# Patient Record
Sex: FEMALE | Race: WHITE | Employment: FULL TIME | URBAN - METROPOLITAN AREA
[De-identification: names, ages, dates, MRNs, and addresses within clinical notes are randomized per-mention and may not be internally consistent; named-entity substitution may affect disease eponyms.]

---

## 2019-01-01 ENCOUNTER — TRANSFERRED RECORDS (OUTPATIENT)
Dept: HEALTH INFORMATION MANAGEMENT | Facility: CLINIC | Age: 24
End: 2019-01-01

## 2019-01-01 LAB — PAP SMEAR - HIM PATIENT REPORTED: NEGATIVE

## 2019-04-05 ENCOUNTER — TRANSFERRED RECORDS (OUTPATIENT)
Dept: HEALTH INFORMATION MANAGEMENT | Facility: CLINIC | Age: 24
End: 2019-04-05

## 2019-04-05 LAB
ALBUMIN (URINE) MG/SPEC: 5.3 MG/L
ALBUMIN/CREATININE RATIO: 2.8 MG/G CREAT
CREATININE (URINE): 1.86 G/L
TSH SERPL-ACNC: 0.84 UIU/ML (ref 0.35–4.94)

## 2019-11-11 ENCOUNTER — TRANSFERRED RECORDS (OUTPATIENT)
Dept: HEALTH INFORMATION MANAGEMENT | Facility: CLINIC | Age: 24
End: 2019-11-11

## 2019-11-11 LAB
RETINOPATHY: NEGATIVE
RETINOPATHY: NORMAL

## 2020-01-23 NOTE — PROGRESS NOTES
Subjective     Isaiah Barnes is a 24 year old female who presents to clinic today for the following health issues:    HPI   Patient is establishing care at this clinic.  Previous health care was at [facility].      Diabetes       Diagnosed with diabetes age 15 years old     Current medications include:  Just insulin  Refills needed of:  No, don't think so   How often are you checking your blood sugar? Four or more times daily  Blood sugar testing frequency justification:  Uncontrolled diabetes  What time of day are you checking your blood sugars (select all that apply)?  Before meals  Have you had any blood sugars above 200?  Yes a few   Have you had any blood sugars below 70?  No    What symptoms do you notice when your blood sugar is low?  Shaky    What concerns do you have today about your diabetes? None     Do you have any of these symptoms? (Select all that apply)  No numbness or tingling in feet.  No redness, sores or blisters on feet.  No complaints of excessive thirst.  No reports of blurry vision.  No significant changes to weight.    Please abstract the following data from this visit with this patient into the appropriate field in Epic:    Tests that can be patient reported without a hard copy:    Pap smear done on this date: 1 year ago (approximately), by this group: in [place], at [facility], results were normal.   LDL 61 mg/dL   [place] [facility] [place] [facility]  TSH 0.84    on 4/5/[place] [facility] [place] [facility]  Microalbumin 2.8   on 4/5/[place] [facility] [place] [facility]    BP Readings from Last 2 Encounters:   01/24/20 128/84   PHQ -2 = 0       How many servings of fruits and vegetables do you eat daily?  2-3    On average, how many sweetened beverages do you drink each day (Examples: soda, juice, sweet tea, etc.  Do NOT count diet or artificially sweetened beverages)?   0    How many days per week do you exercise enough to make your heart  beat faster? 4    How many minutes a day do you exercise enough to make your heart beat faster? 20 - 29    How many days per week do you miss taking your medication? 0    Patient would like to establish care here at our clinic. She was diagnosed with type-1 diabetes at 15-years-old for unknown cause.  There was no family history. She is an otherwise healthy 25-year-old. Her last physical was in April 2019 at [facility]. Her last PAP was 1.5 years ago in [place]. She has no concerns today.    She believes her diabetes is under good control. However, she does not remember the last time she had her A1C checked. She would like a referral for an endocrinologist. She is using novolog throughout the day on sliding scale of 1 unit per 7 carbs and 1 unit for every 40 above 150 blood glucose. Lantus 21units at bedtime has been stable dose for years.  She has noticed that the refillable Novolog pen she was last prescribed seems to require higher dosing.  She would like to switch back to the disposable pen.    She is taking OCP. She is not currently sexually active.  Most recently sexually active with boyfriend - cannot recall last STI screening, but believes it was in [place]. She reports regular periods; LMP 2 weeks ago, typically fairly light and lasts 7 days.     She is a fairly health eater and enjoys running and HIT workouts. She denies smoking and elicit drug use. She drinks 1-2 alcoholic beverages per week. 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  Studied communications at college in [place]. She declines the flu shot today.  Youngest of three children.  Parents live in [place].  Did Spanish Dance as a child.    Santa Schuster, student NP  Reviewed and updated as needed this visit by Provider         Review of Systems   ROS COMP: Constitutional, HEENT, cardiovascular, pulmonary, gi and gu systems are negative, except as otherwise noted.      Objective    /84   Pulse 90   Temp 98.5  F (36.9  C) (Oral)   Ht 1.635 m (5'  "4.37\")   Wt 73.2 kg (161 lb 6.4 oz)   LMP 01/06/2020 (Approximate)   SpO2 97%   BMI 27.39 kg/m    Body mass index is 27.39 kg/m .  Physical Exam   GENERAL: healthy, alert and no distress  EYES: Eyes grossly normal to inspection, PERRL and conjunctivae and sclerae normal  HENT: ear canals and TM's normal, nose and mouth without ulcers or lesions  NECK: no adenopathy, no asymmetry, masses, or scars and thyroid normal to palpation  RESP: lungs clear to auscultation - no rales, rhonchi or wheezes  CV: regular rate and rhythm, normal S1 S2, no S3 or S4, no murmur, click or rub, no peripheral edema and peripheral pulses strong  ABDOMEN: soft, nontender, no hepatosplenomegaly, no masses and bowel sounds normal  MS: no gross musculoskeletal defects noted, no edema  SKIN: no suspicious lesions or rashes  NEURO: Normal strength and tone, mentation intact and speech normal  PSYCH: mentation appears normal, affect normal/bright  Diabetic foot exam: normal DP and PT pulses, no trophic changes or ulcerative lesions, normal sensory exam and normal monofilament exam    Diagnostic Test Results:  Labs reviewed in Epic  pending        Assessment & Plan     (E10.9) Type 1 diabetes mellitus without complication (H)  (primary encounter diagnosis)  Comment:   Plan: ENDOCRINOLOGY ADULT REFERRAL, Hemoglobin A1c,         insulin glargine (LANTUS PEN) 100 UNIT/ML pen,         Comprehensive metabolic panel, insulin aspart         (NOVOLOG PEN) 100 UNIT/ML pen    Diagnosed age 15 years, stable LA dose, novolog refilled with request for disposable pen.  CMP, A1C due - remainder not due until after April 2020        (Z11.8) Special screening examination for chlamydial disease  Comment:   Plan: Chlamydia trachomatis PCR      Solely done to complete [facility] Measurement requirements - no patient concerns or symptoms      (Z30.41) Encounter for surveillance of contraceptive pills  Comment:   Plan: levonorgestrel-ethinyl estradiol      "    (PAULE/TERESSA/LESSINA) 0.1-20 MG-MCG tablet                 Patient Instructions     Please send a [employer] message with the name of your birth control and I can refill whenever it is needed    [employer] on [address]    DTP/aP  08/01/2001  1 of 5      Yes    HPV  07/30/2008  1 of 3  Gardasil 4  Full    No    HPV  10/30/2008  2 of 3  Gardasil 4  Full    No    Influenza  10/30/2008  Booster   Full    No    Influenza  10/31/2011  Booster   Full    No    Influenza  12/17/2012  Booster   Full    No    MenACWY  07/30/2008  1 of 2  Menactra  Full    No    MMR  08/01/2001  1 of 2  MMR II     Yes    Pneumo-poly  02/17/2017  1 of 2  Pneumovax 23  Full    No    Polio  08/01/2001  1 of 4  IPOL     Yes    Td/Tdap  07/24/2007  1 of 3  Tdap  Full    No    Td/Tdap  02/17/2017  2 of 3  Adacel 11+ yrs  Full    No              Return in about 6 months (around 7/24/2020) for Physical Exam.    VELASQUEZ Adorno [facility]

## 2020-01-24 ENCOUNTER — TELEPHONE (OUTPATIENT)
Dept: FAMILY MEDICINE | Facility: CLINIC | Age: 25
End: 2020-01-24

## 2020-01-24 ENCOUNTER — OFFICE VISIT (OUTPATIENT)
Dept: FAMILY MEDICINE | Facility: CLINIC | Age: 25
End: 2020-01-24
Payer: COMMERCIAL

## 2020-01-24 VITALS
WEIGHT: 161.4 LBS | DIASTOLIC BLOOD PRESSURE: 84 MMHG | OXYGEN SATURATION: 97 % | HEART RATE: 90 BPM | BODY MASS INDEX: 27.55 KG/M2 | TEMPERATURE: 98.5 F | SYSTOLIC BLOOD PRESSURE: 128 MMHG | HEIGHT: 64 IN

## 2020-01-24 DIAGNOSIS — Z30.41 ENCOUNTER FOR SURVEILLANCE OF CONTRACEPTIVE PILLS: ICD-10-CM

## 2020-01-24 DIAGNOSIS — Z11.8 SPECIAL SCREENING EXAMINATION FOR CHLAMYDIAL DISEASE: ICD-10-CM

## 2020-01-24 DIAGNOSIS — Z80.0 FAMILY HISTORY OF COLON CANCER: ICD-10-CM

## 2020-01-24 DIAGNOSIS — E10.9 TYPE 1 DIABETES MELLITUS WITHOUT COMPLICATION (H): Primary | ICD-10-CM

## 2020-01-24 LAB
ALBUMIN SERPL-MCNC: 4.1 G/DL (ref 3.4–5)
ALP SERPL-CCNC: 98 U/L (ref 40–150)
ALT SERPL W P-5'-P-CCNC: 20 U/L (ref 0–50)
ANION GAP SERPL CALCULATED.3IONS-SCNC: 6 MMOL/L (ref 3–14)
AST SERPL W P-5'-P-CCNC: 17 U/L (ref 0–45)
BILIRUB SERPL-MCNC: 0.3 MG/DL (ref 0.2–1.3)
BUN SERPL-MCNC: 11 MG/DL (ref 7–30)
CALCIUM SERPL-MCNC: 9.2 MG/DL (ref 8.5–10.1)
CHLORIDE SERPL-SCNC: 105 MMOL/L (ref 94–109)
CO2 SERPL-SCNC: 26 MMOL/L (ref 20–32)
CREAT SERPL-MCNC: 0.77 MG/DL (ref 0.52–1.04)
GFR SERPL CREATININE-BSD FRML MDRD: >90 ML/MIN/{1.73_M2}
GLUCOSE SERPL-MCNC: 155 MG/DL (ref 70–99)
HBA1C MFR BLD: 9.1 % (ref 0–5.6)
POTASSIUM SERPL-SCNC: 4.1 MMOL/L (ref 3.4–5.3)
PROT SERPL-MCNC: 7.5 G/DL (ref 6.8–8.8)
SODIUM SERPL-SCNC: 137 MMOL/L (ref 133–144)

## 2020-01-24 PROCEDURE — 99203 OFFICE O/P NEW LOW 30 MIN: CPT | Performed by: NURSE PRACTITIONER

## 2020-01-24 PROCEDURE — 80053 COMPREHEN METABOLIC PANEL: CPT | Performed by: NURSE PRACTITIONER

## 2020-01-24 PROCEDURE — 83036 HEMOGLOBIN GLYCOSYLATED A1C: CPT | Performed by: NURSE PRACTITIONER

## 2020-01-24 PROCEDURE — 36415 COLL VENOUS BLD VENIPUNCTURE: CPT | Performed by: NURSE PRACTITIONER

## 2020-01-24 PROCEDURE — 87491 CHLMYD TRACH DNA AMP PROBE: CPT | Performed by: NURSE PRACTITIONER

## 2020-01-24 RX ORDER — LEVONORGESTREL/ETHIN.ESTRADIOL 0.1-0.02MG
1 TABLET ORAL DAILY
Qty: 84 TABLET | Refills: 4 | Status: SHIPPED | OUTPATIENT
Start: 2020-01-24 | End: 2020-12-10

## 2020-01-24 ASSESSMENT — MIFFLIN-ST. JEOR: SCORE: 1477.98

## 2020-01-24 NOTE — TELEPHONE ENCOUNTER
Please let patient know that A1C was 9.1 which I believe is higher than her typical.  Can you ask what her typical fasting number has been?  I think we should increase her lantus.  No need to increase the novolog scale if she is able to get the disposable pen, but I don't think that change will get vaishnavi A1C to goal alone.  If she has endo consult within the next two weeks she can wait for that, but we should otherwise make changes. Rest of the labs were normal.  JESSY Cates

## 2020-01-24 NOTE — PATIENT INSTRUCTIONS
Please send a [employer] message with the name of your birth control and I can refill whenever it is needed    T[employer] on [address]    DTP/aP  08/01/2001  1 of 5      Yes    HPV  07/30/2008  1 of 3  Gardasil 4  Full    No    HPV  10/30/2008  2 of 3  Gardasil 4  Full    No    Influenza  10/30/2008  Booster   Full    No    Influenza  10/31/2011  Booster   Full    No    Influenza  12/17/2012  Booster   Full    No    MenACWY  07/30/2008  1 of 2  Menactra  Full    No    MMR  08/01/2001  1 of 2  MMR II     Yes    Pneumo-poly  02/17/2017  1 of 2  Pneumovax 23  Full    No    Polio  08/01/2001  1 of 4  IPOL     Yes    Td/Tdap  07/24/2007  1 of 3  Tdap  Full    No    Td/Tdap  02/17/2017  2 of 3  Adacel 11+ yrs  Full    No

## 2020-01-24 NOTE — TELEPHONE ENCOUNTER
Can you ask patient what age her father was diagnosed with colon cancer and if his doctors made recommendations for his children regarding screening?  Otherwise typically it starts for children 10 years prior too the age of parent's diagnosis.  If she knows the age, can you please send note back to me so I can document?  Thanks!  We didn't get MyChart set up at her appt, but I encourage her to set up MyChart for her convenience.  JESSY Cates

## 2020-01-24 NOTE — TELEPHONE ENCOUNTER
Perlita,    Spoke with patient and she said her father was diagnosed at 47, and recommendations from his provider for his children to all be screened at age 30.     Thanks  Cher Mckeon RN   [facility]

## 2020-01-24 NOTE — Clinical Note
LDL 61 mg/dL   on [facility] EverywhereTSH 0.84    on 4/5/19 [facility] - in Care EverywhereMicroalbumin 2.8   on 4/5/19 [facility] - in Care Everywhere

## 2020-01-24 NOTE — TELEPHONE ENCOUNTER
Called patient. Left [facility] asking for return call to clinic to go over additional questions provider has regarding her family medical history. Chelsi Cruz RN on 1/24/2020 at 9:34 AM

## 2020-01-25 NOTE — RESULT ENCOUNTER NOTE
Isaiah,    Thank you for getting back to the nurses about your father's diagnosis.  I've noted that in your chart.  Your A1C is 9.1.  I am guessing this is higher than you typically see, but I don't have any past records to compare yet.  If you aren't going to see endo in the next two weeks we should think about increasing Lantus dose.  What is your typical AM fasting glucose?  I know we are hoping you will get the disposable novolog pen, but I am skeptical that alone would be enough to get A1C below 8. Let me know your Am glucoses and your thoughts on making changes.  If you have any questions, please feel free to contact the clinic.    JESSY Cates

## 2020-01-26 LAB
C TRACH DNA SPEC QL NAA+PROBE: NEGATIVE
SPECIMEN SOURCE: NORMAL

## 2020-01-29 NOTE — RESULT ENCOUNTER NOTE
Isaiah,    Chlamydia was negative.  I am hoping to hear back from you about which pen was covered so that we can make a plan for sliding scale or LA insulin adjustments.  If you have any questions, please feel free to contact the clinic.    JESSY Cates

## 2020-01-30 NOTE — TELEPHONE ENCOUNTER
RECORDS RECEIVED FROM: internal/CE   DATE RECEIVED: 4/6/20   NOTES (FOR ALL VISITS) STATUS DETAILS   OFFICE NOTES from referring provider Internal 1.24.20 Soo Chris   OFFICE NOTES from other specialist N/A    ED NOTES N/A    OPERATIVE REPORT  (thyroid, pituitary, adrenal, parathyroid) N/A    MEDICATION LIST Internal    IMAGING      DEXASCAN N/A    MRI (BRAIN) N/A    XR (Chest) N/A    CT (HEAD/NECK/CHEST/ABDOMEN) N/A    NUCLEAR  N/A    ULTRASOUND (HEAD/NECK) N/A    LABS     DIABETES: HBGA1C, CREATININE, FASTING LIPIDS, MICROALBUMIN URINE, POTASSIUM, TSH, T4    THYROID: TSH, T4, CBC, THYRODLONULIN, TOTAL T3, FREE T4, CALCITONIN, CEA Internal/CE   HBGA1C 1/24/20  CREATININE 4/5/19  MICROALBUMIN URINE 4/5/19  TSH- 4/5/19  T4- 7/3/12

## 2020-01-30 NOTE — TELEPHONE ENCOUNTER
Left vm for patient to return call to clinic.    Cher Mckeon RN   [facility]

## 2020-01-31 NOTE — TELEPHONE ENCOUNTER
Send [employer] message to patient with providers message and follow-up question. Chelsi Cruz RN on 1/31/2020 at 1:06 PM

## 2020-02-04 NOTE — TELEPHONE ENCOUNTER
Perlita,    Spoke with Isaiah and she said she has changed her diet and has increased her exercise. This has helped her BS, which has been around 115 average. She has a consult with luzmaria in April. She hopes to continue with change in diet and exercise instead of increasing lantus.    Thanks  Cher Mckeon RN   [facility]

## 2020-02-06 NOTE — TELEPHONE ENCOUNTER
That sounds great.  I am thrilled she has been able to get glucoses down with diet and exercise.  JESSY Cates

## 2020-03-19 ENCOUNTER — MYC MEDICAL ADVICE (OUTPATIENT)
Dept: FAMILY MEDICINE | Facility: CLINIC | Age: 25
End: 2020-03-19

## 2020-03-19 DIAGNOSIS — E10.9 TYPE 1 DIABETES MELLITUS WITHOUT COMPLICATION (H): Primary | ICD-10-CM

## 2020-03-19 NOTE — TELEPHONE ENCOUNTER
Test strips filled for patient. My chart message sent to patient to let her know.    Cher Mckeon RN   [facility]

## 2020-04-03 NOTE — PROGRESS NOTES
"Week of__/__/__ Date  3__/_23_  Date  3__/_24_ Date  3__/_25_ Date  _3_/26__ Date  3__/_27_ Date  _3_/28__ Date  3__/_29_    Time BG Time BG Time BG Time BG Time BG Time BG Time BG    6:55AM 87 7:50AM 115 9:00AM 120 7:00AM 64 7:30AM 79 6:00AM 126 11:00AM 133    12:30PM 160 2:00PM 132 5:00PM 77 9:45AM 205 9:00AM 256   7:50PM 152    4:00PM 140 10:00PM 68 9:00PM 126 8:23PM 143 11:20AM 106        11:00PM 130       9:00PM 117         Week of__/__/__ Date  3__/_30_  Date  3__/_31_ Date  _4_/1__ Date  4__/2__ Date  4__/_3_ Date  __/__ Date  __/__    Time BG Time BG Time BG Time BG Time BG Time BG Time BG    7:50AM 195 7:39AM 108 7:30AM 88 7:30AM 140 9:30AM 176        12:20PM 158 5:30PM 125 3:50PM 170            8:30PM 73 10:28PM 203 10:00PM 214            10:00PM 125                 Isaiah Barnes is a 24 year old female who is being evaluated via a billable telephone visit.      The patient has been notified of following:     \"This telephone visit will be conducted via a call between you and your physician/provider. We have found that certain health care needs can be provided without the need for a physical exam.  This service lets us provide the care you need with a short phone conversation.  If a prescription is necessary we can send it directly to your pharmacy.  If lab work is needed we can place an order for that and you can then stop by our lab to have the test done at a later time.    If during the course of the call the physician/provider feels a telephone visit is not appropriate, you will not be charged for this service.\"     Patient has given verbal consent for Telephone visit?  Yes    Isaiah Barnes complains of    Chief Complaint   Patient presents with     New Patient     TYPE 1     This 24-year-old woman was evaluated via a phone visit.  She would like to establish care for her type 1 diabetes in our clinic.  She reports she was diagnosed in 2010.  She has managed her diabetes with injections this " entire time.  She currently takes Lantus 21 units each night at 10 PM.  She also takes NovoLog 1 unit for 7 g and 1 unit to drop her 40 points with a target of 110 at mealtimes.  She checks her blood sugars about 4 times a day.  The blood sugars are listed above.  She recognizes her hypoglycemia when her values are in the 60s.  She is never had a severe episode of hypoglycemia.  Her last A1c was measured in January.  She was very surprised it was 9.1%.  She has had A1c's into the high sixes in recent years while in college.  She thinks the rise was because of the change in her lifestyle.  Since seeing that 9.1%, she started checking more often and being more vigilant in managing her sugars.    She eats breakfast, lunch, and dinner each day.  She is currently living with her parents so she is cooking some of her meals and her family is coping with others. She is confident with carb counting.  She likes to exercise by kickboxing but has not been able to do so because of the covid epidemic.  She was usually managed this activity by eating a low-carb protein bar before the activity.  This kept her blood sugars in target throughout the activity.  She saw her eye doctor last fall and was told she has no retinopathy.  She has no paresthesias of her feet.  She is never been told she had any kidney disease.    She did wear a continuous glucose monitor several years ago when it first came out.  She thought it was impractical to wear a device that required so much blood sugar checking.  She be interested in learning about new devices.  Her past medical history is otherwise unremarkable.    Her only medications are the insulin noted above.  She is also on birth control pills.     ROS: 10 point ROS neg other than the symptoms noted above in the HPI.    Social history is significant for working at [employer] as an .  She does not smoke cigarettes.    Family history is unremarkable.  No one else has  diabetes.        Rolando has no known allergies.    Recent Labs   Lab Test 01/24/20  0852 04/05/19 07/03/12  1545   A1C 9.1*  --   --    TSH  --  0.84 0.54   T4  --   --  1.09  6.7   CR 0.77  --  0.80     Urine alb 2.8 mg/gm cr in 2019    Assessment and plan:    1.  Diabetes control.  Her most recent A1c was above target, but her current blood sugars look much better controlled.  We will plan to measure an A1c test when I can see her next.  Because she is taking Lantus, I suggested she consider taking Tresiba or Toujeo.  I explained how they did not need to be taken at the same time each day and may provide more consistent basal coverage.  She is interested in doing this.  She thinks she has about 2 months less of Lantus and will make a switch then.  I also suggested you think about wearing the continuous glucose monitor because the data suggest that improves glucose control and particularly palpitations avoid hypoglycemia.  She I will send her the names of some of the sensor companies as well we will schedule her to see the nurse educator is interested in learning more about this, particularly because she no longer has to check her blood sugars if she wears the sensor.  About plan.  I also mentioned she might want to use a pump and sensor combination.  I will give her the names of some of these devices.  She will let me know if she wants to do that.  For now we will make no changes in NovoLog doses.  When she switches to Tresiba asked her to take 19 units each day.  I warned her that she might have high sugars the first day after making the switch and that she should not change the dose more frequently than every 3 or 4 days because of its long half-life.    2.  Diabetes complications.  By history she does not have any complications.  I will plan to check her urine albumin when she comes in.  We will check to see  Lisinopril.    3.CVD risk.  Her recent blood pressure was in target.  We will plan to check her  lipids at some point in the future.    Follow-up 3 months with 1 of the physicians assistants in 6 months with me.          Phone call duration: 32 minutes    Lola Butelr MD

## 2020-04-06 ENCOUNTER — PRE VISIT (OUTPATIENT)
Dept: ENDOCRINOLOGY | Facility: CLINIC | Age: 25
End: 2020-04-06

## 2020-04-06 ENCOUNTER — VIRTUAL VISIT (OUTPATIENT)
Dept: ENDOCRINOLOGY | Facility: CLINIC | Age: 25
End: 2020-04-06
Attending: NURSE PRACTITIONER
Payer: COMMERCIAL

## 2020-04-06 DIAGNOSIS — E10.9 TYPE 1 DIABETES MELLITUS WITHOUT COMPLICATION (H): Primary | ICD-10-CM

## 2020-04-06 NOTE — LETTER
"4/6/2020       RE: Isaiah Barnes  [address] [place] [address]     Dear Colleague,    Thank you for referring your patient, Isaiah Barnes, to the [facility] ENDOCRINOLOGY at [facility]. Please see a copy of my visit note below.    Week of__/__/__ Date  3__/_23_  Date  3__/_24_ Date  3__/_25_ Date  _3_/26__ Date  3__/_27_ Date  _3_/28__ Date  3__/_29_    Time BG Time BG Time BG Time BG Time BG Time BG Time BG    6:55AM 87 7:50AM 115 9:00AM 120 7:00AM 64 7:30AM 79 6:00AM 126 11:00AM 133    12:30PM 160 2:00PM 132 5:00PM 77 9:45AM 205 9:00AM 256   7:50PM 152    4:00PM 140 10:00PM 68 9:00PM 126 8:23PM 143 11:20AM 106        11:00PM 130       9:00PM 117         Week of__/__/__ Date  3__/_30_  Date  3__/_31_ Date  _4_/1__ Date  4__/2__ Date  4__/_3_ Date  __/__ Date  __/__    Time BG Time BG Time BG Time BG Time BG Time BG Time BG    7:50AM 195 7:39AM 108 7:30AM 88 7:30AM 140 9:30AM 176        12:20PM 158 5:30PM 125 3:50PM 170            8:30PM 73 10:28PM 203 10:00PM 214            10:00PM 125                 Isaiah Barnes is a 24 year old female who is being evaluated via a billable telephone visit.      The patient has been notified of following:     \"This telephone visit will be conducted via a call between you and your physician/provider. We have found that certain health care needs can be provided without the need for a physical exam.  This service lets us provide the care you need with a short phone conversation.  If a prescription is necessary we can send it directly to your pharmacy.  If lab work is needed we can place an order for that and you can then stop by our lab to have the test done at a later time.    If during the course of the call the physician/provider feels a telephone visit is not appropriate, you will not be charged for this service.\"     Patient has given verbal consent for Telephone visit?  Yes    Isaiah Barnes complains of    Chief " Complaint   Patient presents with     New Patient     TYPE 1     This 24-year-old woman was evaluated via a phone visit.  She would like to establish care for her type 1 diabetes in our clinic.  She reports she was diagnosed in 2010.  She has managed her diabetes with injections this entire time.  She currently takes Lantus 21 units each night at 10 PM.  She also takes NovoLog 1 unit for 7 g and 1 unit to drop her 40 points with a target of 110 at mealtimes.  She checks her blood sugars about 4 times a day.  The blood sugars are listed above.  She recognizes her hypoglycemia when her values are in the 60s.  She is never had a severe episode of hypoglycemia.  Her last A1c was measured in January.  She was very surprised it was 9.1%.  She has had A1c's into the high sixes in recent years while in college.  She thinks the rise was because of the change in her lifestyle.  Since seeing that 9.1%, she started checking more often and being more vigilant in managing her sugars.    She eats breakfast, lunch, and dinner each day.  She is currently living with her parents so she is cooking some of her meals and her family is coping with others. She is confident with carb counting.  She likes to exercise by kickboxing but has not been able to do so because of the covid epidemic.  She was usually managed this activity by eating a low-carb protein bar before the activity.  This kept her blood sugars in target throughout the activity.  She saw her eye doctor last fall and was told she has no retinopathy.  She has no paresthesias of her feet.  She is never been told she had any kidney disease.    She did wear a continuous glucose monitor several years ago when it first came out.  She thought it was impractical to wear a device that required so much blood sugar checking.  She be interested in learning about new devices.  Her past medical history is otherwise unremarkable.    Her only medications are the insulin noted above.  She is  also on birth control pills.     ROS: 10 point ROS neg other than the symptoms noted above in the HPI.    Social history is significant for working at [employer] as an .  She does not smoke cigarettes.    Family history is unremarkable.  No one else has diabetes.        IPatient has no known allergies.    Recent Labs   Lab Test 01/24/20  0852 04/05/19 07/03/12  1545   A1C 9.1*  --   --    TSH  --  0.84 0.54   T4  --   --  1.09  6.7   CR 0.77  --  0.80     Urine alb 2.8 mg/gm cr in 2019    Assessment and plan:    1.  Diabetes control.  Her most recent A1c was above target, but her current blood sugars look much better controlled.  We will plan to measure an A1c test when I can see her next.  Because she is taking Lantus, I suggested she consider taking Tresiba or Toujeo.  I explained how they did not need to be taken at the same time each day and may provide more consistent basal coverage.  She is interested in doing this.  She thinks she has about 2 months less of Lantus and will make a switch then.  I also suggested you think about wearing the continuous glucose monitor because the data suggest that improves glucose control and particularly palpitations avoid hypoglycemia.  She I will send her the names of some of the sensor companies as well we will schedule her to see the nurse educator is interested in learning more about this, particularly because she no longer has to check her blood sugars if she wears the sensor.  About plan.  I also mentioned she might want to use a pump and sensor combination.  I will give her the names of some of these devices.  She will let me know if she wants to do that.  For now we will make no changes in NovoLog doses.  When she switches to Tresiba asked her to take 19 units each day.  I warned her that she might have high sugars the first day after making the switch and that she should not change the dose more frequently than every 3 or 4 days because of its long  half-life.    2.  Diabetes complications.  By history she does not have any complications.  I will plan to check her urine albumin when she comes in.  We will check to see  Lisinopril.    3.CVD risk.  Her recent blood pressure was in target.  We will plan to check her lipids at some point in the future.    Follow-up 3 months with 1 of the physicians assistants in 6 months with me.          Phone call duration: 32 minutes    Lola Butler MD

## 2020-04-06 NOTE — PATIENT INSTRUCTIONS
Nice to talk with you today!  Here is a summary of the plans we made.    1.  When you run out of lantus, switch to tresiba 19 units each day.  I only order one pen in case you don't like it. If you do, let me know and I will order a 3 month supply.     2.  Check out the dexcom sensor, tandem pump, medtronic pump and sensor, omnipod sensor on line.    3.  If you want to try one of these devices, please see the nurse educator to learn about them.  I will put an order in for you to see her.    4.  When convenient, have lab tests done.  I will put orders in.  You can have them at any [facility] lab.

## 2020-12-01 NOTE — TELEPHONE ENCOUNTER
RECORDS RECEIVED FROM: internal    DATE RECEIVED: 12.15.20    NOTES (FOR ALL VISITS) STATUS DETAILS   OFFICE NOTES from referring provider Internal  Soo Chris   OFFICE NOTES from other specialist Internal /CE Zhane 12.1.19  Jayro   Internal- 4.6.20 [facility]    ED NOTES na    OPERATIVE REPORT  (thyroid, pituitary, adrenal, parathyroid) na    MEDICATION LIST Internal     IMAGING      DEXASCAN na    MRI (BRAIN) na    XR (Chest) na    CT (HEAD/NECK/CHEST/ABDOMEN) na    NUCLEAR  na    ULTRASOUND (HEAD/NECK) na    LABS     DIABETES: HBGA1C, CREATININE, FASTING LIPIDS, MICROALBUMIN URINE, POTASSIUM, TSH, T4    THYROID: TSH, T4, CBC, THYRODLONULIN, TOTAL T3, FREE T4, CALCITONIN, CEA Internal  HBGA1C 1.24.20   CREATININE 4.5.19   Cbc- 7.3.12  T3- 2012  TSH- 4.5.19  T4- 2012

## 2020-12-10 ENCOUNTER — OFFICE VISIT (OUTPATIENT)
Dept: FAMILY MEDICINE | Facility: CLINIC | Age: 25
End: 2020-12-10
Payer: COMMERCIAL

## 2020-12-10 VITALS
SYSTOLIC BLOOD PRESSURE: 118 MMHG | HEART RATE: 90 BPM | TEMPERATURE: 98.9 F | WEIGHT: 163 LBS | DIASTOLIC BLOOD PRESSURE: 84 MMHG | RESPIRATION RATE: 16 BRPM | HEIGHT: 64 IN | OXYGEN SATURATION: 97 % | BODY MASS INDEX: 27.83 KG/M2

## 2020-12-10 DIAGNOSIS — Z30.41 ENCOUNTER FOR SURVEILLANCE OF CONTRACEPTIVE PILLS: ICD-10-CM

## 2020-12-10 DIAGNOSIS — E10.9 TYPE 1 DIABETES MELLITUS WITHOUT COMPLICATION (H): ICD-10-CM

## 2020-12-10 DIAGNOSIS — Z11.59 ENCOUNTER FOR HEPATITIS C SCREENING TEST FOR LOW RISK PATIENT: ICD-10-CM

## 2020-12-10 DIAGNOSIS — E10.65 TYPE 1 DIABETES MELLITUS WITH HYPERGLYCEMIA (H): Primary | ICD-10-CM

## 2020-12-10 DIAGNOSIS — Z11.8 SPECIAL SCREENING EXAMINATION FOR CHLAMYDIAL DISEASE: ICD-10-CM

## 2020-12-10 DIAGNOSIS — Z23 NEED FOR PROPHYLACTIC VACCINATION AND INOCULATION AGAINST INFLUENZA: ICD-10-CM

## 2020-12-10 LAB
ALBUMIN SERPL-MCNC: 3.9 G/DL (ref 3.4–5)
ALP SERPL-CCNC: 93 U/L (ref 40–150)
ALT SERPL W P-5'-P-CCNC: 16 U/L (ref 0–50)
ANION GAP SERPL CALCULATED.3IONS-SCNC: 6 MMOL/L (ref 3–14)
AST SERPL W P-5'-P-CCNC: 11 U/L (ref 0–45)
BILIRUB SERPL-MCNC: 0.3 MG/DL (ref 0.2–1.3)
BUN SERPL-MCNC: 10 MG/DL (ref 7–30)
CALCIUM SERPL-MCNC: 9.2 MG/DL (ref 8.5–10.1)
CHLORIDE SERPL-SCNC: 105 MMOL/L (ref 94–109)
CHOLEST SERPL-MCNC: 155 MG/DL
CO2 SERPL-SCNC: 25 MMOL/L (ref 20–32)
CREAT SERPL-MCNC: 0.78 MG/DL (ref 0.52–1.04)
CREAT UR-MCNC: 120 MG/DL
GFR SERPL CREATININE-BSD FRML MDRD: >90 ML/MIN/{1.73_M2}
GLUCOSE SERPL-MCNC: 210 MG/DL (ref 70–99)
HBA1C MFR BLD: 8 % (ref 0–5.6)
HDLC SERPL-MCNC: 58 MG/DL
LDLC SERPL CALC-MCNC: 79 MG/DL
MICROALBUMIN UR-MCNC: 6 MG/L
MICROALBUMIN/CREAT UR: 5.02 MG/G CR (ref 0–25)
NONHDLC SERPL-MCNC: 97 MG/DL
POTASSIUM SERPL-SCNC: 4 MMOL/L (ref 3.4–5.3)
PROT SERPL-MCNC: 7.4 G/DL (ref 6.8–8.8)
SODIUM SERPL-SCNC: 136 MMOL/L (ref 133–144)
TRIGL SERPL-MCNC: 90 MG/DL
TSH SERPL DL<=0.005 MIU/L-ACNC: 0.68 MU/L (ref 0.4–4)

## 2020-12-10 PROCEDURE — 84443 ASSAY THYROID STIM HORMONE: CPT | Performed by: NURSE PRACTITIONER

## 2020-12-10 PROCEDURE — 36415 COLL VENOUS BLD VENIPUNCTURE: CPT | Performed by: INTERNAL MEDICINE

## 2020-12-10 PROCEDURE — 80053 COMPREHEN METABOLIC PANEL: CPT | Performed by: NURSE PRACTITIONER

## 2020-12-10 PROCEDURE — 90686 IIV4 VACC NO PRSV 0.5 ML IM: CPT | Performed by: NURSE PRACTITIONER

## 2020-12-10 PROCEDURE — 82043 UR ALBUMIN QUANTITATIVE: CPT | Performed by: INTERNAL MEDICINE

## 2020-12-10 PROCEDURE — 90471 IMMUNIZATION ADMIN: CPT | Performed by: NURSE PRACTITIONER

## 2020-12-10 PROCEDURE — 83036 HEMOGLOBIN GLYCOSYLATED A1C: CPT | Performed by: INTERNAL MEDICINE

## 2020-12-10 PROCEDURE — 99214 OFFICE O/P EST MOD 30 MIN: CPT | Mod: 25 | Performed by: NURSE PRACTITIONER

## 2020-12-10 PROCEDURE — 80061 LIPID PANEL: CPT | Performed by: INTERNAL MEDICINE

## 2020-12-10 PROCEDURE — 87491 CHLMYD TRACH DNA AMP PROBE: CPT | Performed by: NURSE PRACTITIONER

## 2020-12-10 PROCEDURE — 86803 HEPATITIS C AB TEST: CPT | Performed by: NURSE PRACTITIONER

## 2020-12-10 RX ORDER — LEVONORGESTREL/ETHIN.ESTRADIOL 0.1-0.02MG
1 TABLET ORAL DAILY
Qty: 84 TABLET | Refills: 4 | Status: SHIPPED | OUTPATIENT
Start: 2020-12-10 | End: 2021-12-28

## 2020-12-10 ASSESSMENT — MIFFLIN-ST. JEOR: SCORE: 1480.23

## 2020-12-10 NOTE — PATIENT INSTRUCTIONS
"[facility] - [phone]  Tatyana Brown or Cielo Valladares at [place];   Chapin Duarte, Sj Benjamin or Mely Kidd at [place];   Kurt Pavon - [place]      Outside of [place] - recommended by patients who have had good experiences    [facility] for Health and Wellness  Noemy Sandhu (?taking new patients?)  [phone]    [facility] in [place] - birth trauma, body work  [phone]    Yumi Vázquez - [place] and [place] on [address]  [phone]    Valarie Navarro  [phone]  [address]   [place], [place], [address]    Delmer Rivera - \"sex-positive\"  [address]  [address]  [place], [place] [address]     Daniel Valdes - LGBT experience  [place] for [facility] on [facility] in [place]  [address]  [place] [address]  Telephone: [phone]    Angela Michel  [address]  [place] [address]  Phone: [phone] X [phone]  [email or web address] [place]    Delta Larkey - family systems   [email or web address]  [address]  [place] [address]  Tel: [phone]      "

## 2020-12-11 LAB
C TRACH DNA SPEC QL NAA+PROBE: NEGATIVE
HCV AB SERPL QL IA: NONREACTIVE
SPECIMEN SOURCE: NORMAL

## 2020-12-14 ENCOUNTER — HEALTH MAINTENANCE LETTER (OUTPATIENT)
Age: 25
End: 2020-12-14

## 2020-12-15 ENCOUNTER — PRE VISIT (OUTPATIENT)
Dept: ENDOCRINOLOGY | Facility: CLINIC | Age: 25
End: 2020-12-15

## 2020-12-18 NOTE — PATIENT INSTRUCTIONS
Increase your tresiba to 20 units each day.  If your fasting sugars all remain above 100 two weeks after making this change, OK to increase the dose to 21 units.    Check your blood sugar 2 hours after you give insulin for a meal and/or correction.  It should be less than 150.  If it isn't the carb ratio or correction factor may need to be changed.  Happy to help with that via NewAer if you wish.    The other long acting insulin we discussed as an alternative to tresiba is toujeo.    Check out the perla sensor on line and see if it looks small enough for you to tolerate.  Let me know if you want me to write a prescription.    We appreciate your assistance in coordinating your healthcare.     Please upload your insulin pump, blood sugar meter and/or continuous glucose monitor at home 1-2 days before your next diabetes-related appointment.   This will allow your provider to review your  data before your scheduled virtual visit.    To ask a question to your Endocrine care team, please send them a NewAer message, or reach them by phone at [phone]     To expedite your medication refill(s), please contact your pharmacy and have them   fax a refill request to: [phone].  *Please allow 3 business days for routine medication refills.  *Please allow 5 business days for controlled substance medication refills.    For after-hours urgent Endocrine issues, that do not require 911, please dial [phone], and ask to speak with the Endocrinologist On-Call

## 2020-12-18 NOTE — PROGRESS NOTES
"Outcome for 12/18/20 9:57 AM :Left Voicemail for patient to call back- soj   Outcome for 12/21/20 12:17 PM :Left Voicemail for patient to call back-soj  Outcome for 12/22/20 9:06 AM :Left Voicemail for patient to call back and sent Nifty After Fiftyhart message to return call to clinic-so   Isaiah Barnes is a 25 year old female who is being evaluated via a billable video visit.      The patient has been notified of following:     \"This video visit will be conducted via a call between you and your physician/provider. We have found that certain health care needs can be provided without the need for an in-person physical exam.  This service lets us provide the care you need with a video conversation.  If a prescription is necessary we can send it directly to your pharmacy.  If lab work is needed we can place an order for that and you can then stop by our lab to have the test done at a later time.    Video visits are billed at different rates depending on your insurance coverage.  Please reach out to your insurance provider with any questions.    If during the course of the call the physician/provider feels a video visit is not appropriate, you will not be charged for this service.\"    Patient has given verbal consent for Video visit? Yes  How would you like to obtain your AVS? MyChart  If you are dropped from the video visit, the video invite should be resent to: Send to e-mail at: [email or web address]  Will anyone else be joining your video visit? No        Video-Visit Details    Type of service:  Video Visit    Video Start Time: 1:50  Video End Time: 2:16 PM    Originating Location (pt. Location): Home    Distant Location (provider location):  [facility] ENDOCRINOLOG[facility] [place]     Platform used for Video Visit: Abbey Butler MD       This 25-year-old woman seen for f/u of her type 1 diabetes.  I first saw her about 6 mo ago.  .  She reports she was diagnosed in 2010.  She has managed her " diabetes with injections this entire time.  She started taking tresiba about a month ago after she ran out of her lantus.  Overall she thinks it is better.  She really likes the fact that she doesn't have to take it the same time of day each day.  Her sugars are listed below.      Date Time Blood sugar   14-Dec 11:30pm 173   14-Dec 5:21pm 119   14-Dec 12:30pm 131   14-Dec 9:15am 115   13-Dec 10:22pm 70   13-Dec 6:45pm 190   13-Dec 1:30pm 114   13-Dec 10:00am 121   12-Dec 10:30pm 136   12-Dec 7:00pm 101   12-Dec 1:00pm 157   11-Dec 10:15pm 166   11-Dec 6:30pm 149   11-Dec 9:45am 123   10-Dec 10:30pm 175   10-Dec 6:45pm 140   10-Dec 8:45am 129   9-Dec 9:55pm 134   9-Dec 4:50pm 150   9-Dec 1:00pm 161   9-Dec 8:30am 190   8-Dec 7:00pm 230   8-Dec 12:30pm 250   8-Dec 8:30am 267   7-Dec 11:00pm 302   7-Dec 7:15pm 175   7-Dec 10:00am 163   6-Dec 10:00pm 170   6-Dec 3:15pm 183   6-Dec 10:30am 137   6-Dec 8:15am 145   5-Dec 9:45pm 153   5-Dec 5:00pm 136   5-Dec 11:30am 124   4-Dec 11:00pm 155   4-Dec 7:15pm 126   4-Dec 1:30pm 108   4-Dec 9:00am 154   3-Dec 11:00pm 177   3-Dec 5:54pm 165   3-Dec 11:45pm 140   2-Dec 11:00pm 170   2-Dec 12:15pm 139   1-Dec 10:30pm 254   1-Dec 2:30pm 113   1-Dec 9:45am 142     She takes 19 units of tresiba daily and   NovoLog 1 unit for 7 g and 1 unit to drop her 40 points with a target of 110 at mealtimes.   She recognizes her hypoglycemia when her values are in the 60s. She was drinking more wine earlier in the year and did get some lows on those nights.  She isn't doing that anymore and no longer has lows. She does have glucagon at home.  She is never had a severe episode of hypoglycemia.      She eats breakfast, lunch, and dinner each day.  She is now living with a cousin in an apartment.  She has had some anxiety and her PCP recommended she get more exercise.  She is now running some and doing jump rope in the apartment.  Her anxiety is better.  She hasn't had lows with exercise. out the  activity.  She saw her eye doctor last fall and was told she has no retinopathy.  She has no paresthesias of her feet.  She is never been told she had any kidney disease.      Her only medications are the insulin noted above.  She is also on birth control pills.Denies foot concerns.  Will see eye MD in January.  No other issues to discuss today.    Current Outpatient Medications   Medication     blood glucose (NO BRAND SPECIFIED) lancets standard     blood glucose (NO BRAND SPECIFIED) test strip     insulin aspart (NOVOLOG PEN) 100 UNIT/ML pen     insulin degludec (TRESIBA FLEXTOUCH) 100 UNIT/ML pen     levonorgestrel-ethinyl estradiol (AVIANE) 0.1-20 MG-MCG tablet     No current facility-administered medications for this visit.      So Hx - see above    On exam she is in NAD. CN grossly intact. Mood is upbeat. She is not anxious      Recent Labs   Lab Test 12/10/20  1000 12/10/20  0953 12/10/20  0939 01/24/20  0852 04/05/19   A1C  --  8.0*  --  9.1*  --    TSH  --   --  0.68  --  0.84   LDL  --  79  --   --   --    HDL  --  58  --   --   --    TRIG  --  90  --   --   --    CR  --   --  0.78 0.77  --    MICROL 6  --   --   --   --        Assessment and plan:    1.  Diabetes control.  Her A1c has come down a point since last January.  She remains are above target however.  Her fasting blood sugars are generally less than 150 but are not less than 100.  I suggested she increase the Tresiba to 20 units.  We may decide she actually needs a higher dose as well.  I suggested she begin to check her blood sugars 2 hours after meals so she can see if her carb ratio and correction scale are actually working.  We discussed using a sensor to do this but she does not really want to try that.  I recommended she look into the perla because it may be easier to wear than the Dexcom or Medtronic device.      2.  Diabetes complications.  Her renal function is normal.  She has no symptoms of neuropathy.  She will see her eye doctor in  January.    3.CVD risk.  Her blood pressure was well controlled when she saw her primary care provider in December.  Her LDL is at target.    Follow-up with me in 4 to 6 months.  We can do a virtual visit.    Lola Butler MD

## 2020-12-18 NOTE — RESULT ENCOUNTER NOTE
Isaiah,    Your labs were all normal.  If you have any questions, please feel free to contact the clinic.    JESSY Cates

## 2020-12-18 NOTE — RESULT ENCOUNTER NOTE
Isaiah,    Your labs were all normal (except the glucose).  If you have any questions, please feel free to contact the clinic.    JESSY Cates

## 2020-12-22 ENCOUNTER — VIRTUAL VISIT (OUTPATIENT)
Dept: ENDOCRINOLOGY | Facility: CLINIC | Age: 25
End: 2020-12-22
Payer: COMMERCIAL

## 2020-12-22 DIAGNOSIS — E10.9 TYPE 1 DIABETES MELLITUS WITHOUT COMPLICATION (H): Primary | ICD-10-CM

## 2020-12-22 PROCEDURE — 99214 OFFICE O/P EST MOD 30 MIN: CPT | Mod: GT | Performed by: INTERNAL MEDICINE

## 2020-12-22 RX ORDER — INSULIN ASPART 100 [IU]/ML
INJECTION, SOLUTION INTRAVENOUS; SUBCUTANEOUS
Qty: 36 ML | Refills: 3 | Status: SHIPPED | OUTPATIENT
Start: 2020-12-22 | End: 2021-04-09

## 2020-12-22 NOTE — LETTER
"12/22/2020       RE: Isaiah Barnes  [address] [place] [address]     Dear Colleague,    Thank you for referring your patient, Isaiah Barnes, to the [facility] ENDOCRINOLOGY CLINIC [place] at [facility] Please see a copy of my visit note below.    Outcome for 12/18/20 9:57 AM :Left Voicemail for patient to call back- soj   Outcome for 12/21/20 12:17 PM :Left Voicemail for patient to call back-soj  Outcome for 12/22/20 9:06 AM :Left Voicemail for patient to call back and sent RipCode message to return call to clinic-soj   Isaiah Barnes is a 25 year old female who is being evaluated via a billable video visit.      The patient has been notified of following:     \"This video visit will be conducted via a call between you and your physician/provider. We have found that certain health care needs can be provided without the need for an in-person physical exam.  This service lets us provide the care you need with a video conversation.  If a prescription is necessary we can send it directly to your pharmacy.  If lab work is needed we can place an order for that and you can then stop by our lab to have the test done at a later time.    Video visits are billed at different rates depending on your insurance coverage.  Please reach out to your insurance provider with any questions.    If during the course of the call the physician/provider feels a video visit is not appropriate, you will not be charged for this service.\"    Patient has given verbal consent for Video visit? Yes  How would you like to obtain your AVS? MyChart  If you are dropped from the video visit, the video invite should be resent to: Send to e-mail at: [email or web address]  Will anyone else be joining your video visit? No        Video-Visit Details    Type of service:  Video Visit    Video Start Time: 1:50  Video End Time: 2:16 PM    Originating Location (pt. Location): Home    Distant " Location (provider location):  [facility]CRINOLOG[facility] [place]     Platform used for Video Visit: Abbey Butler MD       This 25-year-old woman seen for f/u of her type 1 diabetes.  I first saw her about 6 mo ago.  .  She reports she was diagnosed in 2010.  She has managed her diabetes with injections this entire time.  She started taking tresiba about a month ago after she ran out of her lantus.  Overall she thinks it is better.  She really likes the fact that she doesn't have to take it the same time of day each day.  Her sugars are listed below.      Date Time Blood sugar   14-Dec 11:30pm 173   14-Dec 5:21pm 119   14-Dec 12:30pm 131   14-Dec 9:15am 115   13-Dec 10:22pm 70   13-Dec 6:45pm 190   13-Dec 1:30pm 114   13-Dec 10:00am 121   12-Dec 10:30pm 136   12-Dec 7:00pm 101   12-Dec 1:00pm 157   11-Dec 10:15pm 166   11-Dec 6:30pm 149   11-Dec 9:45am 123   10-Dec 10:30pm 175   10-Dec 6:45pm 140   10-Dec 8:45am 129   9-Dec 9:55pm 134   9-Dec 4:50pm 150   9-Dec 1:00pm 161   9-Dec 8:30am 190   8-Dec 7:00pm 230   8-Dec 12:30pm 250   8-Dec 8:30am 267   7-Dec 11:00pm 302   7-Dec 7:15pm 175   7-Dec 10:00am 163   6-Dec 10:00pm 170   6-Dec 3:15pm 183   6-Dec 10:30am 137   6-Dec 8:15am 145   5-Dec 9:45pm 153   5-Dec 5:00pm 136   5-Dec 11:30am 124   4-Dec 11:00pm 155   4-Dec 7:15pm 126   4-Dec 1:30pm 108   4-Dec 9:00am 154   3-Dec 11:00pm 177   3-Dec 5:54pm 165   3-Dec 11:45pm 140   2-Dec 11:00pm 170   2-Dec 12:15pm 139   1-Dec 10:30pm 254   1-Dec 2:30pm 113   1-Dec 9:45am 142     She takes 19 units of tresiba daily and   NovoLog 1 unit for 7 g and 1 unit to drop her 40 points with a target of 110 at mealtimes.   She recognizes her hypoglycemia when her values are in the 60s. She was drinking more wine earlier in the year and did get some lows on those nights.  She isn't doing that anymore and no longer has lows. She does have glucagon at home.  She is never had a severe episode of  hypoglycemia.      She eats breakfast, lunch, and dinner each day.  She is now living with a cousin in an apartment.  She has had some anxiety and her PCP recommended she get more exercise.  She is now running some and doing jump rope in the apartment.  Her anxiety is better.  She hasn't had lows with exercise. out the activity.  She saw her eye doctor last fall and was told she has no retinopathy.  She has no paresthesias of her feet.  She is never been told she had any kidney disease.      Her only medications are the insulin noted above.  She is also on birth control pills.Denies foot concerns.  Will see eye MD in January.  No other issues to discuss today.    Current Outpatient Medications   Medication     blood glucose (NO BRAND SPECIFIED) lancets standard     blood glucose (NO BRAND SPECIFIED) test strip     insulin aspart (NOVOLOG PEN) 100 UNIT/ML pen     insulin degludec (TRESIBA FLEXTOUCH) 100 UNIT/ML pen     levonorgestrel-ethinyl estradiol (AVIANE) 0.1-20 MG-MCG tablet     No current facility-administered medications for this visit.      So Hx - see above    On exam she is in NAD. CN grossly intact. Mood is upbeat. She is not anxious      Recent Labs   Lab Test 12/10/20  1000 12/10/20  0953 12/10/20  0939 01/24/20  0852 04/05/19   A1C  --  8.0*  --  9.1*  --    TSH  --   --  0.68  --  0.84   LDL  --  79  --   --   --    HDL  --  58  --   --   --    TRIG  --  90  --   --   --    CR  --   --  0.78 0.77  --    MICROL 6  --   --   --   --        Assessment and plan:    1.  Diabetes control.  Her A1c has come down a point since last January.  She remains are above target however.  Her fasting blood sugars are generally less than 150 but are not less than 100.  I suggested she increase the Tresiba to 20 units.  We may decide she actually needs a higher dose as well.  I suggested she begin to check her blood sugars 2 hours after meals so she can see if her carb ratio and correction scale are actually working.   We discussed using a sensor to do this but she does not really want to try that.  I recommended she look into the perla because it may be easier to wear than the Dexcom or Medtronic device.      2.  Diabetes complications.  Her renal function is normal.  She has no symptoms of neuropathy.  She will see her eye doctor in January.    3.CVD risk.  Her blood pressure was well controlled when she saw her primary care provider in December.  Her LDL is at target.    Follow-up with me in 4 to 6 months.  We can do a virtual visit.    Lola Butler MD

## 2021-01-26 DIAGNOSIS — E10.9 TYPE 1 DIABETES MELLITUS WITHOUT COMPLICATION (H): Primary | ICD-10-CM

## 2021-04-09 ENCOUNTER — MYC MEDICAL ADVICE (OUTPATIENT)
Dept: ENDOCRINOLOGY | Facility: CLINIC | Age: 26
End: 2021-04-09

## 2021-04-09 DIAGNOSIS — E10.9 TYPE 1 DIABETES MELLITUS WITHOUT COMPLICATION (H): ICD-10-CM

## 2021-04-09 RX ORDER — INSULIN ASPART 100 [IU]/ML
INJECTION, SOLUTION INTRAVENOUS; SUBCUTANEOUS
Qty: 36 ML | Refills: 3 | Status: SHIPPED | OUTPATIENT
Start: 2021-04-09 | End: 2022-03-22

## 2021-04-18 ENCOUNTER — HEALTH MAINTENANCE LETTER (OUTPATIENT)
Age: 26
End: 2021-04-18

## 2021-08-07 ENCOUNTER — HEALTH MAINTENANCE LETTER (OUTPATIENT)
Age: 26
End: 2021-08-07

## 2021-10-02 ENCOUNTER — HEALTH MAINTENANCE LETTER (OUTPATIENT)
Age: 26
End: 2021-10-02

## 2021-11-27 ENCOUNTER — HEALTH MAINTENANCE LETTER (OUTPATIENT)
Age: 26
End: 2021-11-27

## 2021-12-28 DIAGNOSIS — Z30.41 ENCOUNTER FOR SURVEILLANCE OF CONTRACEPTIVE PILLS: ICD-10-CM

## 2021-12-28 RX ORDER — LEVONORGESTREL/ETHIN.ESTRADIOL 0.1-0.02MG
1 TABLET ORAL DAILY
Qty: 84 TABLET | Refills: 0 | Status: SHIPPED | OUTPATIENT
Start: 2021-12-28 | End: 2022-03-22

## 2021-12-28 NOTE — TELEPHONE ENCOUNTER
Reason for Call:  Medication or medication refill:    Do you use a [facility]?  Name of the pharmacy and phone number for the current request:  [facility]/PHARMACY #[id number] - [place], [place] - [address] [facility]    Name of the medication requested:   LARISSIA - 28 TABLET 84.0ED     Other request: medication not listed on orders    Can we leave a detailed message on this number? Not Applicable    Phone number patient can be reached at: Home number on file [phone] (home)    Best Time: N/A    Call taken on 12/28/2021 at 3:56 PM by Halima Dejesus MA

## 2022-01-22 ENCOUNTER — HEALTH MAINTENANCE LETTER (OUTPATIENT)
Age: 27
End: 2022-01-22

## 2022-02-21 NOTE — PROGRESS NOTES
Isaiah is a 26 year old who is being evaluated via a billable video visit.      Patient reviewed allergies and medications on [employer] and states all are correct.     How would you like to obtain your AVS? [employer]  If the video visit is dropped, the invitation should be resent by  Will anyone else be joining your video visit? No      Outcome for 02/21/22 1:41 PM: RapidMind message sent   Angela Zhu, VF  Outcome for 02/21/22 2:47 PM: Per patient, will send BG readings via RapidMind  Outcome for 02/21/22 4:10 PM: Glucose Readings sent via RapidMind   Angela Zhu, VF

## 2022-02-22 ENCOUNTER — TELEPHONE (OUTPATIENT)
Dept: ENDOCRINOLOGY | Facility: CLINIC | Age: 27
End: 2022-02-22

## 2022-02-22 ENCOUNTER — VIRTUAL VISIT (OUTPATIENT)
Dept: ENDOCRINOLOGY | Facility: CLINIC | Age: 27
End: 2022-02-22
Payer: COMMERCIAL

## 2022-02-22 DIAGNOSIS — E10.9 TYPE 1 DIABETES MELLITUS WITHOUT COMPLICATION (H): Primary | ICD-10-CM

## 2022-02-22 PROCEDURE — 99214 OFFICE O/P EST MOD 30 MIN: CPT | Mod: 95 | Performed by: INTERNAL MEDICINE

## 2022-02-22 RX ORDER — GLUCAGON 3 MG/1
1 POWDER NASAL
Qty: 1 EACH | Refills: 1 | Status: SHIPPED | OUTPATIENT
Start: 2022-02-22

## 2022-02-22 RX ORDER — INSULIN LISPRO 100 [IU]/ML
INJECTION, SOLUTION SUBCUTANEOUS
Qty: 27 ML | Refills: 3 | OUTPATIENT
Start: 2022-02-22 | End: 2022-03-02

## 2022-02-22 NOTE — TELEPHONE ENCOUNTER
PA Initiation    Medication: Humalog Mervin Kwikpen - PA Pending  Insurance Company: Who@ - Phone [phone] Fax [phone]  Pharmacy Filling the Rx:    Filling Pharmacy Phone:    Filling Pharmacy Fax:    Start Date: 2/22/2022

## 2022-02-22 NOTE — PROGRESS NOTES
HPI:  Isaiah is a 26-year-old woman here for a virtual f/u for her type 1 diabetes. She was last seen by Dr. Butler in Dec 2020. Her last A1C was 8.0% also in Dec 2020. She will get her A1C redone next month when she sees her PCP Soo Chris. Overall she expresses that she's been doing well, is not concerned about her diabetes care, and wants to continue what she's doing. She agrees that she's been a little high recently and attributes it to the stresses of moving into a new home. She would like to switch to another form of insulin pens (Kwiq pens) instead of insulin cartridges if possible. She is bolusing 1 unit / 7 carbs and at 21 units of basaglar once daily in the evenings. She normally does not have a problem bolusing before meals unless it is at a restaurant where she is unsure she'll like the food and doesn't want to over bolus. She tries to walk everyday depending on weather and tries to go to the gym 2x/week depending on her car. She always tests her BG before exercise, has fruit snacks incase she's hypoglycemic, and is aware when she is low. ROS negative, see below for details.    Eye exam scheduled for March.    Her last 2 weeks of readings are below. 2 instances of hypoglycemia <70 that occurred in the afternoon. 6 instances of hyperglycemia >200 often in the evenings.    2/21   - 12:34pm 114  - 10:03am 137     2/20  - 10:27pm 142  - 2:59pm 63  - 1:55pm 108  - 10:23am 144     2/19  - 9:43pm 162  - 4:07pm 177  - 10:10am 225     2/18  - 10:51pm 247  - 6:17pm 131  - 1:22pm 96  - 9:45am 136     2/17  - 12:10am 198  - 2:57pm 119  - 10:31am 125     2/16  - 10:53pm 131  - 5:27pm 162  - 11:40am 126     2/15  - 11:41pm 187  - 6:38pm 119  - 11:05am 163     2/14  - 11:35pm 91  - 7:29pm 158  - 1:16pm 102  - 9:58am 91     2/13  - 11:19pm 138  - 6:44pm 137  - 10:30am 93     2/12  - 10:46pm 252  - 10:07am 112     2/11  - 11:59pm 107  - 6:40pm 125  - 1:03pm 117  - 10:47am 128     2/10  - 9:10pm 171  - 5:50pm  "228  - 3:18pm 251  - 10:49am 140     2/9  - 11:25pm 268  - 2:25pm 270  - 9:26am 139     2/8  - 10:14pm 161  - 2:27pm 172  - 9:18am 135     2/7  - 11:01pm 272  - 4:38pm 56  - 1:50pm 98  - 10:02am 102      Past Medical History  No past medical history on file.      Current Outpatient Medications   Medication     Glucagon (BAQSIMI ONE PACK) 3 MG/DOSE POWD     HUMALOG JENNIFER KWIKPEN 100 UNIT/ML (0.5 unit dial) KWIKPEN     blood glucose (NO BRAND SPECIFIED) lancets standard     blood glucose (NO BRAND SPECIFIED) test strip     insulin aspart (NOVOLOG PEN) 100 UNIT/ML pen     insulin degludec (TRESIBA FLEXTOUCH) 100 UNIT/ML pen     insulin glargine (LANTUS SOLOSTAR) 100 UNIT/ML pen     insulin pen needle (32G X 4 MM) 32G X 4 MM miscellaneous     levonorgestrel-ethinyl estradiol (AVIANE) 0.1-20 MG-MCG tablet     NOVOLOG PENFILL 100 UNIT/ML soln     No current facility-administered medications for this visit.       Vital signs:                         Estimated body mass index is 27.66 kg/m  as calculated from the following:    Height as of 12/10/20: 1.635 m (5' 4.37\").    Weight as of 12/10/20: 73.9 kg (163 lb).  Virtual visit, no BP      Review of Systems:  General: good mood, doing well  Neuro: no numbness or tingling  HEENT: no changes in vision, eye exam apt in March  CV: no chest pain  Resp: no SOB  GI: no pain, constipation, or diarrhea  : no dysuria  Feet: no changes or open wounds      Social History:  The patient has recently moved to [place] and has a new job working from home. She tries to use her standing desk. She doesn't smoke and drinks EtOH approx 1x/week.      Family History:  No family members with a history of diabetes.      Physical Exam:  Virtual visit. Cranial nerves intact.      Recent Labs   Lab Test 12/10/20  0953 12/10/20  0939 01/24/20  0852 04/05/19  0000   A1C 8.0*  --  9.1*  --    TSH  --  0.68  --  0.84   CR  --  0.78 0.77  --    HDL 58  --   --   --    LDL 79  --   --   --    TRIG 90  --   " --   --    ALT  --  16 20  --    AST  --  11 17  --        Assessment and Plan:  1. Diabetes:  Isaiah is doing OK. Her A1C in Dec 2020 was 8.0% and it could be higher now. She has been slightly hyperglycemic recently but attributes it to her moving into a new home. We discussed keeping an eye on when she's consistently hyper- or hypoglycemic and can adjust insulin accordingly, such as lowering basal if she's consistently low in the mornings or increasing meal-time insulin if she has high postprandial sugars. She wants to stick to what she's doing now but is open to the Heriberto CGM after some discussion. The Heriberto might be a better option than the Dexcom due to size. We'll be in contact after her next A1C in March and she's OK revisiting the idea of a pump or CGM if her A1C is above 8.5%. We also talked about a general goal of getting her A1C into the low 7's to prevent future diabetic complications.    2. Diabetes complications:  No complications at the moment. Eye exam scheduled soon. No symptoms of neuropathy. Placed orders for kidney function.    3. CVD risk:  Minimal at the moment. Lipids were ok in 2020 and likely OK now. She mentioned her last BP check was a couple months ago and was OK at that time.     4. New prescription for Alexis-pen that has 1/2 units to better control her meal-time bolus. New prescription for glucagon nasal spray.    5. F/U with Dr. Butler in 6 mo. Return sooner if A1C comes back higher.     This patient was seen with Lonnie Freeman (medical student) and Dr. Lola Butler.

## 2022-02-22 NOTE — LETTER
2/22/2022       RE: Isaiah Barnes  [address] [place] [address] [place] [address]     Dear Colleague,    Thank you for referring your patient, Isaiah Barnes, to the [facility]. Please see a copy of my visit note below.    Isaiah is a 26 year old who is being evaluated via a billable video visit.      Patient reviewed allergies and medications on [employer] and states all are correct.     How would you like to obtain your AVS? [employer]  If the video visit is dropped, the invitation should be resent by  Will anyone else be joining your video visit? No      Outcome for 02/21/22 1:41 PM: Vaurum message sent   Angela Zhu, MEGHANA  Outcome for 02/21/22 2:47 PM: Per patient, will send BG readings via Vaurum  Outcome for 02/21/22 4:10 PM: Glucose Readings sent via Vaurum   Angela Zhu, MEGHANA      HPI:  Isaiah is a 26-year-old woman here for a virtual f/u for her type 1 diabetes. She was last seen by Dr. Butler in Dec 2020. Her last A1C was 8.0% also in Dec 2020. She will get her A1C redone next month when she sees her PCP Soo Chris. Overall she expresses that she's been doing well, is not concerned about her diabetes care, and wants to continue what she's doing. She agrees that she's been a little high recently and attributes it to the stresses of moving into a new home. She would like to switch to another form of insulin pens (Kwiq pens) instead of insulin cartridges if possible. She is bolusing 1 unit / 7 carbs and at 21 units of basaglar once daily in the evenings. She normally does not have a problem bolusing before meals unless it is at a restaurant where she is unsure she'll like the food and doesn't want to over bolus. She tries to walk everyday depending on weather and tries to go to the gym 2x/week depending on her car. She always tests her BG before exercise, has fruit snacks incase she's hypoglycemic,  and is aware when she is low. ROS negative, see below for details.    Eye exam scheduled for March.    Her last 2 weeks of readings are below. 2 instances of hypoglycemia <70 that occurred in the afternoon. 6 instances of hyperglycemia >200 often in the evenings.    2/21   - 12:34pm 114  - 10:03am 137     2/20  - 10:27pm 142  - 2:59pm 63  - 1:55pm 108  - 10:23am 144     2/19  - 9:43pm 162  - 4:07pm 177  - 10:10am 225     2/18  - 10:51pm 247  - 6:17pm 131  - 1:22pm 96  - 9:45am 136     2/17  - 12:10am 198  - 2:57pm 119  - 10:31am 125     2/16  - 10:53pm 131  - 5:27pm 162  - 11:40am 126     2/15  - 11:41pm 187  - 6:38pm 119  - 11:05am 163     2/14  - 11:35pm 91  - 7:29pm 158  - 1:16pm 102  - 9:58am 91     2/13  - 11:19pm 138  - 6:44pm 137  - 10:30am 93     2/12  - 10:46pm 252  - 10:07am 112     2/11  - 11:59pm 107  - 6:40pm 125  - 1:03pm 117  - 10:47am 128     2/10  - 9:10pm 171  - 5:50pm 228  - 3:18pm 251  - 10:49am 140     2/9  - 11:25pm 268  - 2:25pm 270  - 9:26am 139     2/8  - 10:14pm 161  - 2:27pm 172  - 9:18am 135     2/7  - 11:01pm 272  - 4:38pm 56  - 1:50pm 98  - 10:02am 102      Past Medical History  No past medical history on file.      Current Outpatient Medications   Medication     Glucagon (BAQSIMI ONE PACK) 3 MG/DOSE POWD     HUMALOG JENNIFER KWIKPEN 100 UNIT/ML (0.5 unit dial) KWIKPEN     blood glucose (NO BRAND SPECIFIED) lancets standard     blood glucose (NO BRAND SPECIFIED) test strip     insulin aspart (NOVOLOG PEN) 100 UNIT/ML pen     insulin degludec (TRESIBA FLEXTOUCH) 100 UNIT/ML pen     insulin glargine (LANTUS SOLOSTAR) 100 UNIT/ML pen     insulin pen needle (32G X 4 MM) 32G X 4 MM miscellaneous     levonorgestrel-ethinyl estradiol (AVIANE) 0.1-20 MG-MCG tablet     NOVOLOG PENFILL 100 UNIT/ML soln     No current facility-administered medications for this visit.       Vital signs:                         Estimated body mass index is 27.66 kg/m  as calculated from the following:    Height as of  "12/10/20: 1.635 m (5' 4.37\").    Weight as of 12/10/20: 73.9 kg (163 lb).  Virtual visit, no BP      Review of Systems:  General: good mood, doing well  Neuro: no numbness or tingling  HEENT: no changes in vision, eye exam apt in March  CV: no chest pain  Resp: no SOB  GI: no pain, constipation, or diarrhea  : no dysuria  Feet: no changes or open wounds      Social History:  The patient has recently moved to [place] and has a new job working from home. She tries to use her standing desk. She doesn't smoke and drinks EtOH approx 1x/week.      Family History:  No family members with a history of diabetes.      Physical Exam:  Virtual visit. Cranial nerves intact.      Recent Labs   Lab Test 12/10/20  0953 12/10/20  0939 01/24/20  0852 04/05/19  0000   A1C 8.0*  --  9.1*  --    TSH  --  0.68  --  0.84   CR  --  0.78 0.77  --    HDL 58  --   --   --    LDL 79  --   --   --    TRIG 90  --   --   --    ALT  --  16 20  --    AST  --  11 17  --        Assessment and Plan:  1. Diabetes:  Isaiah is doing OK. Her A1C in Dec 2020 was 8.0% and it could be higher now. She has been slightly hyperglycemic recently but attributes it to her moving into a new home. We discussed keeping an eye on when she's consistently hyper- or hypoglycemic and can adjust insulin accordingly, such as lowering basal if she's consistently low in the mornings or increasing meal-time insulin if she has high postprandial sugars. She wants to stick to what she's doing now but is open to the Heriberto CGM after some discussion. The Heriberto might be a better option than the Dexcom due to size. We'll be in contact after her next A1C in March and she's OK revisiting the idea of a pump or CGM if her A1C is above 8.5%. We also talked about a general goal of getting her A1C into the low 7's to prevent future diabetic complications.    2. Diabetes complications:  No complications at the moment. Eye exam scheduled soon. No symptoms of neuropathy. Placed orders for " kidney function.    3. CVD risk:  Minimal at the moment. Lipids were ok in 2020 and likely OK now. She mentioned her last BP check was a couple months ago and was OK at that time.     4. New prescription for Alexis-pen that has 1/2 units to better control her meal-time bolus. New prescription for glucagon nasal spray.    5. F/U with Dr. Butler in 6 mo. Return sooner if A1C comes back higher.     This patient was seen with Lonnie Freeman (medical student) and Dr. Lola Butler.     This 26-year-old woman with type 1 diabetes was seen in follow-up on a virtual visit with medical student Lonnie Manning.  Please see his note for the full details of the visit.    I have not seen Ms. Barnes since December 2020.  She reports she remains on insulin injections and is content with this insulin delivery method.  She takes 21 units of Basaglar each night and 1 unit for 7 g of carb.  She feels confident with carb counting.  Her blood sugars are listed in the medical student's note.  She has no problems recognizing hypoglycemia and feels confident managing exercise.  She does believe her blood sugars are probably higher than they should be.  She would like to have her A1c checked and if it turns out that it is above 8, she be willing to discuss changing insulin delivery methods and or adding a continuous glucose monitor.    She is scheduled to see her eye doctor.  She has no problems with vision.  She denies any paresthesias.  Overall she feels well.    She thinks her glucagon next prescription is outdated.    Current Outpatient Medications   Medication     Glucagon (BAQSIMI ONE PACK) 3 MG/DOSE POWD     HUMALOG JENNIFER KWIKPEN 100 UNIT/ML (0.5 unit dial) KWIKPEN     blood glucose (NO BRAND SPECIFIED) lancets standard     blood glucose (NO BRAND SPECIFIED) test strip     insulin aspart (NOVOLOG PEN) 100 UNIT/ML pen     insulin degludec (TRESIBA FLEXTOUCH) 100 UNIT/ML pen     insulin glargine (LANTUS SOLOSTAR) 100 UNIT/ML pen      insulin pen needle (32G X 4 MM) 32G X 4 MM miscellaneous     levonorgestrel-ethinyl estradiol (AVIANE) 0.1-20 MG-MCG tablet     NOVOLOG PENFILL 100 UNIT/ML soln     No current facility-administered medications for this visit.     Patient Active Problem List   Diagnosis     Type 1 diabetes mellitus without complication (H)     Family history of colon cancer     On exam she is in no acute distress.  Mood is upbeat.  Cranial nerves are grossly intact.    Recent Labs   Lab Test 12/10/20  1000 12/10/20  0953 12/10/20  0939 01/24/20  0852 04/05/19  0000   A1C  --  8.0*  --  9.1*  --    TSH  --   --  0.68  --  0.84   LDL  --  79  --   --   --    HDL  --  58  --   --   --    TRIG  --  90  --   --   --    CR  --   --  0.78 0.77  --    MICROL 6  --   --   --   --      Assessment and plan:    1.  Diabetes control.  We will check her A1c to assess her overall level of control.  I reminded her that our target A1c close to 7 is optimal if her goal is to minimize her risk of developing diabetes complications.  I told her we have many tools that can help her manage her diabetes and that we should think about employing some of them if her A1c is above 8.  She is used the Dexcom in the past and thought it was too large to wear all the time.  She might consider wearing a perla.  If the A1c is elevated, I will make that suggestion when I see her that day.  We made no changes in her insulin dosing today.  I ordered her nasal glucagon and half unit Humalog pens.  I am hoping that the insurance company will cover this because she would prefer half unit prefilled pens.  Humalog is the only maker of this product so she will have to switch from NovoLog, which may be on formulary.    2.  Diabetes complications.  She has no problems with retinopathy and is scheduled to see her eye doctor in the next few weeks.  She has no paresthesias.  I will check her renal function.    Follow-up in 6 months, sooner if her A1c is above target.      I spent  20 minutes with the patient on the video visit (start time 2: 4 0 and end time 3: 00).  On the day of visit I spent an additional 15 minutes reviewing her chart, doing documentation, and ordering medications and lab tests.      Lola Butler MD

## 2022-02-22 NOTE — PATIENT INSTRUCTIONS
I ordered the Humalog Mervin KwikPen for you.  It has half units and is prefilled.  The only NovoLog product that does that is the echo pen which requires cartridges.  Let me know if you prefer something else.    The glucose sensor I talked about was called perla freestyle.  It is very small and looks more like a patch than the sensor.  That might be something you want to consider using.    I scheduled a follow-up with me in 6 months.  If the A1c is above 8.5 we might want to have her back sooner to see one of the physician assistants.  You let me know if that the case.    I ordered your A1c and kidney function tests to be done in the lab    I ordered the nasal spray glucagon for youl

## 2022-02-23 NOTE — TELEPHONE ENCOUNTER
PRIOR AUTHORIZATION DENIED    Medication: Humalog Mervin Kwikpen - PA Denied    Denial Date: 2/23/2022    Denial Rational:     Appeal Information:

## 2022-02-23 NOTE — PROGRESS NOTES
This 26-year-old woman with type 1 diabetes was seen in follow-up on a virtual visit with medical student Lonnie Manning.  Please see his note for the full details of the visit.    I have not seen Ms. Barnes since December 2020.  She reports she remains on insulin injections and is content with this insulin delivery method.  She takes 21 units of Basaglar each night and 1 unit for 7 g of carb.  She feels confident with carb counting.  Her blood sugars are listed in the medical student's note.  She has no problems recognizing hypoglycemia and feels confident managing exercise.  She does believe her blood sugars are probably higher than they should be.  She would like to have her A1c checked and if it turns out that it is above 8, she be willing to discuss changing insulin delivery methods and or adding a continuous glucose monitor.    She is scheduled to see her eye doctor.  She has no problems with vision.  She denies any paresthesias.  Overall she feels well.    She thinks her glucagon next prescription is outdated.    Current Outpatient Medications   Medication     Glucagon (BAQSIMI ONE PACK) 3 MG/DOSE POWD     HUMALOG JENNIFER KWIKPEN 100 UNIT/ML (0.5 unit dial) KWIKPEN     blood glucose (NO BRAND SPECIFIED) lancets standard     blood glucose (NO BRAND SPECIFIED) test strip     insulin aspart (NOVOLOG PEN) 100 UNIT/ML pen     insulin degludec (TRESIBA FLEXTOUCH) 100 UNIT/ML pen     insulin glargine (LANTUS SOLOSTAR) 100 UNIT/ML pen     insulin pen needle (32G X 4 MM) 32G X 4 MM miscellaneous     levonorgestrel-ethinyl estradiol (AVIANE) 0.1-20 MG-MCG tablet     NOVOLOG PENFILL 100 UNIT/ML soln     No current facility-administered medications for this visit.     Patient Active Problem List   Diagnosis     Type 1 diabetes mellitus without complication (H)     Family history of colon cancer     On exam she is in no acute distress.  Mood is upbeat.  Cranial nerves are grossly intact.    Recent Labs   Lab Test 12/10/20  1000  12/10/20  0953 12/10/20  0939 01/24/20  0852 04/05/19  0000   A1C  --  8.0*  --  9.1*  --    TSH  --   --  0.68  --  0.84   LDL  --  79  --   --   --    HDL  --  58  --   --   --    TRIG  --  90  --   --   --    CR  --   --  0.78 0.77  --    MICROL 6  --   --   --   --      Assessment and plan:    1.  Diabetes control.  We will check her A1c to assess her overall level of control.  I reminded her that our target A1c close to 7 is optimal if her goal is to minimize her risk of developing diabetes complications.  I told her we have many tools that can help her manage her diabetes and that we should think about employing some of them if her A1c is above 8.  She is used the Dexcom in the past and thought it was too large to wear all the time.  She might consider wearing a perla.  If the A1c is elevated, I will make that suggestion when I see her that day.  We made no changes in her insulin dosing today.  I ordered her nasal glucagon and half unit Humalog pens.  I am hoping that the insurance company will cover this because she would prefer half unit prefilled pens.  Humalog is the only maker of this product so she will have to switch from NovoLog, which may be on formulary.    2.  Diabetes complications.  She has no problems with retinopathy and is scheduled to see her eye doctor in the next few weeks.  She has no paresthesias.  I will check her renal function.    Follow-up in 6 months, sooner if her A1c is above target.      I spent 20 minutes with the patient on the video visit (start time 2: 4 0 and end time 3: 00).  On the day of visit I spent an additional 15 minutes reviewing her chart, doing documentation, and ordering medications and lab tests.      Lola Butler MD

## 2022-02-25 NOTE — TELEPHONE ENCOUNTER
Medication Appeal Initiation    We have initiated an appeal for the requested medication:  Medication: Humalog Mervin Kwikpen - Appeal Pending  Appeal Start Date:     Insurance Company:    Comments:

## 2022-03-02 DIAGNOSIS — E10.9 TYPE 1 DIABETES MELLITUS WITHOUT COMPLICATION (H): Primary | ICD-10-CM

## 2022-03-02 DIAGNOSIS — E10.9 TYPE 1 DIABETES MELLITUS WITHOUT COMPLICATION (H): ICD-10-CM

## 2022-03-02 RX ORDER — INSULIN ASPART 100 [IU]/ML
INJECTION, SOLUTION INTRAVENOUS; SUBCUTANEOUS
Qty: 15 ML | Refills: 3 | Status: SHIPPED | OUTPATIENT
Start: 2022-03-02 | End: 2022-03-03

## 2022-03-03 ENCOUNTER — TRANSFERRED RECORDS (OUTPATIENT)
Dept: HEALTH INFORMATION MANAGEMENT | Facility: CLINIC | Age: 27
End: 2022-03-03
Payer: COMMERCIAL

## 2022-03-03 ENCOUNTER — TELEPHONE (OUTPATIENT)
Dept: ENDOCRINOLOGY | Facility: CLINIC | Age: 27
End: 2022-03-03
Payer: COMMERCIAL

## 2022-03-03 DIAGNOSIS — E10.9 TYPE 1 DIABETES MELLITUS WITHOUT COMPLICATION (H): Primary | ICD-10-CM

## 2022-03-03 LAB — RETINOPATHY: NORMAL

## 2022-03-03 RX ORDER — INSULIN LISPRO 100 [IU]/ML
INJECTION, SOLUTION INTRAVENOUS; SUBCUTANEOUS
Qty: 30 ML | Refills: 3 | Status: SHIPPED | OUTPATIENT
Start: 2022-03-03 | End: 2022-03-22

## 2022-03-03 RX ORDER — INSULIN ASPART 100 [IU]/ML
INJECTION, SOLUTION INTRAVENOUS; SUBCUTANEOUS
Qty: 30 ML | Refills: 3 | Status: SHIPPED | OUTPATIENT
Start: 2022-03-03 | End: 2023-03-21

## 2022-03-03 NOTE — TELEPHONE ENCOUNTER
Humalog Kwikpen not covered. Preferred is Novolog or Fiasp. Would you like to change to preferred or PA

## 2022-03-03 NOTE — TELEPHONE ENCOUNTER
She already had a Novolog order that was deleted. Novolog flexpen sent to the pharmacy again  For refill per Dr Butler. Ruma Mena RN on 3/3/2022 at 9:45 AM

## 2022-03-03 NOTE — TELEPHONE ENCOUNTER
NOVOLOG FLEXPEN 100 UNIT/ML soln    Last Written Prescription Date:  3/2/22  Last Fill Quantity: 15ml    # refills: 3  Last Office Visit : 9/6/22  Future Office visit:  none    Routing refill request to provider for review/approval because:  Insulin - refilled per clinic

## 2022-03-14 NOTE — TELEPHONE ENCOUNTER
Humalog Kwikpen u100 sent in. I dont see that she is doing 1/2 units so the Jr is needed . Ruma Mena RN on 3/14/2022 at 1:23 PM

## 2022-03-14 NOTE — TELEPHONE ENCOUNTER
MEDICATION APPEAL DENIED    Medication: Humalog Mervin Kwikpen - Appeal Denied    Denial Date:      Denial Rational:     Second Level Appeal Information:       Second level appeals will be managed by the clinic staff and provider. Please contact the [employer] Prior Authorization Team if additional information about the denial is needed.

## 2022-03-19 ENCOUNTER — HEALTH MAINTENANCE LETTER (OUTPATIENT)
Age: 27
End: 2022-03-19

## 2022-03-21 PROBLEM — E10.65 TYPE 1 DIABETES MELLITUS WITH HYPERGLYCEMIA (H): Status: ACTIVE | Noted: 2022-03-21

## 2022-03-21 NOTE — PROGRESS NOTES
SUBJECTIVE:   CC: Isaiah Barnes is an 26 year old woman who presents for preventive health visit.     Patient has been advised of split billing requirements and indicates understanding: Yes       History of Present Illness       Diabetes:   She presents for follow up of diabetes.  She is checking home blood glucose three times daily. She checks blood glucose before meals.  Blood glucose is never over 200 and never under 70. She is aware of hypoglycemia symptoms including shakiness and weakness. She has no concerns regarding her diabetes at this time.  She is not experiencing numbness or burning in feet, excessive thirst, blurry vision, weight changes or redness, sores or blisters on feet. The patient has had a diabetic eye exam in the last 12 months. Eye exam performed on 3/3/22. Location of last eye exam [facility].        She eats 4 or more servings of fruits and vegetables daily.She consumes 0 sweetened beverage(s) daily.She exercises with enough effort to increase her heart rate 30 to 60 minutes per day.  She exercises with enough effort to increase her heart rate 5 days per week.   She is taking medications regularly.    Lab Results   Component Value Date    A1C 7.8 03/22/2022    A1C 8.0 12/10/2020    A1C 9.1 01/24/2020       Today's PHQ-2 Score:   PHQ-2 ( 1999 Pfizer) 3/22/2022   Q1: Little interest or pleasure in doing things 0   Q2: Feeling down, depressed or hopeless 0   PHQ-2 Score 0   PHQ-2 Total Score (12-17 Years)- Positive if 3 or more points; Administer PHQ-A if positive -   Q1: Little interest or pleasure in doing things Not at all   Q2: Feeling down, depressed or hopeless Not at all   PHQ-2 Score 0       Abuse: Current or Past (Physical, Sexual or Emotional) - No  Do you feel safe in your environment? Yes    Have you ever done Advance Care Planning? (For example, a Health Directive, POLST, or a discussion with a medical provider or your loved ones about your wishes):  No, advance care planning information given to patient to review.  Advanced care planning was discussed at today's visit.    Social History     Tobacco Use     Smoking status: Never Smoker     Smokeless tobacco: Never Used   Substance Use Topics     Alcohol use: Yes     If you drink alcohol do you typically have >3 drinks per day or >7 drinks per week? No      Reviewed orders with patient.  Reviewed health maintenance and updated orders accordingly - Yes      Breast Cancer Screening:  Any new diagnosis of family breast, ovarian, or bowel cancer? No    FHS-7:   Breast CA Risk Assessment (FHS-7) 3/22/2022   Did any of your first-degree relatives have breast or ovarian cancer? No   Did any of your relatives have bilateral breast cancer? No   Did any man in your family have breast cancer? No   Did any woman in your family have breast and ovarian cancer? No   Did any woman in your family have breast cancer before age 50 y? No   Do you have 2 or more relatives with breast and/or ovarian cancer? No   Do you have 2 or more relatives with breast and/or bowel cancer? No       Patient under 40 years of age: Routine Mammogram Screening not recommended.   Pertinent mammograms are reviewed under the imaging tab.    History of abnormal Pap smear: NO - age 21-29 PAP every 3 years recommended     Reviewed and updated as needed this visit by clinical staff   Tobacco  Allergies  Meds   Med Hx  Surg Hx  Fam Hx  Soc Hx        Reviewed and updated as needed this visit by Provider                     Review of Systems  GENERAL HEALTH SYMPTOMS No   SKIN SYMPTOMS No   HEAD, EARS, NOSE AND THROAT SYMPTOMS No   EYE SYMPTOMS No   HEART SYMPTOMS No   LUNG SYMPTOMS No   INTESTINAL SYMPTOMS No   URINARY SYMPTOMS No   GYNECOLOGIC SYMPTOMS - none, does have new partner since STI last checked, no barrier method No   BREAST SYMPTOMS No   SKELETAL SYMPTOMS No   BLOOD SYMPTOMS No   NERVOUS SYSTEM SYMPTOMS No   MENTAL HEALTH SYMPTOMS No       "  OBJECTIVE:   /86   Pulse 77   Temp 98.1  F (36.7  C) (Temporal)   Ht 1.63 m (5' 4.17\")   Wt 76.9 kg (169 lb 8 oz)   LMP 03/01/2022 (Exact Date)   SpO2 98%   BMI 28.94 kg/m    Physical Exam  GENERAL: healthy, alert and no distress  EYES: Eyes grossly normal to inspection, PERRL and conjunctivae and sclerae normal  HENT: ear canals and TM's normal, nose and mouth without ulcers or lesions  NECK: no adenopathy, no asymmetry, masses, or scars and thyroid normal to palpation  RESP: lungs clear to auscultation - no rales, rhonchi or wheezes  BREAST: normal without masses, tenderness or nipple discharge and no palpable axillary masses or adenopathy  CV: regular rate and rhythm, normal S1 S2, no S3 or S4, no murmur, click or rub, no peripheral edema and peripheral pulses strong  ABDOMEN: soft, nontender, no hepatosplenomegaly, no masses and bowel sounds normal   (female): normal female external genitalia, normal urethral meatus, vaginal mucosa pink, moist, well rugated, and normal cervix/adnexa/uterus without masses or discharge  MS: no gross musculoskeletal defects noted, no edema  SKIN: no suspicious lesions or rashes  NEURO: Normal strength and tone, mentation intact and speech normal  PSYCH: mentation appears normal, affect normal/bright  Diabetic foot exam: normal DP and PT pulses, no trophic changes or ulcerative lesions and normal sensory exam    Diagnostic Test Results:  Labs reviewed in Epic    ASSESSMENT/PLAN:   (Z00.00) Routine general medical examination at a health care facility  (primary encounter diagnosis)  Comment: Doing well overall  Plan: Given HPV, influenza, and Pfizer COVID booster. ACP discussed.     (E10.9) Type 1 diabetes mellitus without complication (H)  Comment: Stable. Follows with endocrine q 6 mths. Virtual visit 2/22/22.   Plan: insulin glargine (BASAGLAR KWIKPEN) 100 UNIT/ML        pen, NOVOLOG FLEXPEN 100 unit/ml, Basic metabolic panel  (Ca, Cl, CO2,         Creat, Gluc, K, " "Na, BUN), FOOT EXAM   Labs endo wrote today plus FP labs  A1C 7.8 so below minimum goal of 8 and lower than previous two A1Cs - defer more aggressive goals to endocrinology.  Meds and supplies refilled as needed  .    (Z12.4) Screening for cervical cancer  Comment:   Plan: PAP screen reflex to HPV if ASCUS - recommended        age 25 - 29 years            (E10.65) Type 1 diabetes mellitus with hyperglycemia (H)  Comment: See above  Plan: blood glucose (NO BRAND SPECIFIED) test strip            (Z23) Need for COVID-19 vaccine  Comment: Had COVID-19 infection .   Plan: symptoms entirely resolved    (Z30.41) Encounter for surveillance of contraceptive pills  Comment:   Plan: levonorgestrel-ethinyl estradiol (AVIANE)         0.1-20 MG-MCG tablet            (Z11.3) Routine screening for STI (sexually transmitted infection)  Comment:   Plan: HIV Antigen Antibody Combo, Treponema Abs w         Reflex to RPR and Titer, NEISSERIA GONORRHOEA         PCR, CHLAMYDIA TRACHOMATIS PCR    New partner since last checked    (Z23) Need for HPV vaccination  Comment:   Plan: HPV, IM (9 - 26 YRS) - Gardasil 9              Patient has been advised of split billing requirements and indicates understanding: Yes    COUNSELING:  Reviewed preventive health counseling, as reflected in patient instructions  Special attention given to:        Regular exercise       Healthy diet/nutrition       Vision screening       Immunizations    Vaccinated for: Human Papillomavirus and Influenza, Pfizer COVID-19       Contraception       Osteoporosis prevention/bone health       Safe sex practices/STD prevention       HIV screeninx in teen years, 1x in adult years, and at intervals if high risk       Advance Care Planning    Estimated body mass index is 28.94 kg/m  as calculated from the following:    Height as of this encounter: 1.63 m (5' 4.17\").    Weight as of this encounter: 76.9 kg (169 lb 8 oz).    Weight management plan: Discussed healthy diet " and exercise guidelines    She reports that she has never smoked. She has never used smokeless tobacco.      Counseling Resources:  ATP IV Guidelines  Pooled Cohorts Equation Calculator  Breast Cancer Risk Calculator  BRCA-Related Cancer Risk Assessment: FHS-7 Tool  FRAX Risk Assessment  ICSI Preventive Guidelines  Dietary Guidelines for Americans, 2010  USDA's MyPlate  ASA Prophylaxis  Lung CA Screening    VELASQUEZ Adorno CNP  [facility]

## 2022-03-22 ENCOUNTER — LAB (OUTPATIENT)
Dept: LAB | Facility: CLINIC | Age: 27
End: 2022-03-22
Payer: COMMERCIAL

## 2022-03-22 ENCOUNTER — OFFICE VISIT (OUTPATIENT)
Dept: FAMILY MEDICINE | Facility: CLINIC | Age: 27
End: 2022-03-22
Payer: COMMERCIAL

## 2022-03-22 VITALS
WEIGHT: 169.5 LBS | DIASTOLIC BLOOD PRESSURE: 86 MMHG | HEIGHT: 64 IN | BODY MASS INDEX: 28.94 KG/M2 | TEMPERATURE: 98.1 F | OXYGEN SATURATION: 98 % | HEART RATE: 77 BPM | SYSTOLIC BLOOD PRESSURE: 133 MMHG

## 2022-03-22 DIAGNOSIS — Z23 NEED FOR COVID-19 VACCINE: ICD-10-CM

## 2022-03-22 DIAGNOSIS — Z12.4 SCREENING FOR CERVICAL CANCER: ICD-10-CM

## 2022-03-22 DIAGNOSIS — Z11.3 ROUTINE SCREENING FOR STI (SEXUALLY TRANSMITTED INFECTION): ICD-10-CM

## 2022-03-22 DIAGNOSIS — E10.9 TYPE 1 DIABETES MELLITUS WITHOUT COMPLICATION (H): ICD-10-CM

## 2022-03-22 DIAGNOSIS — E10.65 TYPE 1 DIABETES MELLITUS WITH HYPERGLYCEMIA (H): ICD-10-CM

## 2022-03-22 DIAGNOSIS — Z30.41 ENCOUNTER FOR SURVEILLANCE OF CONTRACEPTIVE PILLS: ICD-10-CM

## 2022-03-22 DIAGNOSIS — Z00.00 ROUTINE GENERAL MEDICAL EXAMINATION AT A HEALTH CARE FACILITY: Primary | ICD-10-CM

## 2022-03-22 DIAGNOSIS — E83.52 SERUM CALCIUM ELEVATED: ICD-10-CM

## 2022-03-22 DIAGNOSIS — Z23 NEED FOR HPV VACCINATION: ICD-10-CM

## 2022-03-22 LAB
ANION GAP SERPL CALCULATED.3IONS-SCNC: 7 MMOL/L (ref 3–14)
BUN SERPL-MCNC: 12 MG/DL (ref 7–30)
CALCIUM SERPL-MCNC: 10.3 MG/DL (ref 8.5–10.1)
CHLORIDE BLD-SCNC: 108 MMOL/L (ref 94–109)
CO2 SERPL-SCNC: 24 MMOL/L (ref 20–32)
CREAT SERPL-MCNC: 0.8 MG/DL (ref 0.52–1.04)
CREAT UR-MCNC: 134 MG/DL
GFR SERPL CREATININE-BSD FRML MDRD: >90 ML/MIN/1.73M2
GLUCOSE BLD-MCNC: 82 MG/DL (ref 70–99)
HBA1C MFR BLD: 7.8 % (ref 0–5.6)
HIV 1+2 AB+HIV1 P24 AG SERPL QL IA: NONREACTIVE
MICROALBUMIN UR-MCNC: <5 MG/L
MICROALBUMIN/CREAT UR: NORMAL MG/G{CREAT}
POTASSIUM BLD-SCNC: 4.2 MMOL/L (ref 3.4–5.3)
SODIUM SERPL-SCNC: 139 MMOL/L (ref 133–144)
T PALLIDUM AB SER QL: NONREACTIVE

## 2022-03-22 PROCEDURE — 90651 9VHPV VACCINE 2/3 DOSE IM: CPT | Performed by: NURSE PRACTITIONER

## 2022-03-22 PROCEDURE — 90472 IMMUNIZATION ADMIN EACH ADD: CPT | Performed by: NURSE PRACTITIONER

## 2022-03-22 PROCEDURE — G0145 SCR C/V CYTO,THINLAYER,RESCR: HCPCS | Performed by: NURSE PRACTITIONER

## 2022-03-22 PROCEDURE — 90686 IIV4 VACC NO PRSV 0.5 ML IM: CPT | Performed by: NURSE PRACTITIONER

## 2022-03-22 PROCEDURE — 83036 HEMOGLOBIN GLYCOSYLATED A1C: CPT

## 2022-03-22 PROCEDURE — 86780 TREPONEMA PALLIDUM: CPT

## 2022-03-22 PROCEDURE — 90471 IMMUNIZATION ADMIN: CPT | Performed by: NURSE PRACTITIONER

## 2022-03-22 PROCEDURE — 87389 HIV-1 AG W/HIV-1&-2 AB AG IA: CPT

## 2022-03-22 PROCEDURE — 0054A COVID-19,PF,PFIZER (12+ YRS): CPT | Performed by: NURSE PRACTITIONER

## 2022-03-22 PROCEDURE — 99395 PREV VISIT EST AGE 18-39: CPT | Mod: 25 | Performed by: NURSE PRACTITIONER

## 2022-03-22 PROCEDURE — 91305 COVID-19,PF,PFIZER (12+ YRS): CPT | Performed by: NURSE PRACTITIONER

## 2022-03-22 PROCEDURE — 99207 PR FOOT EXAM NO CHARGE: CPT | Mod: 25 | Performed by: NURSE PRACTITIONER

## 2022-03-22 PROCEDURE — 36415 COLL VENOUS BLD VENIPUNCTURE: CPT

## 2022-03-22 PROCEDURE — 99214 OFFICE O/P EST MOD 30 MIN: CPT | Mod: 25 | Performed by: NURSE PRACTITIONER

## 2022-03-22 PROCEDURE — 82043 UR ALBUMIN QUANTITATIVE: CPT

## 2022-03-22 PROCEDURE — 87491 CHLMYD TRACH DNA AMP PROBE: CPT | Performed by: NURSE PRACTITIONER

## 2022-03-22 PROCEDURE — 87591 N.GONORRHOEAE DNA AMP PROB: CPT

## 2022-03-22 PROCEDURE — 80048 BASIC METABOLIC PNL TOTAL CA: CPT

## 2022-03-22 RX ORDER — LEVONORGESTREL/ETHIN.ESTRADIOL 0.1-0.02MG
1 TABLET ORAL DAILY
Qty: 84 TABLET | Refills: 4 | Status: SHIPPED | OUTPATIENT
Start: 2022-03-22 | End: 2022-07-08

## 2022-03-22 RX ORDER — INSULIN GLARGINE 100 [IU]/ML
21 INJECTION, SOLUTION SUBCUTANEOUS DAILY
Qty: 21 ML | Refills: 1 | Status: SHIPPED | OUTPATIENT
Start: 2022-03-22 | End: 2022-07-08

## 2022-03-22 NOTE — Clinical Note
Eye exam performed on 3/3/22. Location of last eye exam [facility].   Eye exam performed on 3/3/22. Location of last eye exam [facility].

## 2022-03-23 LAB
C TRACH DNA SPEC QL NAA+PROBE: NEGATIVE
N GONORRHOEA DNA SPEC QL NAA+PROBE: NEGATIVE

## 2022-03-25 LAB
BKR LAB AP GYN ADEQUACY: NORMAL
BKR LAB AP GYN INTERPRETATION: NORMAL
BKR LAB AP HPV REFLEX: NORMAL
BKR LAB AP PREVIOUS ABNORMAL: NORMAL
PATH REPORT.COMMENTS IMP SPEC: NORMAL
PATH REPORT.COMMENTS IMP SPEC: NORMAL
PATH REPORT.RELEVANT HX SPEC: NORMAL

## 2022-07-07 DIAGNOSIS — Z30.41 ENCOUNTER FOR SURVEILLANCE OF CONTRACEPTIVE PILLS: ICD-10-CM

## 2022-07-07 DIAGNOSIS — E10.9 TYPE 1 DIABETES MELLITUS WITHOUT COMPLICATION (H): ICD-10-CM

## 2022-07-08 RX ORDER — INSULIN GLARGINE 100 [IU]/ML
21 INJECTION, SOLUTION SUBCUTANEOUS DAILY
Qty: 21 ML | Refills: 1 | Status: SHIPPED | OUTPATIENT
Start: 2022-07-08 | End: 2023-02-16

## 2022-07-08 RX ORDER — LEVONORGESTREL/ETHIN.ESTRADIOL 0.1-0.02MG
1 TABLET ORAL DAILY
Qty: 84 TABLET | Refills: 4 | Status: SHIPPED | OUTPATIENT
Start: 2022-07-08 | End: 2023-08-14

## 2022-07-08 NOTE — TELEPHONE ENCOUNTER
"Requested Prescriptions   Pending Prescriptions Disp Refills     insulin glargine (BASAGLAR KWIKPEN) 100 UNIT/ML pen 21 mL 1     Sig: Inject 21 Units Subcutaneous daily       Long Acting Insulin Protocol Passed - 7/7/2022  5:22 PM        Passed - Serum creatinine on file in past 12 months     Recent Labs   Lab Test 03/22/22  1017   CR 0.80       Ok to refill medication if creatinine is low          Passed - HgbA1C in past 3 or 6 months     If HgbA1C is 8 or greater, it needs to be on file within the past 3 months.  If less than 8, must be on file within the past 6 months.     Recent Labs   Lab Test 03/22/22  1017   A1C 7.8*             Passed - Medication is active on med list        Passed - Patient is age 18 or older        Passed - Recent (6 mo) or future (30 days) visit within the authorizing provider's specialty     Patient had office visit in the last 6 months or has a visit in the next 30 days with authorizing provider or within the authorizing provider's specialty.  See \"Patient Info\" tab in inbasket, or \"Choose Columns\" in Meds & Orders section of the refill encounter.               levonorgestrel-ethinyl estradiol (AVIANE) 0.1-20 MG-MCG tablet 84 tablet 4     Sig: Take 1 tablet by mouth daily       Contraceptives Protocol Passed - 7/7/2022  5:22 PM        Passed - Patient is not a current smoker if age is 35 or older        Passed - Recent (12 mo) or future (30 days) visit within the authorizing provider's specialty     Patient has had an office visit with the authorizing provider or a provider within the authorizing providers department within the previous 12 mos or has a future within next 30 days. See \"Patient Info\" tab in inbasket, or \"Choose Columns\" in Meds & Orders section of the refill encounter.              Passed - Medication is active on med list        Passed - No active pregnancy on record        Passed - No positive pregnancy test in past 12 months           Julianne JACKSON RN    "

## 2022-07-09 ENCOUNTER — HEALTH MAINTENANCE LETTER (OUTPATIENT)
Age: 27
End: 2022-07-09

## 2022-09-03 ENCOUNTER — HEALTH MAINTENANCE LETTER (OUTPATIENT)
Age: 27
End: 2022-09-03

## 2023-01-15 ENCOUNTER — HEALTH MAINTENANCE LETTER (OUTPATIENT)
Age: 28
End: 2023-01-15

## 2023-02-16 ENCOUNTER — TELEPHONE (OUTPATIENT)
Dept: FAMILY MEDICINE | Facility: CLINIC | Age: 28
End: 2023-02-16
Payer: COMMERCIAL

## 2023-02-16 DIAGNOSIS — E10.9 TYPE 1 DIABETES MELLITUS WITHOUT COMPLICATION (H): ICD-10-CM

## 2023-02-16 RX ORDER — INSULIN GLARGINE 100 [IU]/ML
21 INJECTION, SOLUTION SUBCUTANEOUS DAILY
Qty: 21 ML | Refills: 1 | Status: SHIPPED | OUTPATIENT
Start: 2023-02-16 | End: 2023-07-26

## 2023-02-16 NOTE — TELEPHONE ENCOUNTER
[facility] phone call message- patient requests medication or medication refill:    If this is a refill request, has the caller requested the refill from the pharmacy already? Yes  Name of the pharmacy and phone number for the current request:  [facility]    Name of the medication requested: insulin glargine (BASAGLAR KWIKPEN) 100 UNIT/ML pen    Other request: [facility] sent fax over requesting for a refill for this med. In the pharmacy  Comments it is asking for a new prescription of this. I called the pharmacy for more details but was not able to reach someone. [phone]. Placed fax in provider folder.     OK to leave the result message on voice mail or with a family member? YES    par Call taken on 2/16/2023 at 11:26 AM by LAVONNE RAMOS MA

## 2023-02-16 NOTE — TELEPHONE ENCOUNTER
Requested Prescriptions   Pending Prescriptions Disp Refills     insulin glargine (BASAGLAR KWIKPEN) 100 UNIT/ML pen 21 mL 1     Sig: Inject 21 Units Subcutaneous daily       There is no refill protocol information for this order        Routing refill request to provider for review/approval because:  Drug not on the [facility] refill protocol     Vivienne Birch RN  [facility]

## 2023-03-21 ENCOUNTER — OFFICE VISIT (OUTPATIENT)
Dept: FAMILY MEDICINE | Facility: CLINIC | Age: 28
End: 2023-03-21
Payer: COMMERCIAL

## 2023-03-21 ENCOUNTER — APPOINTMENT (OUTPATIENT)
Dept: LAB | Facility: CLINIC | Age: 28
End: 2023-03-21
Payer: COMMERCIAL

## 2023-03-21 VITALS
RESPIRATION RATE: 10 BRPM | OXYGEN SATURATION: 99 % | BODY MASS INDEX: 28.85 KG/M2 | SYSTOLIC BLOOD PRESSURE: 118 MMHG | HEART RATE: 76 BPM | WEIGHT: 169 LBS | TEMPERATURE: 97.8 F | DIASTOLIC BLOOD PRESSURE: 85 MMHG | HEIGHT: 64 IN

## 2023-03-21 DIAGNOSIS — E10.9 TYPE 1 DIABETES MELLITUS WITHOUT COMPLICATION (H): ICD-10-CM

## 2023-03-21 DIAGNOSIS — E83.52 SERUM CALCIUM ELEVATED: ICD-10-CM

## 2023-03-21 DIAGNOSIS — E10.65 TYPE 1 DIABETES MELLITUS WITH HYPERGLYCEMIA (H): ICD-10-CM

## 2023-03-21 LAB
ANION GAP SERPL CALCULATED.3IONS-SCNC: 11 MMOL/L (ref 7–15)
BUN SERPL-MCNC: 12.2 MG/DL (ref 6–20)
CALCIUM SERPL-MCNC: 9.4 MG/DL (ref 8.6–10)
CALCIUM SERPL-MCNC: 9.4 MG/DL (ref 8.6–10)
CHLORIDE SERPL-SCNC: 102 MMOL/L (ref 98–107)
CREAT SERPL-MCNC: 0.83 MG/DL (ref 0.51–0.95)
CREAT UR-MCNC: 42.2 MG/DL
DEPRECATED HCO3 PLAS-SCNC: 23 MMOL/L (ref 22–29)
GFR SERPL CREATININE-BSD FRML MDRD: >90 ML/MIN/1.73M2
GLUCOSE SERPL-MCNC: 209 MG/DL (ref 70–99)
HBA1C MFR BLD: 8.4 % (ref 0–5.6)
MICROALBUMIN UR-MCNC: <12 MG/L
MICROALBUMIN/CREAT UR: NORMAL MG/G{CREAT}
POTASSIUM SERPL-SCNC: 4.1 MMOL/L (ref 3.4–5.3)
SODIUM SERPL-SCNC: 136 MMOL/L (ref 136–145)

## 2023-03-21 PROCEDURE — 90471 IMMUNIZATION ADMIN: CPT | Performed by: NURSE PRACTITIONER

## 2023-03-21 PROCEDURE — 82310 ASSAY OF CALCIUM: CPT | Mod: 59 | Performed by: NURSE PRACTITIONER

## 2023-03-21 PROCEDURE — 99207 PR FOOT EXAM NO CHARGE: CPT | Performed by: NURSE PRACTITIONER

## 2023-03-21 PROCEDURE — 82570 ASSAY OF URINE CREATININE: CPT | Performed by: NURSE PRACTITIONER

## 2023-03-21 PROCEDURE — 36415 COLL VENOUS BLD VENIPUNCTURE: CPT | Performed by: NURSE PRACTITIONER

## 2023-03-21 PROCEDURE — 80048 BASIC METABOLIC PNL TOTAL CA: CPT | Performed by: NURSE PRACTITIONER

## 2023-03-21 PROCEDURE — 83036 HEMOGLOBIN GLYCOSYLATED A1C: CPT | Performed by: NURSE PRACTITIONER

## 2023-03-21 PROCEDURE — 99214 OFFICE O/P EST MOD 30 MIN: CPT | Mod: 25 | Performed by: NURSE PRACTITIONER

## 2023-03-21 PROCEDURE — 90686 IIV4 VACC NO PRSV 0.5 ML IM: CPT | Performed by: NURSE PRACTITIONER

## 2023-03-21 PROCEDURE — 82043 UR ALBUMIN QUANTITATIVE: CPT | Performed by: NURSE PRACTITIONER

## 2023-03-21 RX ORDER — INSULIN ASPART 100 [IU]/ML
INJECTION, SOLUTION INTRAVENOUS; SUBCUTANEOUS
Qty: 30 ML | Refills: 3 | Status: SHIPPED | OUTPATIENT
Start: 2023-03-21 | End: 2023-03-27

## 2023-03-21 ASSESSMENT — PAIN SCALES - GENERAL: PAINLEVEL: NO PAIN (0)

## 2023-03-21 NOTE — RESULT ENCOUNTER NOTE
Isaiah,    You were right on the dot with the prediction. A1C is up a bit. If you think an increase in the LA insulin would help, I would be happy to increase that, but it sounded like this was somewhat attributable to the switch to the Semglee and you are adjusting.  If you think it is adjusting down, let's not change now, but recheck. Let me know which direction you'd like to go.  If you have any questions, please feel free to contact the clinic.    JESSY Cates

## 2023-03-21 NOTE — PROGRESS NOTES
"  Assessment & Plan     Type 1 diabetes mellitus with hyperglycemia (H)  Had a change in medication related to insurance coverage and correlated changes in glucoses. This is stabilizing. No changes at the moment as expecting this to adjust. But close follow-up in three months  Lab Results   Component Value Date    A1C 8.4 03/21/2023    A1C 7.8 03/22/2022    A1C 8.0 12/10/2020    A1C 9.1 01/24/2020     - HEMOGLOBIN A1C; Future  - BASIC METABOLIC PANEL; Future  - Albumin Random Urine Quantitative with Creat Ratio; Future  - blood glucose (NO BRAND SPECIFIED) test strip; Use to test blood sugar 4 times daily or as directed.  - FOOT EXAM  - HEMOGLOBIN A1C  - BASIC METABOLIC PANEL  - Albumin Random Urine Quantitative with Creat Ratio    Type 1 diabetes mellitus without complication (H)  - blood glucose (NO BRAND SPECIFIED) test strip; Use to test blood sugar 4 times daily or as directed.  - FOOT EXAM    Serum calcium elevated  recheck  - Calcium    Review of prior external note(s) from - endo  Ordering of each unique test  Prescription drug management  31 minutes spent by me on the date of the encounter doing chart review, history and exam, documentation and further activities per the note       BMI:   Estimated body mass index is 28.85 kg/m  as calculated from the following:    Height as of this encounter: 1.63 m (5' 4.17\").    Weight as of this encounter: 76.7 kg (169 lb).   Weight management plan: Discussed healthy diet and exercise guidelines    There are no Patient Instructions on file for this visit.    VELASQUEZ Adorno [facility]    Mel Colon is a 27 year old presenting for the following health issues:    Diabetes (Follow up) and Recheck Medication (Test strips currently ordered are $75 with insurance wondering about switching to ones that will be covered better)      History of Present Illness       She eats 4 or more servings of fruits and vegetables daily.She " "consumes 0 sweetened beverage(s) daily.She exercises with enough effort to increase her heart rate 30 to 60 minutes per day.  She exercises with enough effort to increase her heart rate 4 days per week.   She is taking medications regularly.     Lab Results   Component Value Date    A1C 7.8 03/22/2022    A1C 8.0 12/10/2020    A1C 9.1 01/24/2020     Type 1 Diabetes: Diagnosed when she was 15. Current medication regimen is 21 units Glargine once a day and Novolog 1 unit per 7 grams of carbs. Preprandial blood glucose readings have been 's the lowest glucose she has had was 64. She felt shaky which made her check her blood sugar at the time. She occasionally gets down to 70 overnight. Her schedule for checking glucose is usually, 8 am, 1 pm, 7 pm, and 10:30pm. She reports that recently she has been struggling to know how many carbs are in her food since she has started to cook her own meals at home.At this time she is not interested in meeting with a nutritionist or diabetes educator. Has a family member that is a diabetes educator who she can go to for questions.     Eye Exam: scheduled for 3/27       Review of Systems   Constitutional, HEENT, cardiovascular, pulmonary, gi and gu systems are negative, except as otherwise noted.      Objective    BP (!) 155/99 (BP Location: Right arm, Patient Position: Sitting, Cuff Size: Adult Regular)   Pulse 76   Temp 97.8  F (36.6  C) (Temporal)   Resp 10   Ht 1.63 m (5' 4.17\")   Wt 76.7 kg (169 lb)   LMP 03/05/2023 (Approximate)   SpO2 99%   BMI 28.85 kg/m    Body mass index is 28.85 kg/m .  Physical Exam   GENERAL: healthy, alert and no distress  RESP: lungs clear to auscultation - no rales, rhonchi or wheezes  CV: regular rate and rhythm, normal S1 S2, no S3 or S4, no murmur, click or rub, no peripheral edema and peripheral pulses strong  MS: no gross musculoskeletal defects noted, no edema  Diabetic Foot Exam  Sensory exam of the foot is normal, tested with the " monofilament. Good pulses, no lesions or ulcers, good peripheral pulses.            Answers for HPI/ROS submitted by the patient on 3/21/2023  General Symptoms: No  Skin Symptoms: No  HENT Symptoms: No  EYE SYMPTOMS: No  HEART SYMPTOMS: No  LUNG SYMPTOMS: No  INTESTINAL SYMPTOMS: No  URINARY SYMPTOMS: No  GYNECOLOGIC SYMPTOMS: No  BREAST SYMPTOMS: No  SKELETAL SYMPTOMS: No  BLOOD SYMPTOMS: No  NERVOUS SYSTEM SYMPTOMS: No  MENTAL HEALTH SYMPTOMS: No    Present and preformed physical exam with student nurse-family practice. The patient was evaluated and managed, by Lubna Chance, MSN, RN  RN, SNP in collaboration with VELASQUEZ Cristina, CNP supervising clinical preceptor.    Documentation written by VELASQUEZ Cristina CNP    FNP Student

## 2023-03-26 ENCOUNTER — MYC MEDICAL ADVICE (OUTPATIENT)
Dept: FAMILY MEDICINE | Facility: CLINIC | Age: 28
End: 2023-03-26
Payer: COMMERCIAL

## 2023-03-26 DIAGNOSIS — E10.9 TYPE 1 DIABETES MELLITUS WITHOUT COMPLICATION (H): ICD-10-CM

## 2023-03-27 ENCOUNTER — TRANSFERRED RECORDS (OUTPATIENT)
Dept: MULTI SPECIALTY CLINIC | Facility: CLINIC | Age: 28
End: 2023-03-27

## 2023-03-27 DIAGNOSIS — E10.9 TYPE 1 DIABETES MELLITUS WITHOUT COMPLICATION (H): ICD-10-CM

## 2023-03-27 LAB — RETINOPATHY: NORMAL

## 2023-03-27 RX ORDER — INSULIN ASPART 100 [IU]/ML
INJECTION, SOLUTION INTRAVENOUS; SUBCUTANEOUS
Qty: 30 ML | Refills: 3 | Status: SHIPPED | OUTPATIENT
Start: 2023-03-27 | End: 2023-03-27

## 2023-03-27 RX ORDER — INSULIN ASPART 100 [IU]/ML
INJECTION, SOLUTION INTRAVENOUS; SUBCUTANEOUS
Qty: 30 ML | Refills: 3 | Status: SHIPPED | OUTPATIENT
Start: 2023-03-27 | End: 2023-10-11

## 2023-03-27 NOTE — TELEPHONE ENCOUNTER
Perlita    Pt called again the pharmacy needs clarified instruction on the novalog pen  NOVOLOG FLEXPEN 100 UNIT/ML soln 30 mL 3 3/27/2023  Yes   Sig: Use 1 unit for 7 grams up to total of 30 units per day     Please clear up the instruction of use  Pharmacy will not fill without the clarification     She will by tomorrow without the refill correction    Maegan Burnette RN   [facility]

## 2023-03-27 NOTE — TELEPHONE ENCOUNTER
"Per Cristiana,    \"Okay to fill for 3 month supply at current dose\"    Thanks!  Levi Tamayo RN   [facility]    "

## 2023-03-27 NOTE — TELEPHONE ENCOUNTER
What is the correction that needs to be made. I refilled what endocrinology had ordered last year so not sure what is incorrect.  Please ask JESSY Landis

## 2023-03-27 NOTE — TELEPHONE ENCOUNTER
Pt reports 1 unit per 7 grams of carbs. Takes with meals each day so total of 3-4 times a day up to 30 units per day.    Called pharmacy and discussed how pt reports how she uses the novolog and they updated the patient use instructions and stated they will work to get it filled.    Routing as patient states will run out of insulin tomorrow and would like advisement for ok for verbalization of pt instructions to pharmacist.    Thanks!  Levi Tamayo RN   [facility]

## 2023-03-27 NOTE — TELEPHONE ENCOUNTER
Pt requesting refill for true metrix blood glucose test strips and novolog flexpen.    Thanks!  Levi Tamayo RN   [facility]

## 2023-03-27 NOTE — TELEPHONE ENCOUNTER
"Requested Prescriptions   Pending Prescriptions Disp Refills     blood glucose (NO BRAND SPECIFIED) test strip 100 strip 1     Sig: Use to test blood sugar prn times daily or as directed.       Diabetic Supplies Protocol Passed - 3/27/2023 10:39 AM        Passed - Medication is active on med list        Passed - Patient is 18 years of age or older        Passed - Recent (6 mo) or future (30 days) visit within the authorizing provider's specialty     Patient had office visit in the last 6 months or has a visit in the next 30 days with authorizing provider.  See \"Patient Info\" tab in inbasket, or \"Choose Columns\" in Meds & Orders section of the refill encounter.               NOVOLOG FLEXPEN 100 UNIT/ML soln 30 mL 3     Sig: Use 1 unit for 7 grams up to total of 30 units per day       There is no refill protocol information for this order        Prescription approved per [facility] Refill Protocol.    Routing refill request to provider for review/approval because:  Drug not on the [facility] refill protocol     Pt calling stating she will be out tomorrow.     Vivienne Birch RN  [facility]   "

## 2023-03-27 NOTE — TELEPHONE ENCOUNTER
Perlita    Per pharm  How many units per day and how many times per day     Maegan Burnette RN   [facility]

## 2023-03-28 NOTE — TELEPHONE ENCOUNTER
"Great - are we all set with this medication? For refills in the future does it just need the wording \"1 unit per 7 grams of carbs. Takes with meals each for total of 3-4 times a day up to 30 units per day\" for them to accept it?  JESSY Cates   "

## 2023-04-29 ENCOUNTER — HEALTH MAINTENANCE LETTER (OUTPATIENT)
Age: 28
End: 2023-04-29

## 2023-06-05 DIAGNOSIS — E10.65 TYPE 1 DIABETES MELLITUS WITH HYPERGLYCEMIA (H): ICD-10-CM

## 2023-06-05 NOTE — TELEPHONE ENCOUNTER
"Requested Prescriptions   Pending Prescriptions Disp Refills     blood glucose (NO BRAND SPECIFIED) test strip 50 strip 11     Sig: Use to test blood sugar 3 times daily or as directed.       Diabetic Supplies Protocol Passed - 6/5/2023  9:57 AM        Passed - Medication is active on med list        Passed - Patient is 18 years of age or older        Passed - Recent (6 mo) or future (30 days) visit within the authorizing provider's specialty     Patient had office visit in the last 6 months or has a visit in the next 30 days with authorizing provider.  See \"Patient Info\" tab in inbasket, or \"Choose Columns\" in Meds & Orders section of the refill encounter.               Prescription approved per [facility] Refill Protocol.    Vivienne Birch RN  [facility]   "

## 2023-07-22 ENCOUNTER — HEALTH MAINTENANCE LETTER (OUTPATIENT)
Age: 28
End: 2023-07-22

## 2023-07-30 ENCOUNTER — NURSE TRIAGE (OUTPATIENT)
Dept: NURSING | Facility: CLINIC | Age: 28
End: 2023-07-30
Payer: COMMERCIAL

## 2023-07-30 DIAGNOSIS — E10.9 TYPE 1 DIABETES MELLITUS WITHOUT COMPLICATION (H): ICD-10-CM

## 2023-07-30 RX ORDER — INSULIN GLARGINE 100 [IU]/ML
21 INJECTION, SOLUTION SUBCUTANEOUS DAILY
Qty: 21 ML | Refills: 1 | Status: SHIPPED | OUTPATIENT
Start: 2023-07-30 | End: 2023-10-11

## 2023-07-30 NOTE — TELEPHONE ENCOUNTER
"RX for glargine not at pharmacy  Initial order was for  \"injection\" not  e scribe   Order  e scribed to pharmacy of choice and received   La Washington RN  FNA   Ordered Status Priority Ordering User Department   07/26/23 (none) (none) Soo Chris APRN CNP [facility]     Order History  Outpatient  Date/Time Action Taken User Additional Information   07/26/23 0935 Pend Stephen Oro RN    07/26/23 1239 Sign Soo Chris APRN CNP Reorder from Order:[id number]   07/30/23 1315 Reorder/Discontinue La Washington RN To Order:[id number]     Outpatient Medication Detail     Disp Refills Start End JUDY   insulin glargine (BASAGLAR KWIKPEN) 100 UNIT/ML pen (Discontinued) 21 mL 1 7/26/2023 7/30/2023 No   Sig - Route: Inject 21 Units Subcutaneous daily - Subcutaneous   Class: Injection   Notes to Pharmacy: If Basaglar is not covered by insurance, may substitute Lantus or Semglee or other insulin glargine product per insurance preference at same dose and frequency.     Reason for Discontinue: Reorder (No AVS)   Order: [id number]     Printout Tracking    External Result Report     Pharmacy      Order  insulin glargine (BASAGLAR KWIKPEN) 100 UNIT/ML pen [[id number]] (Order [id number])  Order Information    Ordered Status Priority Ordering User Department   07/30/23 Sent (none) La Washington RN [facility]     Order History  Outpatient  Date/Time Action Taken User Additional Information   07/30/23 1315 Sign La Washington RN Reorder from Order:[id number]; Ordering Mode: Scope of Practice - No Signature Required   07/30/23 1315 Taking Flag Checked La Washington RN [id number]     Outpatient Medication Detail     Disp Refills Start End JUDY   insulin glargine (BASAGLAR KWIKPEN) 100 UNIT/ML pen 21 mL 1 7/30/2023  No   Sig - Route: Inject 21 Units Subcutaneous daily - Subcutaneous   Sent to pharmacy as: Basaglar KwikPen 100 UNIT/ML Subcutaneous Solution Pen-injector   Class: E-Prescribe   Notes to " Pharmacy: If Basaglar is not covered by insurance, may substitute Lantus or Semglee or other insulin glargine product per insurance preference at same dose and frequency.     Order: [id number]   E-Prescribing Status: Receipt confirmed by pharmacy (7/30/2023  1:15 PM CDT)     Printout Tracking    External Result Report     Pharmacy    [id number] - [place] - [address]     Associated Diagnoses    Type 1 diabetes mellitus without complication (H) [E10.9]        Reason for Disposition    [1] Prescription prescribed recently is not at pharmacy AND [2] triager has access to patient's EMR AND [3] prescription is recorded in the EMR    Protocols used: Medication Refill and Renewal Call-A-AH

## 2023-08-14 ENCOUNTER — TELEPHONE (OUTPATIENT)
Dept: FAMILY MEDICINE | Facility: CLINIC | Age: 28
End: 2023-08-14
Payer: COMMERCIAL

## 2023-08-14 DIAGNOSIS — Z30.41 ENCOUNTER FOR SURVEILLANCE OF CONTRACEPTIVE PILLS: ICD-10-CM

## 2023-08-14 NOTE — TELEPHONE ENCOUNTER
Pt calling for a refill. Pt has a appointment in October.     Vivienne Birch RN  [facility]

## 2023-08-15 RX ORDER — LEVONORGESTREL/ETHIN.ESTRADIOL 0.1-0.02MG
1 TABLET ORAL DAILY
Qty: 84 TABLET | Refills: 0 | Status: SHIPPED | OUTPATIENT
Start: 2023-08-15 | End: 2023-10-11

## 2023-08-15 NOTE — TELEPHONE ENCOUNTER
Requested Prescriptions   Pending Prescriptions Disp Refills    levonorgestrel-ethinyl estradiol (AVIANE) 0.1-20 MG-MCG tablet 84 tablet 4     Sig: Take 1 tablet by mouth daily       There is no refill protocol information for this order         Routing refill request to provider for review/approval because:  Drug not on the [facility] refill protocol     Vivienne Birch RN  [facility]

## 2023-09-26 ENCOUNTER — TELEPHONE (OUTPATIENT)
Dept: FAMILY MEDICINE | Facility: CLINIC | Age: 28
End: 2023-09-26
Payer: COMMERCIAL

## 2023-09-26 NOTE — TELEPHONE ENCOUNTER
Patient Quality Outreach    Patient is due for the following:   Diabetes -  A1C and Eye Exam  Physical Preventive Adult Physical      Topic Date Due    Hepatitis B Vaccine (1 of 3 - 3-dose series) Never done    Pneumococcal Vaccine (2 - PCV) 10/01/2018    COVID-19 Vaccine (4 - Pfizer series) 05/17/2022    Flu Vaccine (1) 09/01/2023       Next Steps:   Patient has upcoming appointment, these items will be addressed at that time.    Type of outreach:    Sent [employer] message.    Next Steps:  Reach out within 90 days via [employer].    Max number of attempts reached: No. Will try again in 90 days if patient still on fail list.    Questions for provider review:    None           Aleena Goldstein MA  Chart routed to Care Team.

## 2023-10-11 ENCOUNTER — OFFICE VISIT (OUTPATIENT)
Dept: FAMILY MEDICINE | Facility: CLINIC | Age: 28
End: 2023-10-11
Payer: COMMERCIAL

## 2023-10-11 VITALS
RESPIRATION RATE: 15 BRPM | HEIGHT: 64 IN | SYSTOLIC BLOOD PRESSURE: 119 MMHG | BODY MASS INDEX: 29.04 KG/M2 | HEART RATE: 98 BPM | WEIGHT: 170.1 LBS | OXYGEN SATURATION: 99 % | TEMPERATURE: 98.9 F | DIASTOLIC BLOOD PRESSURE: 83 MMHG

## 2023-10-11 DIAGNOSIS — E10.65 TYPE 1 DIABETES MELLITUS WITH HYPERGLYCEMIA (H): Primary | ICD-10-CM

## 2023-10-11 DIAGNOSIS — Z30.41 ENCOUNTER FOR SURVEILLANCE OF CONTRACEPTIVE PILLS: ICD-10-CM

## 2023-10-11 DIAGNOSIS — E10.9 TYPE 1 DIABETES MELLITUS WITHOUT COMPLICATION (H): ICD-10-CM

## 2023-10-11 LAB
ANION GAP SERPL CALCULATED.3IONS-SCNC: 10 MMOL/L (ref 7–15)
BUN SERPL-MCNC: 16.4 MG/DL (ref 6–20)
CALCIUM SERPL-MCNC: 9.3 MG/DL (ref 8.6–10)
CHLORIDE SERPL-SCNC: 103 MMOL/L (ref 98–107)
CREAT SERPL-MCNC: 0.78 MG/DL (ref 0.51–0.95)
DEPRECATED HCO3 PLAS-SCNC: 26 MMOL/L (ref 22–29)
EGFRCR SERPLBLD CKD-EPI 2021: >90 ML/MIN/1.73M2
GLUCOSE SERPL-MCNC: 136 MG/DL (ref 70–99)
HBA1C MFR BLD: 8.1 % (ref 0–5.6)
POTASSIUM SERPL-SCNC: 4 MMOL/L (ref 3.4–5.3)
SODIUM SERPL-SCNC: 139 MMOL/L (ref 135–145)
TSH SERPL DL<=0.005 MIU/L-ACNC: 0.78 UIU/ML (ref 0.3–4.2)

## 2023-10-11 PROCEDURE — 84443 ASSAY THYROID STIM HORMONE: CPT | Performed by: NURSE PRACTITIONER

## 2023-10-11 PROCEDURE — 80048 BASIC METABOLIC PNL TOTAL CA: CPT | Performed by: NURSE PRACTITIONER

## 2023-10-11 PROCEDURE — 83036 HEMOGLOBIN GLYCOSYLATED A1C: CPT | Performed by: NURSE PRACTITIONER

## 2023-10-11 PROCEDURE — 90686 IIV4 VACC NO PRSV 0.5 ML IM: CPT | Performed by: NURSE PRACTITIONER

## 2023-10-11 PROCEDURE — 36415 COLL VENOUS BLD VENIPUNCTURE: CPT | Performed by: NURSE PRACTITIONER

## 2023-10-11 PROCEDURE — 90471 IMMUNIZATION ADMIN: CPT | Performed by: NURSE PRACTITIONER

## 2023-10-11 PROCEDURE — 99215 OFFICE O/P EST HI 40 MIN: CPT | Mod: 25 | Performed by: NURSE PRACTITIONER

## 2023-10-11 RX ORDER — INSULIN ASPART 100 [IU]/ML
INJECTION, SOLUTION INTRAVENOUS; SUBCUTANEOUS
Qty: 30 ML | Refills: 3 | Status: SHIPPED | OUTPATIENT
Start: 2023-10-11

## 2023-10-11 RX ORDER — INSULIN GLARGINE-YFGN 100 [IU]/ML
INJECTION, SOLUTION SUBCUTANEOUS
COMMUNITY
Start: 2023-07-30 | End: 2023-10-11

## 2023-10-11 RX ORDER — LEVONORGESTREL/ETHIN.ESTRADIOL 0.1-0.02MG
1 TABLET ORAL DAILY
Qty: 84 TABLET | Refills: 4 | Status: SHIPPED | OUTPATIENT
Start: 2023-10-11 | End: 2024-03-18

## 2023-10-11 RX ORDER — BLOOD-GLUCOSE SENSOR
1 EACH MISCELLANEOUS DAILY
Qty: 1 EACH | Refills: 0 | Status: SHIPPED | OUTPATIENT
Start: 2023-10-11 | End: 2023-12-29

## 2023-10-11 RX ORDER — INSULIN GLARGINE-YFGN 100 [IU]/ML
23 INJECTION, SOLUTION SUBCUTANEOUS DAILY
Qty: 21 ML | Refills: 1 | Status: SHIPPED | OUTPATIENT
Start: 2023-10-11 | End: 2024-01-09

## 2023-10-11 ASSESSMENT — PAIN SCALES - GENERAL: PAINLEVEL: NO PAIN (0)

## 2023-10-11 NOTE — PROGRESS NOTES
Assessment & Plan     Type 1 diabetes mellitus with hyperglycemia (H)  A1C improved from 6 months ago, but glucoses at home are variable. Discussed CGM and patient is interested in this. I do think increase in the long-acting insulin from 21 units to 23 units is reasonable. CGM data may support increased pre-meal insulin or sliding scale  Also checking TSH as this was last checked 2020 and no clear reason for BS variability in terms of diet and activity.  - Continuous Blood Gluc Sensor (FREESTYLE BIGG 3 SENSOR) MISC; 1 each daily  - Med Therapy Management Referral  - Hemoglobin A1c  - Basic metabolic panel  (Ca, Cl, CO2, Creat, Gluc, K, Na, BUN)  - TSH with free T4 reflex    Encounter for surveillance of contraceptive pills  Refilled for full year  No conception plans for at least one year  Will need endocrinology when pregnant  - levonorgestrel-ethinyl estradiol (AVIANE) 0.1-20 MG-MCG tablet; Take 1 tablet by mouth daily    Type 1 diabetes mellitus without complication (H)  - Insulin Glargine-yfgn (SEMGLEE, YFGN,) 100 UNIT/ML SOPN; Inject 23 Units Subcutaneous daily for 90 days  - insulin pen needle (32G X 4 MM) 32G X 4 MM miscellaneous; Use 5 pen needles daily or as directed.  - NOVOLOG FLEXPEN 100 UNIT/ML soln; Use 1 unit for 7 grams up to total of 30 units per day    Ordering of each unique test  Prescription drug management  I spent a total of 41 minutes on the day of the visit.   Time spent by me doing chart review, history and exam, documentation and further activities per the note      VELASQUEZ Adorno CNP  [facility]    Mel Colon is a 28 year old, presenting for the following health issues:  Diabetes        10/11/2023     9:40 AM   Additional Questions   Roomed by Zheng Canela       History of Present Illness       Diabetes:   She presents for follow up of diabetes.  She is checking home blood glucose three times daily.   She checks blood glucose before meals  and at bedtime.  Blood glucose is sometimes over 200 and never under 70. She is aware of hypoglycemia symptoms including shakiness and weakness.    She has no concerns regarding her diabetes at this time.   She is not experiencing numbness or burning in feet, excessive thirst, blurry vision, weight changes or redness, sores or blisters on feet. The patient has had a diabetic eye exam in the last 12 months. Eye exam performed on 03/27/23. Location of last eye exam [facility].        She eats 4 or more servings of fruits and vegetables daily.She consumes 0 sweetened beverage(s) daily.She exercises with enough effort to increase her heart rate 20 to 29 minutes per day.  She exercises with enough effort to increase her heart rate 4 days per week. She is missing 7 dose(s) of medications per week.  She is not taking prescribed medications regularly due to other.     Got  three weeks ago!    Switched LA insulin types prior to visit in March and felt this was causing some increases in glucoses adjusting to new brand. At that time, deferred changes while adjusting. Had also started cooking from scratch and trying to figure out carbs.  Had been on a good track when eating same meals consistently. Went to [place] and had a lot of lows because meals had less sugar. Since returning has had harder time controlling.    Hasn't had a pump and not interested, but has been thinking about a CGM.     Has been running high lately without any clear cause. Fasting range low 90's on low ends and 190-200 on high end. One glucose of 40 that was unexplained. Pre-prandial glucoses 120-170. Hasn't checked after meals.    Currently taking 21 units LA  Meal 1 unit per 7 carbs  Sliding scale 1 unit for every 40 over 150 pre-prandial    Wt Readings from Last 5 Encounters:   10/11/23 77.2 kg (170 lb 1.6 oz)   03/21/23 76.7 kg (169 lb)   03/22/22 76.9 kg (169 lb 8 oz)   12/10/20 73.9 kg (163 lb)   01/24/20 73.2 kg (161 lb  "6.4 oz)       Review of Systems   Constitutional, HEENT, cardiovascular, pulmonary, gi and gu systems are negative, except as otherwise noted.      Objective    /83 (BP Location: Right arm, Patient Position: Sitting, Cuff Size: Adult Regular)   Pulse 98   Temp 98.9  F (37.2  C) (Temporal)   Resp 15   Ht 1.626 m (5' 4\")   Wt 77.2 kg (170 lb 1.6 oz)   LMP 09/16/2023   SpO2 99%   BMI 29.20 kg/m    Body mass index is 29.2 kg/m .  Physical Exam   GENERAL: healthy, alert and no distress  HENT: ear canals and TM's normal, nose and mouth without ulcers or lesions  NECK: no adenopathy, no asymmetry, masses, or scars and thyroid normal to palpation  RESP: lungs clear to auscultation - no rales, rhonchi or wheezes  CV: regular rate and rhythm, normal S1 S2, no S3 or S4, no murmur, click or rub, no peripheral edema and peripheral pulses strong  PSYCH: mentation appears normal, affect normal/bright    Results for orders placed or performed in visit on 10/11/23 (from the past 24 hour(s))   Hemoglobin A1c   Result Value Ref Range    Hemoglobin A1C 8.1 (H) 0.0 - 5.6 %                 "

## 2023-10-13 ENCOUNTER — TELEPHONE (OUTPATIENT)
Dept: FAMILY MEDICINE | Facility: CLINIC | Age: 28
End: 2023-10-13
Payer: COMMERCIAL

## 2023-10-13 NOTE — TELEPHONE ENCOUNTER
MTM referral from: [facility] visit (referral by provider)    MTM referral outreach attempt #2 on October 13, 2023 at 12:38 PM      Outcome: Patient not reachable after several attempts, will route to [facility] Pharmacist/Provider as an FYI.  [facility] scheduling number is [phone].  Thank you for the referral.    Use [employer] for the carrier/Plan on the flowsheet      [employer] Message Sent    Kelly Funes CPhT  [facility]

## 2023-10-18 NOTE — RESULT ENCOUNTER NOTE
Isaiah,    A1C was actually not bad and was better than previously. Did the continuous glucose monitor get covered?      Thyroid was normal - so no explanation there for the variable sugars.     If you have any questions, please feel free to contact the clinic.    JESSY Cates

## 2023-12-29 DIAGNOSIS — E10.65 TYPE 1 DIABETES MELLITUS WITH HYPERGLYCEMIA (H): ICD-10-CM

## 2023-12-29 RX ORDER — BLOOD-GLUCOSE SENSOR
1 EACH MISCELLANEOUS DAILY
Qty: 1 EACH | Refills: 0 | Status: SHIPPED | OUTPATIENT
Start: 2023-12-29 | End: 2024-01-30

## 2024-01-22 NOTE — PROGRESS NOTES
Outcome for 01/22/24 9:55 AM: [employer] message sent  Dary Lopez LPN   Outcome for 01/26/24 2:38 PM: Data uploaded on [employer]  Evelin Valdovinos MA  Outcome for 01/29/24 7:36 AM: Data obtained via [employer] website  Dary Lopez LPN           This 28-year-old woman seen for f/u of her type 1 diabetes.  she was diagnosed in 2010.   She was prescribed a perla cgm by her PCP in the fall.  She started wearing it a couple of weeks ago.  Overall she has been very impressed with the data.  She is learned that her sugars are much better if she boluses 15 minutes before she eats.  She is appreciated seeing the impact of different foods on her sugars.  She continues to manage her diabetes with injections.  She takes glargine 22 units at 8 PM each night and takes mealtime insulin based on experience.  She has been working on carb counting but does not consistently do that.  She did have some hypoglycemia when she first started wearing the CGM.  She reports she was more aggressive with correction than she should have been.  She recognized these lows without difficulty.  She has not had an episode of severe hypoglycemia.  She is not doing any regular exercise other than walking.  She has not had to adjust her insulin dosing for activity.    She is due to see her eye doctor in March.  She does not have a history of retinopathy.  She denies chest pain or shortness of breath.  She has no GI or  symptoms.  She is taking birth control pills and has a very short menstrual cycle at regular times.  She and her  are are thinking of having a baby in the future.  She is not thinking about getting pregnant for another year or so.  She knows she needs to get her blood sugars under better control.                      Current Outpatient Medications   Medication    blood glucose (NO BRAND SPECIFIED) lancets standard    blood glucose (NO BRAND SPECIFIED) test strip    Continuous Blood Gluc Sensor (FREESTYLE PERLA 3 SENSOR) MISC    Glucagon  "(BAQSIMI ONE PACK) 3 MG/DOSE POWD    insulin pen needle (32G X 4 MM) 32G X 4 MM miscellaneous    levonorgestrel-ethinyl estradiol (AVIANE) 0.1-20 MG-MCG tablet    NOVOLOG FLEXPEN 100 UNIT/ML soln    SEMGLEE, YFGN, 100 UNIT/ML SOPN     No current facility-administered medications for this visit.     She is  and works in [employer].  She sometimes travels for work.             10/11/2023  0942 Most Recent Value      Temp: 98.9  F (37.2  C) 98.9  F (37.2  C)  as of 10/11/2023     Pulse: 98 98  as of 10/11/2023     BP: 119/83 119/83  as of 10/11/2023     Resp: 15 15  as of 10/11/2023     SpO2: 99 % 99%  as of 10/11/2023     Body Mass Index:  None     Systolic (Patient Repo...: -- Not taken     Diastolic (Patient Rep...: -- Not taken     Height: 1.626 m (5' 4\")  1.626 m (5' 4\")   as of 10/11/2023      pt reported            Office Visit on 10/11/2023   Component Date Value Ref Range Status    Hemoglobin A1C 10/11/2023 8.1 (H)  0.0 - 5.6 % Final    Normal <5.7%   Prediabetes 5.7-6.4%    Diabetes 6.5% or higher     Note: Adopted from ADA consensus guidelines.    Sodium 10/11/2023 139  135 - 145 mmol/L Final    Reference intervals for this test were updated on 09/26/2023 to more accurately reflect our healthy population. There may be differences in the flagging of prior results with similar values performed with this method. Interpretation of those prior results can be made in the context of the updated reference intervals.     Potassium 10/11/2023 4.0  3.4 - 5.3 mmol/L Final    Chloride 10/11/2023 103  98 - 107 mmol/L Final    Carbon Dioxide (CO2) 10/11/2023 26  22 - 29 mmol/L Final    Anion Gap 10/11/2023 10  7 - 15 mmol/L Final    Urea Nitrogen 10/11/2023 16.4  6.0 - 20.0 mg/dL Final    Creatinine 10/11/2023 0.78  0.51 - 0.95 mg/dL Final    GFR Estimate 10/11/2023 >90  >60 mL/min/1.73m2 Final    Calcium 10/11/2023 9.3  8.6 - 10.0 mg/dL Final    Glucose 10/11/2023 136 (H)  70 - 99 mg/dL Final    TSH 10/11/2023 " 0.78  0.30 - 4.20 uIU/mL Final         Recent Labs   Lab Test 10/11/23  1055 03/21/23  1214 03/21/23  1123 03/22/22  1026 12/10/20  1000 12/10/20  0953 12/10/20  0939   A1C 8.1*  --  8.4*  --    < > 8.0*  --    TSH 0.78  --   --   --   --   --  0.68   LDL  --   --   --   --   --  79  --    HDL  --   --   --   --   --  58  --    TRIG  --   --   --   --   --  90  --    CR 0.78  --  0.83  --    < >  --  0.78   MICROL  --  <12.0  --  <5   < >  --   --     < > = values in this interval not displayed.       Assessment and plan:    1.  Diabetes control.  The A1c collected last fall is too high.  She thinks she has benefited from using the perla 3 so I will renew that prescription for her.  She is learned a lot about how to better give her insulin to better control her postprandial glucoses.  I recommended she continue at her current doses and try to bolus 15 minutes before she eats.  I also talked about the hybrid closed-loop pumps and recommended she might think about using them if she really enjoys the CGM.    2.  Diabetes complications.  She is due to see her eye doctor in March.  She has no retinopathy by history.  I will check her urine albumin.  Her recent creatinine was normal.  She has no foot concerns.    3.preconception counseling.  She is considering a pregnancy next year.  I stressed the importance of having an A1c less than 7 and in trying to get her sugars to that level before she stopped taking birth control pills.    4.CVD risk.  Blood pressure has been okay in the past.  Her LDL is less than 100 but that has not been checked for a while.  Given her plan for pregnancy, we would not want her to start a statin even if her LDL was high at this time.    I spent 20 minutes on the video visit with the patient (start time 2: 00 and end time 2: 2 0).  I did the visit from my office away from clinic.  On the same day of visit I spent an additional 10 minutes reviewing her CGM data, placing orders, and doing  documentation.    Lola Butler MD

## 2024-01-26 ENCOUNTER — TELEPHONE (OUTPATIENT)
Dept: ENDOCRINOLOGY | Facility: CLINIC | Age: 29
End: 2024-01-26
Payer: COMMERCIAL

## 2024-01-26 NOTE — TELEPHONE ENCOUNTER
Spoke with patient. Pt is using Inot[employer] sacha. Linked clinic to sacha. Pt states she has 2 more days with sensor. Will pull report Monday prior to appt. Evelin Valdovinos CMA on 1/26/2024 at 2:42 PM

## 2024-01-30 ENCOUNTER — VIRTUAL VISIT (OUTPATIENT)
Dept: ENDOCRINOLOGY | Facility: CLINIC | Age: 29
End: 2024-01-30
Payer: COMMERCIAL

## 2024-01-30 VITALS — BODY MASS INDEX: 27.31 KG/M2 | WEIGHT: 160 LBS | HEIGHT: 64 IN

## 2024-01-30 DIAGNOSIS — E10.65 TYPE 1 DIABETES MELLITUS WITH HYPERGLYCEMIA (H): ICD-10-CM

## 2024-01-30 DIAGNOSIS — E10.9 TYPE 1 DIABETES MELLITUS WITHOUT COMPLICATION (H): Primary | ICD-10-CM

## 2024-01-30 PROCEDURE — G2211 COMPLEX E/M VISIT ADD ON: HCPCS | Mod: 95 | Performed by: INTERNAL MEDICINE

## 2024-01-30 PROCEDURE — 99214 OFFICE O/P EST MOD 30 MIN: CPT | Mod: 95 | Performed by: INTERNAL MEDICINE

## 2024-01-30 RX ORDER — INSULIN GLARGINE-YFGN 100 [IU]/ML
INJECTION, SOLUTION SUBCUTANEOUS
COMMUNITY
Start: 2024-01-17 | End: 2024-04-16

## 2024-01-30 RX ORDER — BLOOD-GLUCOSE SENSOR
1 EACH MISCELLANEOUS
Qty: 6 EACH | Refills: 3 | Status: SHIPPED | OUTPATIENT
Start: 2024-01-30 | End: 2024-03-18

## 2024-01-30 ASSESSMENT — PAIN SCALES - GENERAL: PAINLEVEL: NO PAIN (0)

## 2024-01-30 NOTE — PROGRESS NOTES
"Virtual Visit Details    Type of service:  Video Visit     Originating Location (pt. Location): {video visit patient location:112683::\"Home\"}  {PROVIDER LOCATION On-site should be selected for visits conducted from your clinic location or adjoining [facility] hospital, academic office, or other nearby [facility] building. Off-site should be selected for all other provider locations, including home:[id number]}  Distant Location (provider location):  {virtual location provider:835487}  Platform used for Video Visit: {Virtual Visit Platforms:973504::\"Virtual City\"}    "

## 2024-01-30 NOTE — NURSING NOTE
Is the patient currently in the state of [place]? YES    Visit mode:VIDEO    If the visit is dropped, the patient can be reconnected by: VIDEO VISIT: Text to cell phone:   Telephone Information:   Mobile [phone]       Will anyone else be joining the visit? NO  (If patient encounters technical issues they should call [phone])    How would you like to obtain your AVS? MyChart    Are changes needed to the allergy or medication list? No    Reason for visit: RECHECK    Harvinder HOUSTON

## 2024-01-30 NOTE — PATIENT INSTRUCTIONS
Do not forget to come to the lab to do the urine test.  I will ask them to do an A1c at the same time.  Please contact us to schedule at any of our [place] lab locations  Call [phone] ([phone]), select option 1 or via [employer]

## 2024-01-30 NOTE — LETTER
1/30/2024       RE: Isaiah Barnes  [address] [place] [address] [place] [address]     Dear Colleague,    Thank you for referring your patient, Isaiah Barnes, to the [facility]. Please see a copy of my visit note below.    Outcome for 01/22/24 9:55 AM: [employer] message sent  Dary Lopez LPN   Outcome for 01/26/24 2:38 PM: Data uploaded on [email or web address]  Evelin Valdovinos MA  Outcome for 01/29/24 7:36 AM: Data obtained via [email or web address] website  Dary Lopez LPN           This 28-year-old woman seen for f/u of her type 1 diabetes.  she was diagnosed in 2010.   She was prescribed a perla cgm by her PCP in the fall.  She started wearing it a couple of weeks ago.  Overall she has been very impressed with the data.  She is learned that her sugars are much better if she boluses 15 minutes before she eats.  She is appreciated seeing the impact of different foods on her sugars.  She continues to manage her diabetes with injections.  She takes glargine 22 units at 8 PM each night and takes mealtime insulin based on experience.  She has been working on carb counting but does not consistently do that.  She did have some hypoglycemia when she first started wearing the CGM.  She reports she was more aggressive with correction than she should have been.  She recognized these lows without difficulty.  She has not had an episode of severe hypoglycemia.  She is not doing any regular exercise other than walking.  She has not had to adjust her insulin dosing for activity.    She is due to see her eye doctor in March.  She does not have a history of retinopathy.  She denies chest pain or shortness of breath.  She has no GI or  symptoms.  She is taking birth control pills and has a very short menstrual cycle at regular times.  She and her  are are thinking of having a baby in the future.  She is not thinking about getting pregnant for another  "year or so.  She knows she needs to get her blood sugars under better control.                      Current Outpatient Medications   Medication    blood glucose (NO BRAND SPECIFIED) lancets standard    blood glucose (NO BRAND SPECIFIED) test strip    Continuous Blood Gluc Sensor (FREESTYLE BIGG 3 SENSOR) MISC    Glucagon (BAQSIMI ONE PACK) 3 MG/DOSE POWD    insulin pen needle (32G X 4 MM) 32G X 4 MM miscellaneous    levonorgestrel-ethinyl estradiol (AVIANE) 0.1-20 MG-MCG tablet    NOVOLOG FLEXPEN 100 UNIT/ML soln    SEMGLEE, YFGN, 100 UNIT/ML SOPN     No current facility-administered medications for this visit.     She is  and works in [employer].  She sometimes travels for work.             10/11/2023  0942 Most Recent Value      Temp: 98.9  F (37.2  C) 98.9  F (37.2  C)  as of 10/11/2023     Pulse: 98 98  as of 10/11/2023     BP: 119/83 119/83  as of 10/11/2023     Resp: 15 15  as of 10/11/2023     SpO2: 99 % 99%  as of 10/11/2023     Body Mass Index:  None     Systolic (Patient Repo...: -- Not taken     Diastolic (Patient Rep...: -- Not taken     Height: 1.626 m (5' 4\")  1.626 m (5' 4\")   as of 10/11/2023      pt reported            Office Visit on 10/11/2023   Component Date Value Ref Range Status    Hemoglobin A1C 10/11/2023 8.1 (H)  0.0 - 5.6 % Final    Normal <5.7%   Prediabetes 5.7-6.4%    Diabetes 6.5% or higher     Note: Adopted from ADA consensus guidelines.    Sodium 10/11/2023 139  135 - 145 mmol/L Final    Reference intervals for this test were updated on 09/26/2023 to more accurately reflect our healthy population. There may be differences in the flagging of prior results with similar values performed with this method. Interpretation of those prior results can be made in the context of the updated reference intervals.     Potassium 10/11/2023 4.0  3.4 - 5.3 mmol/L Final    Chloride 10/11/2023 103  98 - 107 mmol/L Final    Carbon Dioxide (CO2) 10/11/2023 26  22 - 29 mmol/L Final    Anion Gap " 10/11/2023 10  7 - 15 mmol/L Final    Urea Nitrogen 10/11/2023 16.4  6.0 - 20.0 mg/dL Final    Creatinine 10/11/2023 0.78  0.51 - 0.95 mg/dL Final    GFR Estimate 10/11/2023 >90  >60 mL/min/1.73m2 Final    Calcium 10/11/2023 9.3  8.6 - 10.0 mg/dL Final    Glucose 10/11/2023 136 (H)  70 - 99 mg/dL Final    TSH 10/11/2023 0.78  0.30 - 4.20 uIU/mL Final         Recent Labs   Lab Test 10/11/23  1055 03/21/23  1214 03/21/23  1123 03/22/22  1026 12/10/20  1000 12/10/20  0953 12/10/20  0939   A1C 8.1*  --  8.4*  --    < > 8.0*  --    TSH 0.78  --   --   --   --   --  0.68   LDL  --   --   --   --   --  79  --    HDL  --   --   --   --   --  58  --    TRIG  --   --   --   --   --  90  --    CR 0.78  --  0.83  --    < >  --  0.78   MICROL  --  <12.0  --  <5   < >  --   --     < > = values in this interval not displayed.       Assessment and plan:    1.  Diabetes control.  The A1c collected last fall is too high.  She thinks she has benefited from using the perla 3 so I will renew that prescription for her.  She is learned a lot about how to better give her insulin to better control her postprandial glucoses.  I recommended she continue at her current doses and try to bolus 15 minutes before she eats.  I also talked about the hybrid closed-loop pumps and recommended she might think about using them if she really enjoys the CGM.    2.  Diabetes complications.  She is due to see her eye doctor in March.  She has no retinopathy by history.  I will check her urine albumin.  Her recent creatinine was normal.  She has no foot concerns.    3.preconception counseling.  She is considering a pregnancy next year.  I stressed the importance of having an A1c less than 7 and in trying to get her sugars to that level before she stopped taking birth control pills.    4.CVD risk.  Blood pressure has been okay in the past.  Her LDL is less than 100 but that has not been checked for a while.  Given her plan for pregnancy, we would not want her  to start a statin even if her LDL was high at this time.    I spent 20 minutes on the video visit with the patient (start time 2: 00 and end time 2: 2 0).  I did the visit from my office away from clinic.  On the same day of visit I spent an additional 10 minutes reviewing her CGM data, placing orders, and doing documentation.    Lola Butler MD

## 2024-01-31 ENCOUNTER — TELEPHONE (OUTPATIENT)
Dept: ENDOCRINOLOGY | Facility: CLINIC | Age: 29
End: 2024-01-31
Payer: COMMERCIAL

## 2024-01-31 NOTE — TELEPHONE ENCOUNTER
1st attempt- LVM and sent mychart    Schedule 1/30 checkout order:    - 6 mo follow-up appointment with Dr. Butler or JAYCE -whoever has availability (around 7/31/24).   **Per checkout order, if scheduling with JAYCE, schedule additional follow-up appointment with Dr. Butler in 9 mo (around 10/30/24)

## 2024-02-05 ENCOUNTER — MYC MEDICAL ADVICE (OUTPATIENT)
Dept: ENDOCRINOLOGY | Facility: CLINIC | Age: 29
End: 2024-02-05
Payer: COMMERCIAL

## 2024-02-05 NOTE — TELEPHONE ENCOUNTER
Left Voicemail (1st Attempt) and Sent Mychart (1st Attempt) for the patient to call back and schedule the following:    Appointment type: Return endo  Provider: Luke  Return date: 6 months (around 7/30/24)  Specialty phone number: [phone]  Additional appointment(s) needed: NA  Additonal Notes: With me or an JAYCE, whoever has an opening. If it is with JAYCE schedule appt in 9 mo with me. Next visit can be virtual or in clinic, patient preference

## 2024-02-17 ENCOUNTER — HEALTH MAINTENANCE LETTER (OUTPATIENT)
Age: 29
End: 2024-02-17

## 2024-03-17 SDOH — HEALTH STABILITY: PHYSICAL HEALTH: ON AVERAGE, HOW MANY MINUTES DO YOU ENGAGE IN EXERCISE AT THIS LEVEL?: 30 MIN

## 2024-03-17 SDOH — HEALTH STABILITY: PHYSICAL HEALTH: ON AVERAGE, HOW MANY DAYS PER WEEK DO YOU ENGAGE IN MODERATE TO STRENUOUS EXERCISE (LIKE A BRISK WALK)?: 3 DAYS

## 2024-03-17 ASSESSMENT — SOCIAL DETERMINANTS OF HEALTH (SDOH): HOW OFTEN DO YOU GET TOGETHER WITH FRIENDS OR RELATIVES?: ONCE A WEEK

## 2024-03-18 ENCOUNTER — OFFICE VISIT (OUTPATIENT)
Dept: FAMILY MEDICINE | Facility: CLINIC | Age: 29
End: 2024-03-18
Payer: COMMERCIAL

## 2024-03-18 VITALS
HEIGHT: 64 IN | SYSTOLIC BLOOD PRESSURE: 104 MMHG | DIASTOLIC BLOOD PRESSURE: 70 MMHG | TEMPERATURE: 98.3 F | RESPIRATION RATE: 15 BRPM | OXYGEN SATURATION: 97 % | HEART RATE: 75 BPM | BODY MASS INDEX: 29.79 KG/M2 | WEIGHT: 174.5 LBS

## 2024-03-18 DIAGNOSIS — Z30.41 ENCOUNTER FOR SURVEILLANCE OF CONTRACEPTIVE PILLS: ICD-10-CM

## 2024-03-18 DIAGNOSIS — Z00.00 ROUTINE GENERAL MEDICAL EXAMINATION AT A HEALTH CARE FACILITY: Primary | ICD-10-CM

## 2024-03-18 DIAGNOSIS — E10.65 TYPE 1 DIABETES MELLITUS WITH HYPERGLYCEMIA (H): ICD-10-CM

## 2024-03-18 DIAGNOSIS — E10.9 TYPE 1 DIABETES MELLITUS WITHOUT COMPLICATION (H): ICD-10-CM

## 2024-03-18 LAB
ANION GAP SERPL CALCULATED.3IONS-SCNC: 10 MMOL/L (ref 7–15)
BUN SERPL-MCNC: 11.9 MG/DL (ref 6–20)
CALCIUM SERPL-MCNC: 9.4 MG/DL (ref 8.6–10)
CHLORIDE SERPL-SCNC: 104 MMOL/L (ref 98–107)
CREAT SERPL-MCNC: 0.83 MG/DL (ref 0.51–0.95)
DEPRECATED HCO3 PLAS-SCNC: 25 MMOL/L (ref 22–29)
EGFRCR SERPLBLD CKD-EPI 2021: >90 ML/MIN/1.73M2
GLUCOSE SERPL-MCNC: 55 MG/DL (ref 70–99)
HBA1C MFR BLD: 7.6 % (ref 0–5.6)
POTASSIUM SERPL-SCNC: 3.8 MMOL/L (ref 3.4–5.3)
SODIUM SERPL-SCNC: 139 MMOL/L (ref 135–145)

## 2024-03-18 PROCEDURE — 90471 IMMUNIZATION ADMIN: CPT | Performed by: NURSE PRACTITIONER

## 2024-03-18 PROCEDURE — 99213 OFFICE O/P EST LOW 20 MIN: CPT | Mod: 25 | Performed by: NURSE PRACTITIONER

## 2024-03-18 PROCEDURE — 90677 PCV20 VACCINE IM: CPT | Performed by: NURSE PRACTITIONER

## 2024-03-18 PROCEDURE — 80048 BASIC METABOLIC PNL TOTAL CA: CPT | Performed by: NURSE PRACTITIONER

## 2024-03-18 PROCEDURE — 99395 PREV VISIT EST AGE 18-39: CPT | Mod: 25 | Performed by: NURSE PRACTITIONER

## 2024-03-18 PROCEDURE — 83036 HEMOGLOBIN GLYCOSYLATED A1C: CPT | Performed by: NURSE PRACTITIONER

## 2024-03-18 PROCEDURE — 36415 COLL VENOUS BLD VENIPUNCTURE: CPT | Performed by: NURSE PRACTITIONER

## 2024-03-18 RX ORDER — LEVONORGESTREL/ETHIN.ESTRADIOL 0.1-0.02MG
1 TABLET ORAL DAILY
Qty: 84 TABLET | Refills: 4 | Status: SHIPPED | OUTPATIENT
Start: 2024-03-18

## 2024-03-18 RX ORDER — BLOOD-GLUCOSE SENSOR
1 EACH MISCELLANEOUS
Qty: 6 EACH | Refills: 3 | Status: SHIPPED | OUTPATIENT
Start: 2024-03-18

## 2024-03-18 ASSESSMENT — PAIN SCALES - GENERAL: PAINLEVEL: NO PAIN (0)

## 2024-03-18 NOTE — PROGRESS NOTES
"Preventive Care Visit  [facility] PRIMARY CARE  VELASQUEZ Adorno CNP, Nurse Practitioner - Family  Mar 18, 2024    [email or web address]     Assessment & Plan     Routine general medical examination at a health care facility  Declined covid-19 and hepatits B vaccines at today's visit. Received pneumococcal vaccine.     Type 1 diabetes mellitus with hyperglycemia (H)  Plan to work with pharmacist to pull Heriberto report- Once more data is obtained, can adjust insulin based on patterns. Check labs today, sensors refilled. Reviewed Heriberto data and most of lower sugars are overnight. None lower than 70. Advised to split LA insulin and patient verbalized understanding. Contact us if lows' don't resolve. Plan endo follow-up 6 months.  - Hemoglobin A1c  - Basic metabolic panel  (Ca, Cl, CO2, Creat, Gluc, K, Na, BUN)  - Albumin Random Urine Quantitative with Creat Ratio  - Continuous Blood Gluc Sensor (FREESTYLE HERIBERTO 3 SENSOR) MISC  Dispense: 6 each; Refill: 3    Encounter for surveillance of contraceptive pills  No concerns. Pt to discuss family planning with endocrinology if she decides to consider becoming pregnant.   - levonorgestrel-ethinyl estradiol (AVIANE) 0.1-20 MG-MCG tablet  Dispense: 84 tablet; Refill: 4      Patient has been advised of split billing requirements and indicates understanding: Yes  Ordering of each unique test  Prescription drug management      BMI  Estimated body mass index is 29.95 kg/m  as calculated from the following:    Height as of this encounter: 1.626 m (5' 4\").    Weight as of this encounter: 79.2 kg (174 lb 8 oz).   Weight management plan: Discussed healthy diet and exercise guidelines    Counseling  Appropriate preventive services were discussed with this patient, including applicable screening as appropriate for fall prevention, nutrition, physical activity, Tobacco-use cessation, weight loss and cognition.  Checklist reviewing preventive services " available has been given to the patient.  Reviewed patient's diet, addressing concerns and/or questions.   She is at risk for lack of exercise and has been provided with information to increase physical activity for the benefit of her well-being.   She is at risk for psychosocial distress and has been provided with information to reduce risk.       Mel Colon is a 28 year old, presenting for the following:  Physical        3/18/2024     1:00 PM   Additional Questions   Roomed by Dolly CHINCHILLA        Health Care Directive  Patient does not have a Health Care Directive or Living Will: Discussed advance care planning with patient; information given to patient to review.    Pt is 29 yo F with DM1 presenting for annual wellness and diabetes check.     Bought a house, renovated with  and parents. Moves into home next weekend. Switching jobs, works in advertising, current job ends next week and new job starts new job April 8th.       Has been using perla 3 for the last 2 months, this is going well.     Takes 1 unit for every 7 grams carbs. Takes 22 U Semglee at night.     Has been low more recently than normal. Having lows no more than once per week. Lowest number has been mid 50s. Having overnight lows- not very consistent. Increased Semglee from 21 to 22 U at last visit. Sees endocrinology once every 6 months. Highest numbers have been 250s. Typically knows why. Gets vision screens regularly- last checked a year ago, scheduling appointment for 2 weeks out.     Denies neuropathies, excessive thirst, hunger, urination.     Takes oral contraception- works well. Very rarely misses doses. Has been on for 10 years. No concerns/desire for for STI screening    Tobacco: none  Alcohol: occasionally- glass of wine/weeks  Drugs: none.     Lab Results   Component Value Date    A1C 8.1 10/11/2023    A1C 8.4 03/21/2023    A1C 7.8 03/22/2022    A1C 8.0 12/10/2020    A1C 9.1 01/24/2020         3/17/2024   General Health    How would you rate your overall physical health? Good   Feel stress (tense, anxious, or unable to sleep) Only a little   (!) STRESS CONCERN      3/17/2024   Nutrition   Three or more servings of calcium each day? Yes   Diet: Regular (no restrictions)   How many servings of fruit and vegetables per day? (!) 2-3   How many sweetened beverages each day? 0-1         3/17/2024   Exercise   Days per week of moderate/strenous exercise 3 days   Average minutes spent exercising at this level 30 min         3/17/2024   Social Factors   Frequency of gathering with friends or relatives Once a week   Worry food won't last until get money to buy more No   Food not last or not have enough money for food? No   Do you have housing?  Yes   Are you worried about losing your housing? No   Lack of transportation? No   Unable to get utilities (heat,electricity)? No         3/17/2024   Dental   Dentist two times every year? (!) DECLINE         3/17/2024   TB Screening   Were you born outside of the [place]? No         Today's PHQ-2 Score:       1/30/2024     1:59 PM   PHQ-2 ( 1999 Pfizer)   Q1: Little interest or pleasure in doing things 0   Q2: Feeling down, depressed or hopeless 0   PHQ-2 Score 0         3/17/2024   Substance Use   Alcohol more than 3/day or more than 7/wk No   Do you use any other substances recreationally? No     Social History     Tobacco Use    Smoking status: Never    Smokeless tobacco: Never   Vaping Use    Vaping Use: Never used   Substance Use Topics    Alcohol use: Yes    Drug use: Never         3/22/2022   LAST FHS-7 RESULTS   1st degree relative breast or ovarian cancer No   Any relative bilateral breast cancer No   Any male have breast cancer No   Any ONE woman have BOTH breast AND ovarian cancer No   Any woman with breast cancer before 50yrs No   2 or more relatives with breast AND/OR ovarian cancer No   2 or more relatives with breast AND/OR bowel cancer No        Mammogram Screening - Patient under 40 years  "of age: Routine Mammogram Screening not recommended.         3/17/2024   STI Screening   New sexual partner(s) since last STI/HIV test? No     History of abnormal Pap smear: NO - age 21-29 PAP every 3 years recommended        3/22/2022     9:00 AM   PAP / HPV   PAP Negative for Intraepithelial Lesion or Malignancy (NILM)            3/17/2024   Contraception/Family Planning   Questions about contraception or family planning No        Reviewed and updated as needed this visit by Provider                    No past medical history on file.  No past surgical history on file.  BP Readings from Last 3 Encounters:   03/18/24 104/70   10/11/23 119/83   03/21/23 118/85    Wt Readings from Last 3 Encounters:   03/18/24 79.2 kg (174 lb 8 oz)   01/30/24 72.6 kg (160 lb)   10/11/23 77.2 kg (170 lb 1.6 oz)                      Review of Systems  Constitutional, neuro, ENT, endocrine, pulmonary, cardiac, gastrointestinal, genitourinary, musculoskeletal, integument and psychiatric systems are negative, except as otherwise noted.     Objective    Exam  /70 (BP Location: Right arm, Patient Position: Sitting, Cuff Size: Adult Regular)   Pulse 75   Temp 98.3  F (36.8  C) (Temporal)   Resp 15   Ht 1.626 m (5' 4\")   Wt 79.2 kg (174 lb 8 oz)   LMP 03/02/2024 (Exact Date)   SpO2 97%   BMI 29.95 kg/m     Estimated body mass index is 29.95 kg/m  as calculated from the following:    Height as of this encounter: 1.626 m (5' 4\").    Weight as of this encounter: 79.2 kg (174 lb 8 oz).    Physical Exam  GENERAL: alert and no distress  EYES: Eyes grossly normal to inspection, PERRL and conjunctivae and sclerae normal  HENT: ear canals and TM's normal, nose and mouth without ulcers or lesions  NECK: no adenopathy, no asymmetry, masses, or scars  RESP: lungs clear to auscultation - no rales, rhonchi or wheezes  CV: regular rate and rhythm, normal S1 S2, no S3 or S4, no murmur, click or rub, no peripheral edema  ABDOMEN: soft, " nontender, no hepatosplenomegaly, no masses and bowel sounds normal  MS: no gross musculoskeletal defects noted, no edema  SKIN: no suspicious lesions or rashes  NEURO: Normal strength and tone, mentation intact and speech normal  PSYCH: mentation appears normal, affect normal/bright  Diabetic foot exam: normal DP and PT pulses, no trophic changes or ulcerative lesions, normal sensory exam, and normal monofilament exam    Exam and note completed by DNP student Rosie Vega with oversight from VELASQUEZ Adorno CNP      Signed Electronically by: VELASQUEZ Adorno CNP

## 2024-03-18 NOTE — PATIENT INSTRUCTIONS
"Pap next March with preventative    1.\"Eat food.  Not too much.  Mostly plants\" - [place] Pollen - see link below  - Aim for 5-7 servings of vegetables (raw vegetables - 1 serving = 1/2 cup; raw greens - 1 serving = 1 cup)   - Eat grains, but don't make them the superstar of your meal and DO make them whole grains  2. AVOID sugar.  There is no nutritional benefit to sugar.  3. Drink lots of water (avoid juice and soda)  4. MOVE your body daily - even if some days this means 5 minutes of movement. Walking is great for your bones and brain.  Do aim for 150 minutes per week of activity that raises your heart rate, but \"don't let the ideal get in the way of the good enough\"  5. Get good sleep (6-8 hours/night- less sleep is associated with disease/illness/obesity)   6. Smile and laugh every day - even in the face of tragedy  7. Start a meditation or mindfulness practice    CALCIUM: The average recommended intake for women is 2813-8084 mg calcium daily. It is best to get this from your diet (Milk, yogurt, and cheese, vegetables such as Chinese cabbage, kale, spinach and broccoli). If you are not eating enough calcium, then I recommend Calcium Citrate/vitD supplements daily.     Vitamin D is an essential nutritient that many [place] lack, especially in the winter. It is vital for healthy immune system functioning and can be linked to diseases such as obesity, diabetes, body aches/pain, etc. It is super safe and highly beneficial for a [place] to take a vitamin D3 2000 IU once daily during winter months. Daily vitamin D for life is recommended for anyone who has ever been found deficient.    Other things to consider: do not drive while under the influence of alcohol, do not drive while texting, always wear a seatbelt, wear a helmet when biking, wear sunscreen to help prevent skin cancer, use DEET mosquito spray when outdoors to prevent mosquito and tick bites, & avoid smoking. If you do get bit by a DEER tick, " "please contact my office immediately to consider lyme prophylaxis. Dental visits recommended every 6-12 months.    Dayton Samayoa's 7 Rules for Eating  https://www.Xention.Bildero/food-recipes/news/60792034/7-rules-for-eating#1    My typical clinic hours are as follows:  Monday 12-5 pm  Tuesday 8am-3pm  Wednesday 7:20am-1pm  Thursday virtual appts 8:20am-12:20 pm  Friday 8-11 am  If you need an appointment urgently and do not find one available on Qualys or with Central scheduling, please send me a Qualys message and I will work to get you scheduled with myself or a colleague here     Clinic notes  Lab and imaging results are now available for you to view immediately on completion.  Please know that I often have not seen the result before you see results.  I will interpret and discuss the results and meaning of the results as soon as I am able to review them.   Qualys is the best way to reach the clinic directly.  When you send a Qualys message this will be read by our clinic RNs.  Please know that when you call the clinic number, you are not speaking to a staff member from our local clinic.  You can ask that staff to speak to a clinic staff directly if you wish.  You will be able to read my documentation (\"provider notes\") when I finish up and close your chart.  I encourage you to read through to understand your diagnosis and the plan and how we arrived there.  It is also an opportunity to make sure I fully understood your concerns.    "

## 2024-03-19 ENCOUNTER — MYC MEDICAL ADVICE (OUTPATIENT)
Dept: FAMILY MEDICINE | Facility: CLINIC | Age: 29
End: 2024-03-19
Payer: COMMERCIAL

## 2024-03-19 DIAGNOSIS — E10.65 TYPE 1 DIABETES MELLITUS WITH HYPERGLYCEMIA (H): Primary | ICD-10-CM

## 2024-03-19 NOTE — RESULT ENCOUNTER NOTE
Isaiah,    Your kidney function looks good. A1C is 7.6, so improved, but glucose was pretty low at that spot check.  The pharmacist is going to show me how to log into the Heriberto website. Can you tell me the code we should use for you?  If you have any questions, please feel free to contact the clinic.    JESSY Cates

## 2024-03-20 ENCOUNTER — TELEPHONE (OUTPATIENT)
Dept: FAMILY MEDICINE | Facility: CLINIC | Age: 29
End: 2024-03-20
Payer: COMMERCIAL

## 2024-03-20 NOTE — TELEPHONE ENCOUNTER
"Could you call Charles with this message? She hasn't seen her Antavo message yet and so worried she won't see it.    \"Could you clarify something for me. You had told Rosie that when mealtime readings are high that you know it is because you got the timing wrong. Can you tell me what you mean? Is it that you are dosing the correction dose after the meal rather than below?     In looking at the Heriberto data showing most of the lows are overnight, and thinking about if that is what you mean by the timing, then I would suggest we split the long-acting insulin dose so that you are taking 12 units in the AM and 11 units in the PM.      What do you think about that idea?\"    Thanks!  "

## 2024-03-21 NOTE — TELEPHONE ENCOUNTER
Pt has read and responded to the My chart message. Thanks    Vivienne Birch RN  [facility]

## 2024-04-16 DIAGNOSIS — E10.65 TYPE 1 DIABETES MELLITUS WITH HYPERGLYCEMIA (H): Primary | ICD-10-CM

## 2024-04-16 RX ORDER — INSULIN GLARGINE-YFGN 100 [IU]/ML
23 INJECTION, SOLUTION SUBCUTANEOUS DAILY
Qty: 15 ML | Refills: 3 | Status: SHIPPED | OUTPATIENT
Start: 2024-04-16

## 2024-04-25 ENCOUNTER — TELEPHONE (OUTPATIENT)
Dept: FAMILY MEDICINE | Facility: CLINIC | Age: 29
End: 2024-04-25
Payer: COMMERCIAL

## 2024-04-25 NOTE — TELEPHONE ENCOUNTER
PA NEEDED:   SEMGLEE, YFGN, 100 UNIT/ML SOPN     Please call plan at [phone], patient id is [id number]

## 2024-05-08 ENCOUNTER — TELEPHONE (OUTPATIENT)
Dept: FAMILY MEDICINE | Facility: CLINIC | Age: 29
End: 2024-05-08
Payer: COMMERCIAL

## 2024-05-08 DIAGNOSIS — E10.9 TYPE 1 DIABETES MELLITUS WITHOUT COMPLICATION (H): ICD-10-CM

## 2024-05-08 NOTE — TELEPHONE ENCOUNTER
Pt calling stating she has changed insurances recently and that because of that she needs her insulin brand changed from Semglee to Lantus and they need provider approval in order to make that change.    Please advise,    Thanks!  Levi Tamayo RN   [facility]

## 2024-05-09 NOTE — TELEPHONE ENCOUNTER
Lab Results   Component Value Date    A1C 7.6 03/18/2024    A1C 8.1 10/11/2023    A1C 8.4 03/21/2023    A1C 7.8 03/22/2022    A1C 8.0 12/10/2020    A1C 9.1 01/24/2020     Ok with change in type of LA insulin. I think the current order allows for this change already.  But please verbal if not.    JESSY Cates

## 2024-05-09 NOTE — TELEPHONE ENCOUNTER
LVM requesting call back to relay provider approval    Kati JACKSON RN  [facility]

## 2024-05-09 NOTE — TELEPHONE ENCOUNTER
Prior Authorization Not Needed per Insurance    Medication: SEMGLEE (YFGN) 100 UNIT/ML SC SOPN  Insurance Company: [employer] ([facility]) - Phone [phone] Fax [phone]  Expected CoPay: $    Pharmacy Filling the Rx: [id number] - [facility] [address] AT [facility] [address]  Pharmacy Notified: YES  Patient Notified: Instructed pharmacy to notify patient once order is ready.

## 2024-05-13 NOTE — TELEPHONE ENCOUNTER
Patient is calling because she needs the prescription for Lantus to be sent to pharmacy.    Pharmacy pended.

## 2024-06-03 ENCOUNTER — MYC MEDICAL ADVICE (OUTPATIENT)
Dept: FAMILY MEDICINE | Facility: CLINIC | Age: 29
End: 2024-06-03
Payer: COMMERCIAL

## 2024-06-03 ENCOUNTER — TRANSFERRED RECORDS (OUTPATIENT)
Dept: HEALTH INFORMATION MANAGEMENT | Facility: CLINIC | Age: 29
End: 2024-06-03
Payer: COMMERCIAL

## 2024-06-03 DIAGNOSIS — E10.9 TYPE 1 DIABETES MELLITUS WITHOUT COMPLICATION (H): ICD-10-CM

## 2024-06-03 LAB — RETINOPATHY: NEGATIVE

## 2024-06-04 RX ORDER — INSULIN LISPRO 100 [IU]/ML
INJECTION, SOLUTION INTRAVENOUS; SUBCUTANEOUS
Qty: 30 ML | Refills: 3 | Status: SHIPPED | OUTPATIENT
Start: 2024-06-04

## 2024-06-04 RX ORDER — INSULIN LISPRO 100 [IU]/ML
INJECTION, SOLUTION INTRAVENOUS; SUBCUTANEOUS
Qty: 15 ML | Refills: 1 | Status: CANCELLED | OUTPATIENT
Start: 2024-06-04

## 2024-07-06 ENCOUNTER — HEALTH MAINTENANCE LETTER (OUTPATIENT)
Age: 29
End: 2024-07-06

## 2024-10-29 ENCOUNTER — VIRTUAL VISIT (OUTPATIENT)
Dept: OBGYN | Facility: CLINIC | Age: 29
End: 2024-10-29
Payer: COMMERCIAL

## 2024-10-29 VITALS — HEIGHT: 64 IN | BODY MASS INDEX: 29.95 KG/M2

## 2024-10-29 DIAGNOSIS — Z23 NEED FOR DIPHTHERIA-TETANUS-PERTUSSIS (TDAP) VACCINE: ICD-10-CM

## 2024-10-29 DIAGNOSIS — O09.91 ENCOUNTER FOR SUPERVISION OF HIGH RISK PREGNANCY IN FIRST TRIMESTER, ANTEPARTUM: Primary | ICD-10-CM

## 2024-10-29 PROCEDURE — 99207 PR NO CHARGE NURSE ONLY: CPT | Mod: 93

## 2024-10-29 RX ORDER — KETOROLAC TROMETHAMINE 30 MG/ML
INJECTION, SOLUTION INTRAMUSCULAR; INTRAVENOUS
COMMUNITY
Start: 2024-10-19

## 2024-10-29 RX ORDER — INSULIN GLARGINE 100 [IU]/ML
INJECTION, SOLUTION SUBCUTANEOUS
COMMUNITY
Start: 2024-09-11

## 2024-10-29 NOTE — PROGRESS NOTES
Important Information for Provider:     New ob nurse intake by phone, first pregnancy. Handouts reviewed. Discussed genetic screening. Recommended B6, Unisom for nausea. Ultrasound and NOB with Tatiana 11/13/2024  Patient is diabetic, uses insulin pen, upcoming appointment 11/19/2024       Caffeine intake/servings daily - 1  Calcium intake/servings daily - 3  Exercise 5 times weekly - describe ; walks, stretching, yoga, precautions given  Sunscreen used - Yes  Seatbelts used - Yes  Guns stored in the home - No  Self Breast Exam - Yes  Pap test up to date -  Yes  Dental exam up to date -  Yes  Immunizations reviewed and up to date - Yes  Abuse: Current or Past (Physical, Sexual or Emotional) - No  Do you feel safe in your environment - Yes  Do you cope well with stress - Yes    Prenatal OB Questionnaire  Patient supplied answers from flow sheet for:  Prenatal OB Questionnaire.  Past Medical History  Have you ever recieved care for your mental health? : (Patient-Rptd) No  Have you ever been in a major accident or suffered serious trauma?: (Patient-Rptd) No  Within the last year, has anyone hit, slapped, kicked or otherwise hurt you?: (Patient-Rptd) No  In the last year, has anyone forced you to have sex when you didn't want to?: (Patient-Rptd) No    Past Medical History 2   Have you ever received a blood transfusion?: (Patient-Rptd) No  Would you accept a blood transfusion if was medically recommended?: (Patient-Rptd) Yes  Does anyone in your home smoke?: (Patient-Rptd) No   Is your blood type Rh negative?: (Patient-Rptd) Unknown  Have you ever ?: (Patient-Rptd) No  Have you been hospitalized for a nonsurgical reason excluding normal delivery?: (!) (Patient-Rptd) Yes  Have you ever had an abnormal pap smear?: (Patient-Rptd) No    Past Medical History (Continued)  Do you have a history of abnormalities of the uterus?: (Patient-Rptd) No  Did your mother take JENIFFER or any other hormones when she was pregnant with  you?: (Patient-Rptd) Unknown  Do you have any other problems we have not asked about which you feel may be important to this pregnancy?: (Patient-Rptd) No                     Allergies as of 10/29/2024:    Allergies as of 10/29/2024    (No Known Allergies)               Early ultrasound screening tool:    Does patient have irregular periods?  No  Did patient use hormonal birth control in the three months prior to positive urine pregnancy test? No  Is the patient breastfeeding?  No  Is the patient 10 weeks or greater at time of education visit?  No

## 2024-11-04 NOTE — PROGRESS NOTES
Outcome for 24 2:17 PM: Data uploaded on Qraved  Bobbilynn Grossaint, VF  Outcome for 24 7:02 AM: Data obtained via Qraved website  Bobbilynn Grossaint, VF      This 28-year-old woman seen for f/u of her type 1 diabetes.  She was diagnosed in .     She had her first Ob visit last week.  She is  and  has an  LIZZ: 2025, by Last Menstrual Period.  She is currently about 10 weeks along.     She currently is taking Lantus 21 units at 8 PM daily.  She takes NovoLog 1 unit for 7 g of carb and a correction of 1 unit to drop her 44 blood sugars over 150.  She feels very confident with carb counting.  She generally tries to bolus before her meals.  She wears her perla sensor and the data are below.  She recognizes her hypoglycemia without difficulty.  She has not needed another to help her with low blood sugars.  She does have glucagon at home.  She does not have any regular exercise.  She is feeling much more tired since becoming pregnant and sits most of the day at work.  She tends to go to bed by 8:00.  She has been having nausea.  The nausea is relieved by eating carbohydrates.  She has been covering these carbohydrates but she is not used to eating things like breads and she thinks that her postmeal sugars are higher than they should be.  She is very excited about the pregnancy.  She is taking folate.    Saw her eye doctor in 2024.  She did not have any retinopathy.  She denies paresthesias or foot ulcers.  She has no chest pain or shortness of breath.  Aside from the nausea, she feels well.                  Current Outpatient Medications   Medication Sig Dispense Refill    Injection Device for insulin (INPEN 100-GREY-NOVOLOG-FIASP) RAJIV 1 each daily. 1 each 1    insulin aspart (NOVOPEN ECHO) 100 UNIT/ML cartridge Use 1 unit for 6 grams of carb and 1 unit to drop 40 over 150 up to 50 units a day 12 mL 3    blood glucose (NO BRAND SPECIFIED) lancets standard Use to test blood sugar 4 times  "daily or as directed. 100 each 4    blood glucose (NO BRAND SPECIFIED) test strip Use to test blood sugar 3 times daily or as directed. 50 strip 11    Continuous Blood Gluc Sensor (FREESTYLE BIGG 3 SENSOR) [facility] 1 each every 14 days 6 each 3    Continuous Glucose  (FREESTYLE BIGG 3 READER) RAJIV       Continuous Glucose Sensor (FREESTYLE BIGG 3 PLUS SENSOR) MISC Use 1 sensor every 15 days. Use to read blood sugars per 's instructions. 6 each 3    INSULIN GLARGINE 100 UNIT/ML pen INJECT 23 UNITS SUBCUTANEOUS AT BEDTIME      insulin pen needle (32G X 4 MM) 32G X 4 MM miscellaneous Use 5 pen needles daily or as directed. 400 each 3    NOVOLOG FLEXPEN 100 UNIT/ML soln Use 1 unit for 7 grams up to total of 30 units per day 30 mL 3    SEMGLEE, YFGN, 100 UNIT/ML SOPN Inject 23 Units Subcutaneous daily (Patient taking differently: Inject 21 Units subcutaneously daily.) 15 mL 3     No current facility-administered medications for this visit.                  11/13/2024  1437 Most Recent Value      Temp: -- -- 98.3  F (36.8  C)  as of 3/18/2024    Pulse: -- 77 77  as of 11/13/2024    BP: -- 121/79 121/79  as of 11/13/2024    Resp: -- -- 15  as of 3/18/2024    SpO2: -- 100 % 100%  as of 11/13/2024    Body Mass Index:   29.59 kg/m  Abnormal   1.626 m (5' 4\")  as of 10/29/2024  78.2 kg (172 lb 6.4 oz)  as of 11/13/2024        Recent Labs   Lab Test 03/18/24  1432 10/11/23  1055 03/21/23  1214 03/21/23  1123 03/22/22  1026 12/10/20  1000 12/10/20  0953 12/10/20  0939   A1C 7.6* 8.1*  --    < >  --    < > 8.0*  --    TSH  --  0.78  --   --   --   --   --  0.68   LDL  --   --   --   --   --   --  79  --    HDL  --   --   --   --   --   --  58  --    TRIG  --   --   --   --   --   --  90  --    CR 0.83 0.78  --    < >  --    < >  --  0.78   MICROL  --   --  <12.0  --  <5   < >  --   --     < > = values in this interval not displayed.     Assessment and plan:    1.  Diabetes control in the first trimester of her " pregnancy.  Charles's blood sugars are higher than we would like for pregnancy.  I told her our target is to have a fasting of 95 or less, a 1 hour postmeal value of 140 or less, and a 2-hour postmeal value of less than 120.  Her CGM shows that she is generally in target when she is sleeping and when she gets up in the morning.  This suggest that the Lantus dose is appropriate for her.  She does get too high after she eats.  I changed her carb ratio to 1 for 6.  I suggested she use an InPen device so we can track the insulin she is taking and when she does it.  The device may also help her calculate doses so she is more accurate.  I will send her to see the nurse educator to learn how to use this device.  She feels confident with carb counting but our nurse educator can do an assessment to see if any other education is needed.    2.  Diabetes complications.  I will repeat her creatinine.  Her urine albumin was negative in the past but I want repeat it now because of the pregnancy.  I reminded her to see her eye doctor after the first of the year.  Her feet are fine.    3.because she is pregnant, I will check her TSH.    I will arrange for her to see one of the team every 2 weeks between now and when she delivers her baby.    I spent 30 minutes with the patient on the video visit today (start time 3: 00 and end time 3: 3 0).  On the same day of visit I spent an additional 20 minutes reviewing her chart, reviewing and interpreting her lab and CGM data, ordering tests, and doing documentation.  The visit was done at my office away from clinic.    Lola Butler MD

## 2024-11-12 LAB
ABO/RH(D): NORMAL
ANTIBODY SCREEN: NEGATIVE
SPECIMEN EXPIRATION DATE: NORMAL

## 2024-11-13 ENCOUNTER — PRENATAL OFFICE VISIT (OUTPATIENT)
Dept: OBGYN | Facility: CLINIC | Age: 29
End: 2024-11-13
Payer: COMMERCIAL

## 2024-11-13 ENCOUNTER — ANCILLARY PROCEDURE (OUTPATIENT)
Dept: ULTRASOUND IMAGING | Facility: CLINIC | Age: 29
End: 2024-11-13
Payer: COMMERCIAL

## 2024-11-13 VITALS
DIASTOLIC BLOOD PRESSURE: 79 MMHG | WEIGHT: 172.4 LBS | HEART RATE: 77 BPM | BODY MASS INDEX: 29.59 KG/M2 | SYSTOLIC BLOOD PRESSURE: 121 MMHG | OXYGEN SATURATION: 100 %

## 2024-11-13 DIAGNOSIS — O09.91 ENCOUNTER FOR SUPERVISION OF HIGH RISK PREGNANCY IN FIRST TRIMESTER, ANTEPARTUM: ICD-10-CM

## 2024-11-13 DIAGNOSIS — O24.011 TYPE 1 DIABETES MELLITUS AFFECTING PREGNANCY IN FIRST TRIMESTER, ANTEPARTUM: ICD-10-CM

## 2024-11-13 DIAGNOSIS — O09.91 ENCOUNTER FOR SUPERVISION OF HIGH RISK PREGNANCY IN FIRST TRIMESTER, ANTEPARTUM: Primary | ICD-10-CM

## 2024-11-13 LAB
ALBUMIN UR-MCNC: NEGATIVE MG/DL
APPEARANCE UR: CLEAR
BILIRUB UR QL STRIP: NEGATIVE
COLOR UR AUTO: YELLOW
ERYTHROCYTE [DISTWIDTH] IN BLOOD BY AUTOMATED COUNT: 12.4 % (ref 10–15)
GLUCOSE UR STRIP-MCNC: NEGATIVE MG/DL
HBV SURFACE AG SERPL QL IA: NONREACTIVE
HCT VFR BLD AUTO: 41.4 % (ref 35–47)
HGB BLD-MCNC: 13.8 G/DL (ref 11.7–15.7)
HGB UR QL STRIP: NEGATIVE
HIV 1+2 AB+HIV1 P24 AG SERPL QL IA: NONREACTIVE
KETONES UR STRIP-MCNC: NEGATIVE MG/DL
LEUKOCYTE ESTERASE UR QL STRIP: NEGATIVE
MCH RBC QN AUTO: 28.2 PG (ref 26.5–33)
MCHC RBC AUTO-ENTMCNC: 33.3 G/DL (ref 31.5–36.5)
MCV RBC AUTO: 85 FL (ref 78–100)
NITRATE UR QL: NEGATIVE
PH UR STRIP: 6 [PH] (ref 5–7)
PLATELET # BLD AUTO: 288 10E3/UL (ref 150–450)
RBC # BLD AUTO: 4.9 10E6/UL (ref 3.8–5.2)
RUBV IGG SERPL QL IA: 0.15 INDEX
RUBV IGG SERPL QL IA: NORMAL
SP GR UR STRIP: 1.01 (ref 1–1.03)
T PALLIDUM AB SER QL: NONREACTIVE
UROBILINOGEN UR STRIP-ACNC: 0.2 E.U./DL
WBC # BLD AUTO: 10.1 10E3/UL (ref 4–11)

## 2024-11-13 PROCEDURE — 86900 BLOOD TYPING SEROLOGIC ABO: CPT

## 2024-11-13 PROCEDURE — 76817 TRANSVAGINAL US OBSTETRIC: CPT | Performed by: OBSTETRICS & GYNECOLOGY

## 2024-11-13 PROCEDURE — 87340 HEPATITIS B SURFACE AG IA: CPT

## 2024-11-13 PROCEDURE — 86850 RBC ANTIBODY SCREEN: CPT

## 2024-11-13 PROCEDURE — 86762 RUBELLA ANTIBODY: CPT

## 2024-11-13 PROCEDURE — 86780 TREPONEMA PALLIDUM: CPT

## 2024-11-13 PROCEDURE — 87086 URINE CULTURE/COLONY COUNT: CPT

## 2024-11-13 PROCEDURE — 87389 HIV-1 AG W/HIV-1&-2 AB AG IA: CPT

## 2024-11-13 PROCEDURE — 99203 OFFICE O/P NEW LOW 30 MIN: CPT

## 2024-11-13 PROCEDURE — 86803 HEPATITIS C AB TEST: CPT

## 2024-11-13 PROCEDURE — 86901 BLOOD TYPING SEROLOGIC RH(D): CPT

## 2024-11-13 PROCEDURE — G2211 COMPLEX E/M VISIT ADD ON: HCPCS

## 2024-11-13 PROCEDURE — 36415 COLL VENOUS BLD VENIPUNCTURE: CPT

## 2024-11-13 PROCEDURE — 81003 URINALYSIS AUTO W/O SCOPE: CPT

## 2024-11-13 PROCEDURE — 85027 COMPLETE CBC AUTOMATED: CPT

## 2024-11-13 NOTE — PROGRESS NOTES
SUBJECTIVE:     HPI:    This is a 29 year old female patient,  who presents for her first obstetrical visit.    LIZZ: 2025, by Last Menstrual Period.  She is 8w2d weeks.  Her cycles are regular.  Her last menstrual period was normal.   Since her LMP, she has experienced  nausea and fatigue).   She denies emesis, vaginal bleeding, and constipation.    Additional History:   -T1DM - Perlita Chris CNP PCP- Dr. 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      She has a history of   no prior ob hx significant for operative birth or htn    Since her last LMP she denies use of alcohol, tobacco and street drugs.    Past medical, surgical, social and family history were reviewed and updated in [employer].        Current Outpatient Medications   Medication Sig Dispense Refill    blood glucose (NO BRAND SPECIFIED) lancets standard Use to test blood sugar 4 times daily or as directed. 100 each 4    blood glucose (NO BRAND SPECIFIED) test strip Use to test blood sugar 3 times daily or as directed. 50 strip 11    Continuous Blood Gluc Sensor (FREESTYLE BIGG 3 SENSOR) MISC 1 each every 14 days 6 each 3    Continuous Glucose  (FREESTYLE BIGG 3 READER) RAJIV       Continuous Glucose Sensor (FREESTYLE BIGG 3 PLUS SENSOR) MISC Use 1 sensor every 15 days. Use to read blood sugars per 's instructions. 6 each 3    INSULIN GLARGINE 100 UNIT/ML pen INJECT 23 UNITS SUBCUTANEOUS AT BEDTIME      insulin pen needle (32G X 4 MM) 32G X 4 MM miscellaneous Use 5 pen needles daily or as directed. 400 each 3    NOVOLOG FLEXPEN 100 UNIT/ML soln Use 1 unit for 7 grams up to total of 30 units per day 30 mL 3    SEMGLEE, YFGN, 100 UNIT/ML SOPN Inject 23 Units Subcutaneous daily (Patient taking differently:  Inject 21 Units subcutaneously daily.) 15 mL 3     No current facility-administered medications for this visit.       ROS:   12 point review of systems negative other than symptoms noted below or in the HPI.      OBJECTIVE:     EXAM:  /79 (BP Location: Left arm, Patient Position: Sitting)   Pulse 77   Wt 78.2 kg (172 lb 6.4 oz)   LMP 2024   SpO2 100%   BMI 29.59 kg/m   Body mass index is 29.59 kg/m .    GENERAL: alert and no distress  EYES: Eyes grossly normal to inspection, PERRL and conjunctivae and sclerae normal  NECK: no adenopathy, no asymmetry, masses, or scars  RESP: lungs clear to auscultation - no rales, rhonchi or wheezes  CV: RRR  ABDOMEN: soft, nontender, no hepatosplenomegaly, no masses and bowel sounds normal  MS: no gross musculoskeletal defects noted, no edema  SKIN: no suspicious lesions or rashes  NEURO: Normal strength and tone, mentation intact and speech normal  PSYCH: mentation appears normal, affect normal/bright    ASSESSMENT/PLAN:     1. Encounter for supervision of high risk pregnancy in first trimester, antepartum (Primary)  - UA without Microscopic - lab collect  - Hepatitis C antibody  - Urine Culture Aerobic Bacterial  - Treponema Abs w Reflex to RPR and Titer  - Rubella Antibody IgG  - HIV Antigen Antibody Combo  - CBC with platelets  - Hepatitis B surface antigen  - ABO/Rh type and screen    2. Type 1 diabetes mellitus affecting pregnancy in first trimester, antepartum  -keep apt with Dr. Butler as planned  - [facility] Referral - Pregnancy; Future      29 year old , 8w2d weeks of pregnancy with LIZZ of 2025, by Last Menstrual Period    Discussed as follows:  -NOB labs today  -pap due pp  -flu utd, declines covid, tdap at 28 wks  -b6 25 mg tid with 12.5 mg unisom at bedtime for nausea  -small scb call with vaginal bleeding  -[facility] MD care for pregnancy residents ok  -delivery [facility]  -mychart / RN triage for contact  -discussed importance of tight  glycemic control for pregnancy- has apt with Dr. Hector paulson wee  -undecided on genetic counseling  -discussed MFM apt for t1DM for US    Counseling given:   - Follow up in 4-6 weeks for return OB visit.  - Recommended weight gain for pregnancy: 15-25 lbs.         PLAN/PATIENT INSTRUCTIONS:    -rtc 4 wks, schedule FAS lvl 2 with mfm at 18-20 wks     VELASQUEZ Solano CNP

## 2024-11-14 ENCOUNTER — TRANSCRIBE ORDERS (OUTPATIENT)
Dept: MATERNAL FETAL MEDICINE | Facility: HOSPITAL | Age: 29
End: 2024-11-14
Payer: COMMERCIAL

## 2024-11-14 DIAGNOSIS — O26.90 PREGNANCY RELATED CONDITION, ANTEPARTUM: Primary | ICD-10-CM

## 2024-11-14 LAB — HCV AB SERPL QL IA: NONREACTIVE

## 2024-11-15 LAB — BACTERIA UR CULT: NO GROWTH

## 2024-11-19 ENCOUNTER — VIRTUAL VISIT (OUTPATIENT)
Dept: ENDOCRINOLOGY | Facility: CLINIC | Age: 29
End: 2024-11-19
Payer: COMMERCIAL

## 2024-11-19 VITALS — WEIGHT: 170 LBS | HEIGHT: 64 IN | BODY MASS INDEX: 29.02 KG/M2

## 2024-11-19 DIAGNOSIS — E10.9 TYPE 1 DIABETES MELLITUS WITHOUT COMPLICATION (H): ICD-10-CM

## 2024-11-19 DIAGNOSIS — Z33.1 PREGNANCY, INCIDENTAL: ICD-10-CM

## 2024-11-19 RX ORDER — INSULIN PEN,REUSABLE,BT LISPRO
1 INSULIN PEN (EA) SUBCUTANEOUS DAILY
Qty: 1 EACH | Refills: 1 | Status: SHIPPED | OUTPATIENT
Start: 2024-11-19

## 2024-11-19 ASSESSMENT — PAIN SCALES - GENERAL: PAINLEVEL_OUTOF10: NO PAIN (0)

## 2024-11-19 NOTE — LETTER
2024       RE: Isaiah Jarquin  [address] [address]     Dear Colleague,    Thank you for referring your patient, Isaiah Jarquin, to the [facility] [place] at [facility]. Please see a copy of my visit note below.    Outcome for 24 2:17 PM: Data uploaded on [employer]  Bobbilynn Grossaint, VF  Outcome for 24 7:02 AM: Data obtained via [employer] website  Bobbilynn Grossaint, VF      This 28-year-old woman seen for f/u of her type 1 diabetes.  She was diagnosed in .     She had her first Ob visit last week.  She is  and  has an  LIZZ: 2025, by Last Menstrual Period.  She is currently about 10 weeks along.     She currently is taking Lantus 21 units at 8 PM daily.  She takes NovoLog 1 unit for 7 g of carb and a correction of 1 unit to drop her 44 blood sugars over 150.  She feels very confident with carb counting.  She generally tries to bolus before her meals.  She wears her perla sensor and the data are below.  She recognizes her hypoglycemia without difficulty.  She has not needed another to help her with low blood sugars.  She does have glucagon at home.  She does not have any regular exercise.  She is feeling much more tired since becoming pregnant and sits most of the day at work.  She tends to go to bed by 8:00.  She has been having nausea.  The nausea is relieved by eating carbohydrates.  She has been covering these carbohydrates but she is not used to eating things like breads and she thinks that her postmeal sugars are higher than they should be.  She is very excited about the pregnancy.  She is taking folate.    Saw her eye doctor in 2024.  She did not have any retinopathy.  She denies paresthesias or foot ulcers.  She has no chest pain or shortness of breath.  Aside from the nausea, she feels well.                  Current Outpatient Medications   Medication Sig Dispense Refill  "    Injection Device for insulin (INPEN 100-GREY-NOVOLOG-FIASP) RAJIV 1 each daily. 1 each 1     insulin aspart (NOVOPEN ECHO) 100 UNIT/ML cartridge Use 1 unit for 6 grams of carb and 1 unit to drop 40 over 150 up to 50 units a day 12 mL 3     blood glucose (NO BRAND SPECIFIED) lancets standard Use to test blood sugar 4 times daily or as directed. 100 each 4     blood glucose (NO BRAND SPECIFIED) test strip Use to test blood sugar 3 times daily or as directed. 50 strip 11     Continuous Blood Gluc Sensor (FREESTYLE BIGG 3 SENSOR) MISC 1 each every 14 days 6 each 3     Continuous Glucose  (FREESTYLE BIGG 3 READER) RAJIV        Continuous Glucose Sensor (FREESTYLE BIGG 3 PLUS SENSOR) MISC Use 1 sensor every 15 days. Use to read blood sugars per 's instructions. 6 each 3     INSULIN GLARGINE 100 UNIT/ML pen INJECT 23 UNITS SUBCUTANEOUS AT BEDTIME       insulin pen needle (32G X 4 MM) 32G X 4 MM miscellaneous Use 5 pen needles daily or as directed. 400 each 3     NOVOLOG FLEXPEN 100 UNIT/ML soln Use 1 unit for 7 grams up to total of 30 units per day 30 mL 3     SEMGLEE, YFGN, 100 UNIT/ML SOPN Inject 23 Units Subcutaneous daily (Patient taking differently: Inject 21 Units subcutaneously daily.) 15 mL 3     No current facility-administered medications for this visit.                  11/13/2024  1437 Most Recent Value      Temp: -- -- 98.3  F (36.8  C)  as of 3/18/2024    Pulse: -- 77 77  as of 11/13/2024    BP: -- 121/79 121/79  as of 11/13/2024    Resp: -- -- 15  as of 3/18/2024    SpO2: -- 100 % 100%  as of 11/13/2024    Body Mass Index:   29.59 kg/m  Abnormal   1.626 m (5' 4\")  as of 10/29/2024  78.2 kg (172 lb 6.4 oz)  as of 11/13/2024        Recent Labs   Lab Test 03/18/24  1432 10/11/23  1055 03/21/23  1214 03/21/23  1123 03/22/22  1026 12/10/20  1000 12/10/20  0953 12/10/20  0939   A1C 7.6* 8.1*  --    < >  --    < > 8.0*  --    TSH  --  0.78  --   --   --   --   --  0.68   LDL  --   --   --   " --   --   --  79  --    HDL  --   --   --   --   --   --  58  --    TRIG  --   --   --   --   --   --  90  --    CR 0.83 0.78  --    < >  --    < >  --  0.78   MICROL  --   --  <12.0  --  <5   < >  --   --     < > = values in this interval not displayed.     Assessment and plan:    1.  Diabetes control in the first trimester of her pregnancy.  Charles's blood sugars are higher than we would like for pregnancy.  I told her our target is to have a fasting of 95 or less, a 1 hour postmeal value of 140 or less, and a 2-hour postmeal value of less than 120.  Her CGM shows that she is generally in target when she is sleeping and when she gets up in the morning.  This suggest that the Lantus dose is appropriate for her.  She does get too high after she eats.  I changed her carb ratio to 1 for 6.  I suggested she use an InPen device so we can track the insulin she is taking and when she does it.  The device may also help her calculate doses so she is more accurate.  I will send her to see the nurse educator to learn how to use this device.  She feels confident with carb counting but our nurse educator can do an assessment to see if any other education is needed.    2.  Diabetes complications.  I will repeat her creatinine.  Her urine albumin was negative in the past but I want repeat it now because of the pregnancy.  I reminded her to see her eye doctor after the first of the year.  Her feet are fine.    3.because she is pregnant, I will check her TSH.    I will arrange for her to see one of the team every 2 weeks between now and when she delivers her baby.    I spent 30 minutes with the patient on the video visit today (start time 3: 00 and end time 3: 3 0).  On the same day of visit I spent an additional 20 minutes reviewing her chart, reviewing and interpreting her lab and CGM data, ordering tests, and doing documentation.  The visit was done at my office away from clinic.    Lola Butler MD      Again, thank you  for allowing me to participate in the care of your patient.      Sincerely,    Lola Butler MD

## 2024-11-19 NOTE — PATIENT INSTRUCTIONS
Change your carb unit to 1 unit for 6 g    I prescribed an InPen device and NovoLog cartridges for you.  Please pick them up and get started on using them.    I put in referral to the nurse educator to help you with the InPen device.    Have your labs done in the next couple of weeks. Please contact us to schedule at any of our [place] lab locations  Call [phone] ([phone]), select option 1     Glucose targets for pregnancy are fasting blood sugars less than 95, 1 hour postmeal blood sugars less than 140, and 2-hour postmeal blood sugars less than 120    Send me a [employer] message if the clinic does not contact you to set up our follow-up visits by next week.    Make an appointment to see your eye doctor after January

## 2024-11-19 NOTE — NURSING NOTE
Current patient location: Patient declined to provide     Is the patient currently in the state of [place]? YES    Visit mode:VIDEO    If the visit is dropped, the patient can be reconnected by:VIDEO VISIT: Send to e-mail at: [email or web address]    Will anyone else be joining the visit? NO  (If patient encounters technical issues they should call [phone]    Are changes needed to the allergy or medication list? No    Are refills needed on medications prescribed by this physician? NO    Rooming Documentation:  Questionnaire(s) completed    Reason for visit: RECHECK    Addie AUGUSTINEF

## 2024-11-23 ENCOUNTER — HEALTH MAINTENANCE LETTER (OUTPATIENT)
Age: 29
End: 2024-11-23

## 2024-12-06 ENCOUNTER — TELEPHONE (OUTPATIENT)
Dept: ENDOCRINOLOGY | Facility: CLINIC | Age: 29
End: 2024-12-06
Payer: COMMERCIAL

## 2024-12-06 NOTE — TELEPHONE ENCOUNTER
Prior Authorization Retail Medication Request    Medication/Dose: Pam [place]brook. Novolog use daily     Diagnosis and ICD code (if different than what is on RX):  E10.9     New/renewal/insurance change PA/secondary ins. PA:  Previously Tried and Failed:    Rationale:  Type 1 diabetes mellitus without complication (H)     Insurance   Primary:   Insurance ID:      Secondary (if applicable):  Insurance ID:      Pharmacy Information (if different than what is on RX)  Name:    Phone:    Fax:    Clinic Information  Preferred routing pool for dept communication:

## 2024-12-10 NOTE — TELEPHONE ENCOUNTER
Prior Authorization Not Needed per Insurance    Medication: INPEN 100-GREY-NOVOLOG-FIASP RAJIV  Insurance Company: [employer] ([facility]) - Phone [phone] Fax [phone]  Expected CoPay: $    Pharmacy Filling the Rx: [id number] - [facility] [address] AT [facility] [address]  Pharmacy Notified: y  Patient Notified: Instructed pharmacy to notify patient once order is ready.

## 2024-12-11 ENCOUNTER — PRENATAL OFFICE VISIT (OUTPATIENT)
Dept: OBGYN | Facility: CLINIC | Age: 29
End: 2024-12-11
Payer: COMMERCIAL

## 2024-12-11 VITALS
HEART RATE: 60 BPM | SYSTOLIC BLOOD PRESSURE: 124 MMHG | OXYGEN SATURATION: 90 % | DIASTOLIC BLOOD PRESSURE: 85 MMHG | WEIGHT: 178.2 LBS | BODY MASS INDEX: 30.59 KG/M2

## 2024-12-11 DIAGNOSIS — Z33.1 PREGNANCY, INCIDENTAL: ICD-10-CM

## 2024-12-11 DIAGNOSIS — E10.9 TYPE 1 DIABETES MELLITUS WITHOUT COMPLICATION (H): ICD-10-CM

## 2024-12-11 DIAGNOSIS — O24.011 TYPE 1 DIABETES MELLITUS AFFECTING PREGNANCY IN FIRST TRIMESTER, ANTEPARTUM: Primary | ICD-10-CM

## 2024-12-11 LAB
ALBUMIN MFR UR ELPH: <6 MG/DL
ALBUMIN SERPL BCG-MCNC: 4.3 G/DL (ref 3.5–5.2)
ALP SERPL-CCNC: 69 U/L (ref 40–150)
ALT SERPL W P-5'-P-CCNC: 16 U/L (ref 0–50)
ANION GAP SERPL CALCULATED.3IONS-SCNC: 10 MMOL/L (ref 7–15)
AST SERPL W P-5'-P-CCNC: 20 U/L (ref 0–45)
BILIRUB SERPL-MCNC: 0.3 MG/DL
BUN SERPL-MCNC: 10.9 MG/DL (ref 6–20)
CALCIUM SERPL-MCNC: 9.6 MG/DL (ref 8.8–10.4)
CHLORIDE SERPL-SCNC: 102 MMOL/L (ref 98–107)
CREAT SERPL-MCNC: 0.67 MG/DL (ref 0.51–0.95)
CREAT UR-MCNC: 20.8 MG/DL
EGFRCR SERPLBLD CKD-EPI 2021: >90 ML/MIN/1.73M2
EST. AVERAGE GLUCOSE BLD GHB EST-MCNC: 151 MG/DL
GLUCOSE SERPL-MCNC: 149 MG/DL (ref 70–99)
HBA1C MFR BLD: 6.9 % (ref 0–5.6)
HCO3 SERPL-SCNC: 24 MMOL/L (ref 22–29)
POTASSIUM SERPL-SCNC: 4.2 MMOL/L (ref 3.4–5.3)
PROT SERPL-MCNC: 6.9 G/DL (ref 6.4–8.3)
PROT/CREAT 24H UR: NORMAL MG/G{CREAT}
SODIUM SERPL-SCNC: 136 MMOL/L (ref 135–145)
TSH SERPL DL<=0.005 MIU/L-ACNC: 0.83 UIU/ML (ref 0.3–4.2)

## 2024-12-11 PROCEDURE — 84156 ASSAY OF PROTEIN URINE: CPT | Performed by: OBSTETRICS & GYNECOLOGY

## 2024-12-11 PROCEDURE — 84443 ASSAY THYROID STIM HORMONE: CPT | Performed by: OBSTETRICS & GYNECOLOGY

## 2024-12-11 PROCEDURE — 36415 COLL VENOUS BLD VENIPUNCTURE: CPT | Performed by: OBSTETRICS & GYNECOLOGY

## 2024-12-11 PROCEDURE — 80053 COMPREHEN METABOLIC PANEL: CPT | Performed by: OBSTETRICS & GYNECOLOGY

## 2024-12-11 PROCEDURE — 83036 HEMOGLOBIN GLYCOSYLATED A1C: CPT | Performed by: OBSTETRICS & GYNECOLOGY

## 2024-12-11 ASSESSMENT — PATIENT HEALTH QUESTIONNAIRE - PHQ9
10. IF YOU CHECKED OFF ANY PROBLEMS, HOW DIFFICULT HAVE THESE PROBLEMS MADE IT FOR YOU TO DO YOUR WORK, TAKE CARE OF THINGS AT HOME, OR GET ALONG WITH OTHER PEOPLE: NOT DIFFICULT AT ALL
SUM OF ALL RESPONSES TO PHQ QUESTIONS 1-9: 2
SUM OF ALL RESPONSES TO PHQ QUESTIONS 1-9: 2

## 2024-12-11 NOTE — PROGRESS NOTES
12w2d overall feeling good, much improved.  Working with her endocrinologist to adjust insulin needs.  Still running higher than desired, but improving.  HgbA1c ordered by luzmaria, will draw today and I will add baseline HELLP labs.  Genetic screening declined. Level 2 scheduled.  RTC 4 weeks  jlp

## 2024-12-12 ENCOUNTER — TELEPHONE (OUTPATIENT)
Dept: ENDOCRINOLOGY | Facility: CLINIC | Age: 29
End: 2024-12-12
Payer: COMMERCIAL

## 2024-12-12 NOTE — TELEPHONE ENCOUNTER
Patient Contacted for the patient to call back and schedule the following:    Appointment type: return diabetes   Provider: Luke   Return date: Put her on my clinic for January 7 at 5 PM, February 18 at 4:30 PM, March 18 at 5 PM, May 6 QUINO slot, June appts avail in solutions.   Specialty phone number:    Additional appointment(s) needed: bi-weekly appts. Schedule w Heidi Becerril CDE or PA every 2 weeks coordinating with Dr. Butler appts above until due date.   Additonal Notes: Spoke to pt, they were driving and had a busy day asked for a MyC message with our call back number to schedule.   Check Out Comments from 11/19:   1.Please schedule her to see one of the APPs, me, or a nurse educator every 2 weeks through June.  2. Put her on my clinic for January 7 at 5 PM, February 18 at 4:30 PM, March 18 at 5 PM, May 6, Rhianna     *This is not a scheduled slot in Epic. You will have to manually add this patient onto the providers schedule by hitting select a time under the providers name when scheduling to add this patient by date, time, and location as stated above. Thank you*    Lola Hernandes on 12/12/2024 at 8:45 AM

## 2024-12-18 ENCOUNTER — TELEPHONE (OUTPATIENT)
Dept: ENDOCRINOLOGY | Facility: CLINIC | Age: 29
End: 2024-12-18
Payer: COMMERCIAL

## 2024-12-18 NOTE — TELEPHONE ENCOUNTER
Left Voicemail (1st Attempt) for the patient to call back and schedule the following:    Appointment type: RETURN GDM  Provider: Lola Butler MD  Return date: 01/07/25 and seen biweekly afterwards by CDE/MD/JAYCE until end of pregnancy.  Specialty phone number: [phone]  Additional appointment(s) needed: N/A  Additonal Notes: LVM,     Please see TE from 12/18 for examples of how to schedule. Keeping one Book It for monthly CDE visits and another for MD/JAYCE visits has been the most successful.     If scheduling is too complex, please schedule either monthly visits with Heidi Becerril for CDE or Dr. Butler/JAYCE (if Luke unavailable) and the opposite provider can be scheduled separately.     01/07 [place] virtual 5:00 add on (provider okay'd)*  Week of 01/21 or 01/28 with CDE  02/18 [place] virtual 4:30 QUIN (provider okay'd)**  Week of 03/04 with CDE  03/18 [place] virtual 5:00 add on (provider okay'd)*  Week of 04/01 with CDE  Week of 04/14 with any JAYCE that sees Dx (should be options in solutions)  05/06 [place] virtual in solutions  Week of 05/20 with CDE  06/03 [place] virtual in solutions  Week of 06/17 with CDE    * This is not a scheduled slot in Epic. You will have to manually add this patient onto the provider's schedule by hitting select a time under the provider's name when scheduling to add this patient by date, time, and location as stated above. Thank you *      Aubree Gacria on 12/18/2024 at 2:20 PM

## 2024-12-23 NOTE — PROGRESS NOTES
Virtual Visit Details    Type of service:  Video Visit     Originating Location (pt. Location): Home    Distant Location (provider location):  Off-site  Platform used for Video Visit: AmLeonidas    Outcome for 24 12:18 PM: Data uploaded on [employer]  Bobbilynn Grossaint, VF  Outcome for 25 9:53 AM: Data obtained via [employer] website  Bobbilynn Grossaint, VF  This 29-year-old woman seen for f/u of her type 1 diabetes.  She was diagnosed in . She is now pregnant. She is  and  has an  LIZZ: 2025, by Last Menstrual Period.  She is currently about 16 weeks along.     She currently is taking Lantus 19 units at 8 PM daily.  She takes NovoLog 1 unit for 7 g of carb and a correction of 1 unit to drop her 40 blood sugars over 150.  She feels very confident with carb counting.  She generally tries to bolus 15 minutes before her meals.  She wears her perla sensor and the data are below.  She recognizes her hypoglycemia without difficulty.  She has not needed another to help her with low blood sugars.  She does have glucagon at home.  She does not have any regular exercise.  She is feeling much more tired since becoming pregnant and sits most of the day at work.  She tends to go to bed by 8:00.  She is no longer having nausea and is eating well.  She is taking her prenatal vitamins.    A few days ago, she reduced her Lantus by 1 unit because she was having lows at about 3 in the morning.  This did not completely get rid of the lows so she reduced it another unit a few days ago.  Her most recent overnight values are free of hypoglycemia.  She has met with the educator and tried to get an InPen.  There was some problem with this prescription (may be because her company will cover Humalog not NovoLog) and she will try to get the InPen again.  She is not sure she will be able to optimally use the device but will try to do so.    Saw her eye doctor in 2024.  She did not have any retinopathy.  She denies paresthesias  "or foot ulcers.  She has no chest pain or shortness of breath.                Current Outpatient Medications   Medication Sig Dispense Refill    blood glucose (NO BRAND SPECIFIED) lancets standard Use to test blood sugar 4 times daily or as directed. 100 each 4    blood glucose (NO BRAND SPECIFIED) test strip Use to test blood sugar 3 times daily or as directed. 50 strip 11    Continuous Glucose Sensor (FREESTYLE BIGG 3 PLUS SENSOR) MISC Use 1 sensor every 15 days. Use to read blood sugars per 's instructions. 6 each 3    Injection Device for insulin (INPEN 100-GREY-NOVOLOG-FIASP) RAJIV 1 each daily. 1 each 1    insulin aspart (NOVOPEN ECHO) 100 UNIT/ML cartridge Use 1 unit for 6 grams of carb and 1 unit to drop 40 over 150 up to 50 units a day 12 mL 3    INSULIN GLARGINE 100 UNIT/ML pen INJECT 23 UNITS SUBCUTANEOUS AT BEDTIME      insulin pen needle (32G X 4 MM) 32G X 4 MM miscellaneous Use 5 pen needles daily or as directed. 400 each 3    NOVOLOG FLEXPEN 100 UNIT/ML soln Use 1 unit for 7 grams up to total of 30 units per day 30 mL 3    SEMGLEE, YFGN, 100 UNIT/ML SOPN Inject 23 Units Subcutaneous daily (Patient taking differently: Inject 21 Units subcutaneously daily.) 15 mL 3     No current facility-administered medications for this visit.        12/11/2024  1014 12/13/2024  1020 1/7/2025  1640 Most Recent Value   Temp: -- -- -- 98.3  F (36.8  C)  as of 3/18/2024   Pulse: 60 -- -- 60  as of 12/11/2024   BP: 124/85 -- -- 124/85  as of 12/11/2024   Resp: -- -- -- 15  as of 3/18/2024   SpO2: 90 % -- -- 90%  as of 12/11/2024   Body Mass Index:    None   Systolic (Patient Repo...: -- -- -- Not taken   Diastolic (Patient Rep...: -- -- -- Not taken   Height: -- -- -- 1.626 m (5' 4\")  as of 11/19/2024   Weight: 80.8 kg (178 lb 3.2 oz) --       On exam she is in no acute distress  Recent Labs   Lab Test 12/11/24  1036 03/18/24  1432 10/11/23  1055 03/21/23  1214 03/21/23  1123 03/22/22  1026 12/10/20  1000 " 12/10/20  0953   A1C 6.9* 7.6* 8.1*  --    < >  --    < > 8.0*   TSH 0.83  --  0.78  --   --   --   --   --    LDL  --   --   --   --   --   --   --  79   HDL  --   --   --   --   --   --   --  58   TRIG  --   --   --   --   --   --   --  90   CR 0.67 0.83 0.78  --    < >  --    < >  --    MICROL  --   --   --  <12.0  --  <5   < >  --     < > = values in this interval not displayed.       Assessment and plan:    1.  Diabetes control.  Her A1c has come down below 7.  She is now in her second trimester and no longer having nausea.  This allows her to eat a little bit better.  She has been having trouble targeting her morning glucose appropriately.  On the higher dose of Lantus she has lows at 3 in the morning but when she reduces the Lantus her fasting sugar is above 95.  I suggested we start Tresiba at the same dose.  I will send a prescription to her pharmacy.  Walking through her glucose data shows that she is not doing corrections if she is above target after meals.  I suggested she begin to do those corrections based on a 2-hour postmeal sugar.  If she is above 160 she should give herself a correction.  It also looks as if her noontime carb ratio is inadequate.  I suggested she increase it to 1 unit for 6 g from the 1 unit for 7 g she is using currently.  I reminded her that our targets are a fasting of 95, 1 hour postmeal glucose of 140 and a 2-hour postmeal glucose of 120.  She will let me know when she wants me to send a new prescription for the InPen to her pharmacy.    2.  Diabetes complications.  She has none.    Follow-up as scheduled every 2 weeks for the duration of her pregnancy.    I spent 35 minutes on the video visit with the patient today (start time 5: 00 and end time 5: 3 5).  The visit was done at my office away from clinic.  On the same day of visit I spent an additional 15 minutes reviewing her CGM data, reviewing her lab and chart, and doing documentation.  Lola Butler MD

## 2024-12-27 DIAGNOSIS — E10.9 TYPE 1 DIABETES MELLITUS WITHOUT COMPLICATION (H): ICD-10-CM

## 2024-12-31 RX ORDER — INSULIN ASPART 100 [IU]/ML
INJECTION, SOLUTION INTRAVENOUS; SUBCUTANEOUS
Qty: 30 ML | Refills: 3 | Status: SHIPPED | OUTPATIENT
Start: 2024-12-31 | End: 2025-01-02

## 2025-01-02 ENCOUNTER — MYC MEDICAL ADVICE (OUTPATIENT)
Dept: ENDOCRINOLOGY | Facility: CLINIC | Age: 30
End: 2025-01-02
Payer: COMMERCIAL

## 2025-01-02 DIAGNOSIS — E10.9 TYPE 1 DIABETES MELLITUS WITHOUT COMPLICATION (H): ICD-10-CM

## 2025-01-02 RX ORDER — INSULIN ASPART 100 [IU]/ML
INJECTION, SOLUTION INTRAVENOUS; SUBCUTANEOUS
Qty: 30 ML | Refills: 3 | Status: SHIPPED | OUTPATIENT
Start: 2025-01-02

## 2025-01-07 ENCOUNTER — VIRTUAL VISIT (OUTPATIENT)
Dept: ENDOCRINOLOGY | Facility: CLINIC | Age: 30
End: 2025-01-07
Payer: COMMERCIAL

## 2025-01-07 DIAGNOSIS — E10.9 TYPE 1 DIABETES MELLITUS WITHOUT COMPLICATION (H): ICD-10-CM

## 2025-01-07 PROCEDURE — G2211 COMPLEX E/M VISIT ADD ON: HCPCS | Performed by: INTERNAL MEDICINE

## 2025-01-07 PROCEDURE — 98007 SYNCH AUDIO-VIDEO EST HI 40: CPT | Performed by: INTERNAL MEDICINE

## 2025-01-07 RX ORDER — INSULIN ASPART 100 [IU]/ML
INJECTION, SOLUTION INTRAVENOUS; SUBCUTANEOUS
Qty: 30 ML | Refills: 3 | Status: CANCELLED | OUTPATIENT
Start: 2025-01-07

## 2025-01-07 RX ORDER — INSULIN LISPRO 100 [IU]/ML
INJECTION, SOLUTION INTRAVENOUS; SUBCUTANEOUS
Qty: 15 ML | Refills: 2 | Status: SHIPPED | OUTPATIENT
Start: 2025-01-07

## 2025-01-07 RX ORDER — INSULIN DEGLUDEC 100 U/ML
15 INJECTION, SOLUTION SUBCUTANEOUS DAILY
Qty: 15 ML | Refills: 3 | Status: SHIPPED | OUTPATIENT
Start: 2025-01-07 | End: 2025-01-07

## 2025-01-07 RX ORDER — INSULIN DEGLUDEC 100 U/ML
19 INJECTION, SOLUTION SUBCUTANEOUS DAILY
Qty: 15 ML | Refills: 3 | Status: SHIPPED | OUTPATIENT
Start: 2025-01-07

## 2025-01-07 NOTE — NURSING NOTE
Current patient location: [address] [address]    Is the patient currently in the state of [place]? YES    Visit mode:VIDEO    If the visit is dropped, the patient can be reconnected by:VIDEO VISIT: Text to cell phone:   Telephone Information:   Mobile [phone]    and VIDEO VISIT: Send to e-mail at: [email or web address]    Will anyone else be joining the visit? NO  (If patient encounters technical issues they should call [phone])    Are changes needed to the allergy or medication list? Pt stated no med changes    Are refills needed on medications prescribed by this physician? YES    Rooming Documentation:  Not applicable    Reason for visit: RECHECK (GDM follow-up )    Ruthann HOUSTON

## 2025-01-07 NOTE — LETTER
2025       RE: Isaiah Jarquin  [address] [facility] [address]     Dear Colleague,    Thank you for referring your patient, Isaiah Jarquin, to the [facility] ENDOCRINOLOGY CLINIC [place] at [facility]. Please see a copy of my visit note below.    Virtual Visit Details    Type of service:  Video Visit     Originating Location (pt. Location): Home    Distant Location (provider location):  Off-site  Platform used for Video Visit: [employer]    Outcome for 24 12:18 PM: Data uploaded on [employer]  Bobbilynn Grossaint, VF  Outcome for 25 9:53 AM: Data obtained via [employer] website  Bobbilynn Grossaint, VF  This 29-year-old woman seen for f/u of her type 1 diabetes.  She was diagnosed in . She is now pregnant. She is  and  has an  LIZZ: 2025, by Last Menstrual Period.  She is currently about 16 weeks along.     She currently is taking Lantus 19 units at 8 PM daily.  She takes NovoLog 1 unit for 7 g of carb and a correction of 1 unit to drop her 40 blood sugars over 150.  She feels very confident with carb counting.  She generally tries to bolus 15 minutes before her meals.  She wears her perla sensor and the data are below.  She recognizes her hypoglycemia without difficulty.  She has not needed another to help her with low blood sugars.  She does have glucagon at home.  She does not have any regular exercise.  She is feeling much more tired since becoming pregnant and sits most of the day at work.  She tends to go to bed by 8:00.  She is no longer having nausea and is eating well.  She is taking her prenatal vitamins.    A few days ago, she reduced her Lantus by 1 unit because she was having lows at about 3 in the morning.  This did not completely get rid of the lows so she reduced it another unit a few days ago.  Her most recent overnight values are free of hypoglycemia.  She has met with the educator and tried to get an InPen.   There was some problem with this prescription (may be because her company will cover Humalog not NovoLog) and she will try to get the InPen again.  She is not sure she will be able to optimally use the device but will try to do so.    Saw her eye doctor in June 2024.  She did not have any retinopathy.  She denies paresthesias or foot ulcers.  She has no chest pain or shortness of breath.                Current Outpatient Medications   Medication Sig Dispense Refill     blood glucose (NO BRAND SPECIFIED) lancets standard Use to test blood sugar 4 times daily or as directed. 100 each 4     blood glucose (NO BRAND SPECIFIED) test strip Use to test blood sugar 3 times daily or as directed. 50 strip 11     Continuous Glucose Sensor (FREESTYLE BIGG 3 PLUS SENSOR) MISC Use 1 sensor every 15 days. Use to read blood sugars per 's instructions. 6 each 3     Injection Device for insulin (INPEN 100-GREY-NOVOLOG-FIASP) RAJIV 1 each daily. 1 each 1     insulin aspart (NOVOPEN ECHO) 100 UNIT/ML cartridge Use 1 unit for 6 grams of carb and 1 unit to drop 40 over 150 up to 50 units a day 12 mL 3     INSULIN GLARGINE 100 UNIT/ML pen INJECT 23 UNITS SUBCUTANEOUS AT BEDTIME       insulin pen needle (32G X 4 MM) 32G X 4 MM miscellaneous Use 5 pen needles daily or as directed. 400 each 3     NOVOLOG FLEXPEN 100 UNIT/ML soln Use 1 unit for 7 grams up to total of 30 units per day 30 mL 3     SEMGLEE, YFGN, 100 UNIT/ML SOPN Inject 23 Units Subcutaneous daily (Patient taking differently: Inject 21 Units subcutaneously daily.) 15 mL 3     No current facility-administered medications for this visit.        12/11/2024  1014 12/13/2024  1020 1/7/2025  1640 Most Recent Value   Temp: -- -- -- 98.3  F (36.8  C)  as of 3/18/2024   Pulse: 60 -- -- 60  as of 12/11/2024   BP: 124/85 -- -- 124/85  as of 12/11/2024   Resp: -- -- -- 15  as of 3/18/2024   SpO2: 90 % -- -- 90%  as of 12/11/2024   Body Mass Index:    None   Systolic (Patient  "Repo...: -- -- -- Not taken   Diastolic (Patient Rep...: -- -- -- Not taken   Height: -- -- -- 1.626 m (5' 4\")  as of 11/19/2024   Weight: 80.8 kg (178 lb 3.2 oz) --       On exam she is in no acute distress  Recent Labs   Lab Test 12/11/24  1036 03/18/24  1432 10/11/23  1055 03/21/23  1214 03/21/23  1123 03/22/22  1026 12/10/20  1000 12/10/20  0953   A1C 6.9* 7.6* 8.1*  --    < >  --    < > 8.0*   TSH 0.83  --  0.78  --   --   --   --   --    LDL  --   --   --   --   --   --   --  79   HDL  --   --   --   --   --   --   --  58   TRIG  --   --   --   --   --   --   --  90   CR 0.67 0.83 0.78  --    < >  --    < >  --    MICROL  --   --   --  <12.0  --  <5   < >  --     < > = values in this interval not displayed.       Assessment and plan:    1.  Diabetes control.  Her A1c has come down below 7.  She is now in her second trimester and no longer having nausea.  This allows her to eat a little bit better.  She has been having trouble targeting her morning glucose appropriately.  On the higher dose of Lantus she has lows at 3 in the morning but when she reduces the Lantus her fasting sugar is above 95.  I suggested we start Tresiba at the same dose.  I will send a prescription to her pharmacy.  Walking through her glucose data shows that she is not doing corrections if she is above target after meals.  I suggested she begin to do those corrections based on a 2-hour postmeal sugar.  If she is above 160 she should give herself a correction.  It also looks as if her noontime carb ratio is inadequate.  I suggested she increase it to 1 unit for 6 g from the 1 unit for 7 g she is using currently.  I reminded her that our targets are a fasting of 95, 1 hour postmeal glucose of 140 and a 2-hour postmeal glucose of 120.  She will let me know when she wants me to send a new prescription for the InPen to her pharmacy.    2.  Diabetes complications.  She has none.    Follow-up as scheduled every 2 weeks for the duration of her " pregnancy.    I spent 35 minutes on the video visit with the patient today (start time 5: 00 and end time 5: 3 5).  The visit was done at my office away from clinic.  On the same day of visit I spent an additional 15 minutes reviewing her CGM data, reviewing her lab and chart, and doing documentation.  Lola Butler MD      Again, thank you for allowing me to participate in the care of your patient.      Sincerely,    Lola Butler MD

## 2025-01-07 NOTE — PATIENT INSTRUCTIONS
Start Tresiba 19 units once a day.  Stop the Semglee when you start this.  If you have 3 fasting blood sugars in a row that are above 120, increase the Tresiba by 1 unit.  If you have 3 fasting sugars in a row that are below 90, reduce the Tresiba by 1 unit    When I sent in the prescription for the NovoLog pen, the computer said it was not covered by your pharmacy.  Therefore I sent a prescription for Humalog KwikPen.  You use the same doses as you would use for NovoLog.    Change your  carb ratio at lunch to 1 unit for every 6 g of carb    Let me know when we should send the prescription for the InPen.  The problem may have been the fact that it was written for NovoLog and not Humalog.    Change your correction dose to 1 unit to drop you 40 points with a target of 120    Look at your blood sugars 2 hours after your mealtime doses given.  If you are over 160, give yourself a correction dose

## 2025-01-13 DIAGNOSIS — E10.9 TYPE 1 DIABETES MELLITUS WITHOUT COMPLICATION (H): ICD-10-CM

## 2025-01-13 RX ORDER — INSULIN LISPRO 100 [IU]/ML
INJECTION, SOLUTION INTRAVENOUS; SUBCUTANEOUS
Qty: 15 ML | Refills: 2 | Status: SHIPPED | OUTPATIENT
Start: 2025-01-13

## 2025-01-20 DIAGNOSIS — E10.9 TYPE 1 DIABETES MELLITUS WITHOUT COMPLICATION (H): ICD-10-CM

## 2025-01-20 RX ORDER — INSULIN ASPART 100 [IU]/ML
INJECTION, SOLUTION INTRAVENOUS; SUBCUTANEOUS
Qty: 30 ML | Refills: 3 | OUTPATIENT
Start: 2025-01-20

## 2025-01-21 ENCOUNTER — VIRTUAL VISIT (OUTPATIENT)
Dept: EDUCATION SERVICES | Facility: CLINIC | Age: 30
End: 2025-01-21
Payer: COMMERCIAL

## 2025-01-21 DIAGNOSIS — O24.012 PRE-EXISTING TYPE 1 DIABETES MELLITUS DURING PREGNANCY IN SECOND TRIMESTER: Primary | ICD-10-CM

## 2025-01-21 NOTE — LETTER
"1/21/2025      Isaiah Jarquin  [address] [facility] [address]      Dear Colleague,    Thank you for referring your patient, Isaiah Jarquin, to the [facility]. Please see a copy of my visit note below.    Virtual Visit Details    Type of service:  Video Visit     Originating Location (pt. Location): Home    Distant Location (provider location):  Off-site  Platform used for Video Visit: [facility]    Diabetes Self-Management Training - T1 Pregnancy Complicated by Diabetes    SUBJECTIVE:  Isaiah Jarquin presents today for  education related to Pregnancy complicated by Diabetes  She is accompanied by self  Patient concerns: has no specific complaints, hasn't been able to get In Pen or switched to Tresiba    LMP 09/16/2024     Lab Results   Component Value Date     12/11/2024    GLC 82 03/22/2022     12/10/2020       History   Smoking Status     Never   Smokeless Tobacco     Never       SOCIO/ECONOMIC HISTORY:  Lives with: spouse  Recent family changes/social stressors: none noted  Language(s) spoken at home: English    ASSESSMENT:  Diagnosed with T1 2010  Due date 6/23/25  Previous pregnancies? No   Profession Advertising,  remote/on site Tues/Thurs  Prepregnancy weight 160  Height:  5'4\". Current weight: 178  Medical conditions T1   Medications:Lantus/Humalog, PNV  Past hospitalizations: None  Special dietary considerations (Judaism restrictions or food intolerance or allergies)    Exercise habits: walking pad an hour per day     Problems in current pregnancy: None     GLUCOSE DATA-Heriberto 3 (TIR  via AGP)          Medications reviewed and updated today.    SLEEP PATTERN:  Up 7am  Bedtime 10pm    NUTRITION:  Breakfast 8-9:30am: Eggs, toast, avocado, protein shake with banana  Lunch 12-1pm: Leftovers, salads with chickpeas, rice  Dinner 6-7pm: Protein, rice, potatoes, pasta, peppers, onions   Snacks: Peanuts, kiwi, popcorn, pretzels  Water " throughout day, diet soda on occasionally     Educational topics covered today:  Pathophysiology of diabetes in pregnancy, Risks and Complications, Blood Glucose Goals, When to Call a Diabetes Educator, Endocrinologist or OB Provider, CHO goals, importance of exercise, Insulin; onset/peak/duration, insulin does not cross placenta, insulin resistance, affect CHO has on glucose    ASSESSMENT:  T1 in pregnancy seen to review glucose. She is currently 18 weeks, 1 day. Will establish with MFM next week. Overall, feels good. Currently on Lantus 19 units and Humalog ICR 1:7 for breakfast and dinner, 1:6 for lunch + correction 2 hours after meals if >160. Tresiba was prescribed to help with overnight lows but prefers to finish out Lantus pen; overnight lows (with symptoms) seem to be improving. She feels lows in mid 70's, symptoms of shaky, frantic, treats with fruit snacks. TIR (70-10) 80% with 3% mild lows. Average glucose 136. Post prandial breakfast spikes and late evening rises. Was out of town this past weekend so CHO counting and timing of injection was not typical. She tries to bolus 15 minutes before meals. Walks on walking pad for an hour over the course of the day while working remote. She will contact [facility] regarding In Pen prescription, informed likely best to resend Rx to [facility] Cheney. She did not want to make any adjustments to regimen. Plan as noted below. Follow up in 2 weeks per patient.     PLAN:  *No changes to insulin doses  *Please have some protein before bed  *Aim for 30-45 grams of carbs for breakfast, 45-60 grams for lunch and dinner, 15-30 grams for snacks  *Walk for 10 minutes after meals  *GDM info    FOLLOW-UP:  Follow up with diabetes educator in 2 weeks    Time spent was 39 minutes  Encounter type: Individual    Anny Machado RN, [facility]  Diabetes Education Department  [facility], [place]    Any diabetes medication dose changes were made via the CDE Protocol  and Collaborative Practice Agreement with [facility].  A copy of this encounter was provided to patient's referring provider.      Again, thank you for allowing me to participate in the care of your patient.        Sincerely,        Anny Machado RN    Electronically signed

## 2025-01-21 NOTE — PROGRESS NOTES
"Virtual Visit Details    Type of service:  Video Visit     Originating Location (pt. Location): Home    Distant Location (provider location):  Off-site  Platform used for Video Visit: [employer]    Diabetes Self-Management Training - T1 Pregnancy Complicated by Diabetes    SUBJECTIVE:  Isaiah Jarquin presents today for  education related to Pregnancy complicated by Diabetes  She is accompanied by self  Patient concerns: has no specific complaints, hasn't been able to get In Pen or switched to Tresiba    LMP 09/16/2024     Lab Results   Component Value Date     12/11/2024    GLC 82 03/22/2022     12/10/2020       History   Smoking Status    Never   Smokeless Tobacco    Never       SOCIO/ECONOMIC HISTORY:  Lives with: spouse  Recent family changes/social stressors: none noted  Language(s) spoken at home: English    ASSESSMENT:  Diagnosed with T1 2010  Due date 6/23/25  Previous pregnancies? No   Profession Advertising,  remote/on site Tues/Thurs  Prepregnancy weight 160  Height:  5'4\". Current weight: 178  Medical conditions T1   Medications:Lantus/Humalog, PNV  Past hospitalizations: None  Special dietary considerations (Mormonism restrictions or food intolerance or allergies)    Exercise habits: walking pad an hour per day     Problems in current pregnancy: None     GLUCOSE DATA-Heriberto 3 (TIR  via AGP)          Medications reviewed and updated today.    SLEEP PATTERN:  Up 7am  Bedtime 10pm    NUTRITION:  Breakfast 8-9:30am: Eggs, toast, avocado, protein shake with banana  Lunch 12-1pm: Leftovers, salads with chickpeas, rice  Dinner 6-7pm: Protein, rice, potatoes, pasta, peppers, onions   Snacks: Peanuts, kiwi, popcorn, pretzels  Water throughout day, diet soda on occasionally     Educational topics covered today:  Pathophysiology of diabetes in pregnancy, Risks and Complications, Blood Glucose Goals, When to Call a Diabetes Educator, Endocrinologist or OB Provider, CHO goals, " importance of exercise, Insulin; onset/peak/duration, insulin does not cross placenta, insulin resistance, affect CHO has on glucose    ASSESSMENT:  T1 in pregnancy seen to review glucose. She is currently 18 weeks, 1 day. Will establish with MFM next week. Overall, feels good. Currently on Lantus 19 units and Humalog ICR 1:7 for breakfast and dinner, 1:6 for lunch + correction 2 hours after meals if >160. Tresiba was prescribed to help with overnight lows but prefers to finish out Lantus pen; overnight lows (with symptoms) seem to be improving. She feels lows in mid 70's, symptoms of shaky, frantic, treats with fruit snacks. TIR (70-10) 80% with 3% mild lows. Average glucose 136. Post prandial breakfast spikes and late evening rises. Was out of town this past weekend so CHO counting and timing of injection was not typical. She tries to bolus 15 minutes before meals. Walks on walking pad for an hour over the course of the day while working remote. She will contact [facility] regarding In Pen prescription, informed likely best to resend Rx to Patricia Cheney. She did not want to make any adjustments to regimen. Plan as noted below. Follow up in 2 weeks per patient.     PLAN:  *No changes to insulin doses  *Please have some protein before bed  *Aim for 30-45 grams of carbs for breakfast, 45-60 grams for lunch and dinner, 15-30 grams for snacks  *Walk for 10 minutes after meals  *GDM info    FOLLOW-UP:  Follow up with diabetes educator in 2 weeks    Time spent was 39 minutes  Encounter type: Individual    Anny Machado RN, [facility]  Diabetes Education Department  [facility], [place]    Any diabetes medication dose changes were made via the CDE Protocol and Collaborative Practice Agreement with [facility].  A copy of this encounter was provided to patient's referring provider.

## 2025-01-28 ENCOUNTER — HOSPITAL ENCOUNTER (OUTPATIENT)
Dept: ULTRASOUND IMAGING | Facility: CLINIC | Age: 30
Discharge: HOME | End: 2025-01-28
Attending: OBSTETRICS & GYNECOLOGY
Payer: COMMERCIAL

## 2025-01-28 ENCOUNTER — OFFICE VISIT (OUTPATIENT)
Dept: MATERNAL FETAL MEDICINE | Facility: CLINIC | Age: 30
End: 2025-01-28
Attending: OBSTETRICS & GYNECOLOGY
Payer: COMMERCIAL

## 2025-01-28 DIAGNOSIS — O24.012 TYPE 1 DIABETES MELLITUS DURING PREGNANCY IN SECOND TRIMESTER: Primary | ICD-10-CM

## 2025-01-28 DIAGNOSIS — Z36.2 ENCOUNTER FOR FOLLOW-UP ULTRASOUND OF FETAL ANATOMY: ICD-10-CM

## 2025-01-28 DIAGNOSIS — O44.40 LOW-LYING PLACENTA: ICD-10-CM

## 2025-01-28 DIAGNOSIS — O26.90 PREGNANCY RELATED CONDITION, ANTEPARTUM: ICD-10-CM

## 2025-01-28 PROCEDURE — 76817 TRANSVAGINAL US OBSTETRIC: CPT

## 2025-01-28 PROCEDURE — 99212 OFFICE O/P EST SF 10 MIN: CPT | Mod: 25 | Performed by: OBSTETRICS & GYNECOLOGY

## 2025-01-28 PROCEDURE — 76811 OB US DETAILED SNGL FETUS: CPT

## 2025-01-28 NOTE — NURSING NOTE
Education provided to patient on today's ultrasound.  SBAR given to COCO HANNAH, see their note in Epic.

## 2025-01-28 NOTE — PROGRESS NOTES
The patient was seen for an ultrasound in the Maternal[facility]Fetal Medicine Center at the [facility] today.  For a detailed report of the ultrasound examination, please see the ultrasound report which can be found under the imaging tab.    If you have questions regarding today's evaluation or if we can be of further service, please contact the Maternal-Fetal Medicine Center.    Telma Norris MD  , OB/GYN  Maternal-Fetal Medicine  [phone] (Pager)

## 2025-02-06 NOTE — PROGRESS NOTES
Outcome for 25 11:46 AM: Data uploaded on [employer]  Bobbilynn Grossaint, VF  Outcome for 25 7:32 AM: Data obtained via [employer] website  Evelin Valdovinos MA      This 29-year-old woman seen for f/u of her type 1 diabetes.  She was diagnosed in . She is now pregnant. She is  and  has an  LIZZ: 2025, by Last Menstrual Period.  She is currently about 22 weeks along.     She currently is taking tresiba 19 units each day.  She switched to Tresiba from Lantus after our last visit.  She likes it a lot better.  She no longer has hypoglycemia overnight.  She takes NovoLog 6 unit for 7 g of carb and a correction of 1 unit to drop her 40 blood sugars over 140.  She feels very confident with carb counting.  She generally tries to bolus 15 minutes before her meals.  She wears her perla sensor and the data are below.  She recognizes her hypoglycemia without difficulty.  She has been a bit frustrated with how high her sugars go after some meals, particularly when she has eaten Chinese food.  She is working very hard to cover her meals.  She feels well but is tired.  She finds it challenging to do so to so many medical visits.  She is due to see her eye doctor next month.  She has no vision problems now.  She has no chest pain.  She has no paresthesias.  She continues to eat breakfast lunch and dinner and avoids snacks.  One of the nurse educators recommended she start with an InPen device.  She asked for a prescription today            Current Outpatient Medications   Medication Sig Dispense Refill    blood glucose (NO BRAND SPECIFIED) lancets standard Use to test blood sugar 4 times daily or as directed. 100 each 4    blood glucose (NO BRAND SPECIFIED) test strip Use to test blood sugar 3 times daily or as directed. 50 strip 11    Continuous Glucose Sensor (FREESTYLE PERLA 3 PLUS SENSOR) MISC Use 1 sensor every 15 days. Use to read blood sugars per 's instructions. 6 each 3    Injection Device for  insulin (INPEN 100-GREY-NOVOLOG-FIASP) RAJIV 1 each daily. 1 each 1    insulin aspart (NOVOPEN ECHO) 100 UNIT/ML cartridge Use 1 unit for 6 grams of carb and 1 unit to drop 40 over 150 up to 50 units a day 12 mL 3    insulin degludec (TRESIBA FLEXTOUCH) 100 UNIT/ML pen Inject 19 Units subcutaneously daily. 15 mL 3    INSULIN GLARGINE 100 UNIT/ML pen INJECT 23 UNITS SUBCUTANEOUS AT BEDTIME      insulin lispro (HUMALOG KWIKPEN) 100 UNIT/ML (1 unit dial) KWIKPEN Use  1 unit for 6 g CHO with meals  up to 30 units a day 15 mL 2    insulin pen needle (32G X 4 MM) 32G X 4 MM miscellaneous Use 5 pen needles daily or as directed. 400 each 3    NOVOLOG FLEXPEN 100 UNIT/ML soln Use 1 unit for 7 grams up to total of 30 units per day 30 mL 3    SEMGLEE, YFGN, 100 UNIT/ML SOPN Inject 23 Units Subcutaneous daily (Patient taking differently: Inject 21 Units subcutaneously daily.) 15 mL 3     No current facility-administered medications for this visit.                1003 Most Recent Value      Temp: -- -- 98.3  F (36.8  C)  as of 3/18/2024    Pulse: -- 77 77  as of 1/28/2025    BP: -- 107/75 107/75  as of 1/28/2025    Resp: -- -- 15  as of 3/18/2024    SpO2: -- 98 % 98%  as of 1/28/2025      On exam she is in no acute distress.    Recent Labs   Lab Test 12/11/24  1036 03/18/24  1432 10/11/23  1055 03/21/23  1214 03/21/23  1123 03/22/22  1026 12/10/20  1000 12/10/20  0953   A1C 6.9* 7.6* 8.1*  --    < >  --    < > 8.0*   TSH 0.83  --  0.78  --   --   --   --   --    LDL  --   --   --   --   --   --   --  79   HDL  --   --   --   --   --   --   --  58   TRIG  --   --   --   --   --   --   --  90   CR 0.67 0.83 0.78  --    < >  --    < >  --    MICROL  --   --   --  <12.0  --  <5   < >  --     < > = values in this interval not displayed.      Latest Reference Range & Units 12/11/24 10:36   Sodium 135 - 145 mmol/L 136   Potassium 3.4 - 5.3 mmol/L 4.2   Chloride 98 - 107 mmol/L 102   Carbon Dioxide (CO2) 22 - 29 mmol/L 24   Urea Nitrogen  6.0 - 20.0 mg/dL 10.9   Creatinine 0.51 - 0.95 mg/dL 0.67   GFR Estimate >60 mL/min/1.73m2 >90   Calcium 8.8 - 10.4 mg/dL 9.6   Anion Gap 7 - 15 mmol/L 10   Albumin 3.5 - 5.2 g/dL 4.3   Protein Total 6.4 - 8.3 g/dL 6.9   Alkaline Phosphatase 40 - 150 U/L 69   ALT 0 - 50 U/L 16   AST 0 - 45 U/L 20   Bilirubin Total <=1.2 mg/dL 0.3   Glucose 70 - 99 mg/dL 149 (H)   Estimated Average Glucose <117 mg/dL 151 (H)   Hemoglobin A1C 0.0 - 5.6 % 6.9 (H)   TSH 0.30 - 4.20 uIU/mL 0.83   (H): Data is abnormally high      Assessment and plan:    1.  Diabetes control.  Her last A1c looked good.  I will repeat the value.  Based on her glucose records, her fastings are actually a little bit lower than they need to be.  I will reduce this which will allow me to be more aggressive with her carb ratios.  I will start by changing her breakfast carb ratio to 1 unit for 5 g.  If she is not having hypoglycemia and continues to have higher sugars after lunch and dinner, we will increase those carb ratios as well.  She will continue to have follow-up every couple of weeks with the team.  I will send a prescription for the InPen to her pharmacy.    2.  Diabetes complications.  Renal functions fine.  She is going to see the eye doctor next week.  She has no foot problems.      I spent 25 minutes on the video visit with the patient today (start time 4: 30 and 4: 5 5).  The visit was done at my off is away from clinic.  I spent an additional 5 minutes reviewing medical records, placing orders, coordinating care and documenting in the EHR, as detailed above, exclusive of CGM time.    Lola Butler MD

## 2025-02-18 ENCOUNTER — VIRTUAL VISIT (OUTPATIENT)
Dept: ENDOCRINOLOGY | Facility: CLINIC | Age: 30
End: 2025-02-18
Payer: COMMERCIAL

## 2025-02-18 DIAGNOSIS — O24.012 PREGNANCY COMPLICATED BY PRE-EXISTING TYPE 1 DIABETES IN SECOND TRIMESTER: Primary | ICD-10-CM

## 2025-02-18 PROCEDURE — 98006 SYNCH AUDIO-VIDEO EST MOD 30: CPT | Performed by: INTERNAL MEDICINE

## 2025-02-18 RX ORDER — INSULIN PEN,REUSABLE,BT LISPRO
1 INSULIN PEN (EA) SUBCUTANEOUS DAILY
Qty: 1 EACH | Refills: 3 | Status: SHIPPED | OUTPATIENT
Start: 2025-02-18

## 2025-02-18 NOTE — PROGRESS NOTES
"Virtual Visit Details    Type of service:  Video Visit     Originating Location (pt. Location): {video visit patient location:638386::\"Home\"}  {PROVIDER LOCATION On-site should be selected for visits conducted from your clinic location or adjoining [facility] hospital, academic office, or other nearby [facility] building. Off-site should be selected for all other provider locations, including home:[id number]}  Distant Location (provider location):  {virtual location provider:[id number]}  Platform used for Video Visit: {Virtual Visit Platforms:053853::\"[employer]\"}    "

## 2025-02-18 NOTE — NURSING NOTE
Current patient location: [address] [facility] [address]    Is the patient currently in the state of [place]? YES    Visit mode: VIDEO    If the visit is dropped, the patient can be reconnected by:VIDEO VISIT: Text to cell phone:   Telephone Information:   Mobile [phone]       Will anyone else be joining the visit? NO  (If patient encounters technical issues they should call [phone]    Are changes needed to the allergy or medication list? No and Pt stated no med changes    Are refills needed on medications prescribed by this physician? NO    Rooming Documentation:  Not applicable    Reason for visit: JACOB HOUSTON

## 2025-02-18 NOTE — LETTER
2025       RE: Isaiah Jarquin  [address] [address]     Dear Colleague,    Thank you for referring your patient, Isaiah Jarquin, to the [facility]OCRINOLOG[facility] [place] at [facility]. Please see a copy of my visit note below.    Outcome for 25 11:46 AM: Data uploaded on [employer]  Bobbilynn Grossaint, VF  Outcome for 25 7:32 AM: Data obtained via [employer] website  Evelin Valdovinos MA      This 29-year-old woman seen for f/u of her type 1 diabetes.  She was diagnosed in . She is now pregnant. She is  and  has an  LIZZ: 2025, by Last Menstrual Period.  She is currently about 22 weeks along.     She currently is taking tresiba 19 units each day.  She switched to Tresiba from Lantus after our last visit.  She likes it a lot better.  She no longer has hypoglycemia overnight.  She takes NovoLog 6 unit for 7 g of carb and a correction of 1 unit to drop her 40 blood sugars over 140.  She feels very confident with carb counting.  She generally tries to bolus 15 minutes before her meals.  She wears her perla sensor and the data are below.  She recognizes her hypoglycemia without difficulty.  She has been a bit frustrated with how high her sugars go after some meals, particularly when she has eaten Chinese food.  She is working very hard to cover her meals.  She feels well but is tired.  She finds it challenging to do so to so many medical visits.  She is due to see her eye doctor next month.  She has no vision problems now.  She has no chest pain.  She has no paresthesias.  She continues to eat breakfast lunch and dinner and avoids snacks.  One of the nurse educators recommended she start with an InPen device.  She asked for a prescription today            Current Outpatient Medications   Medication Sig Dispense Refill     blood glucose (NO BRAND SPECIFIED) lancets standard Use to test blood sugar 4 times daily or  as directed. 100 each 4     blood glucose (NO BRAND SPECIFIED) test strip Use to test blood sugar 3 times daily or as directed. 50 strip 11     Continuous Glucose Sensor (FREESTYLE BIGG 3 PLUS SENSOR) MISC Use 1 sensor every 15 days. Use to read blood sugars per 's instructions. 6 each 3     Injection Device for insulin (INPEN 100-GREY-NOVOLOG-FIASP) RAJIV 1 each daily. 1 each 1     insulin aspart (NOVOPEN ECHO) 100 UNIT/ML cartridge Use 1 unit for 6 grams of carb and 1 unit to drop 40 over 150 up to 50 units a day 12 mL 3     insulin degludec (TRESIBA FLEXTOUCH) 100 UNIT/ML pen Inject 19 Units subcutaneously daily. 15 mL 3     INSULIN GLARGINE 100 UNIT/ML pen INJECT 23 UNITS SUBCUTANEOUS AT BEDTIME       insulin lispro (HUMALOG KWIKPEN) 100 UNIT/ML (1 unit dial) KWIKPEN Use  1 unit for 6 g CHO with meals  up to 30 units a day 15 mL 2     insulin pen needle (32G X 4 MM) 32G X 4 MM miscellaneous Use 5 pen needles daily or as directed. 400 each 3     NOVOLOG FLEXPEN 100 UNIT/ML soln Use 1 unit for 7 grams up to total of 30 units per day 30 mL 3     SEMGLEE, YFGN, 100 UNIT/ML SOPN Inject 23 Units Subcutaneous daily (Patient taking differently: Inject 21 Units subcutaneously daily.) 15 mL 3     No current facility-administered medications for this visit.                1003 Most Recent Value      Temp: -- -- 98.3  F (36.8  C)  as of 3/18/2024    Pulse: -- 77 77  as of 1/28/2025    BP: -- 107/75 107/75  as of 1/28/2025    Resp: -- -- 15  as of 3/18/2024    SpO2: -- 98 % 98%  as of 1/28/2025      On exam she is in no acute distress.    Recent Labs   Lab Test 12/11/24  1036 03/18/24  1432 10/11/23  1055 03/21/23  1214 03/21/23  1123 03/22/22  1026 12/10/20  1000 12/10/20  0953   A1C 6.9* 7.6* 8.1*  --    < >  --    < > 8.0*   TSH 0.83  --  0.78  --   --   --   --   --    LDL  --   --   --   --   --   --   --  79   HDL  --   --   --   --   --   --   --  58   TRIG  --   --   --   --   --   --   --  90   CR 0.67  0.83 0.78  --    < >  --    < >  --    MICROL  --   --   --  <12.0  --  <5   < >  --     < > = values in this interval not displayed.      Latest Reference Range & Units 12/11/24 10:36   Sodium 135 - 145 mmol/L 136   Potassium 3.4 - 5.3 mmol/L 4.2   Chloride 98 - 107 mmol/L 102   Carbon Dioxide (CO2) 22 - 29 mmol/L 24   Urea Nitrogen 6.0 - 20.0 mg/dL 10.9   Creatinine 0.51 - 0.95 mg/dL 0.67   GFR Estimate >60 mL/min/1.73m2 >90   Calcium 8.8 - 10.4 mg/dL 9.6   Anion Gap 7 - 15 mmol/L 10   Albumin 3.5 - 5.2 g/dL 4.3   Protein Total 6.4 - 8.3 g/dL 6.9   Alkaline Phosphatase 40 - 150 U/L 69   ALT 0 - 50 U/L 16   AST 0 - 45 U/L 20   Bilirubin Total <=1.2 mg/dL 0.3   Glucose 70 - 99 mg/dL 149 (H)   Estimated Average Glucose <117 mg/dL 151 (H)   Hemoglobin A1C 0.0 - 5.6 % 6.9 (H)   TSH 0.30 - 4.20 uIU/mL 0.83   (H): Data is abnormally high      Assessment and plan:    1.  Diabetes control.  Her last A1c looked good.  I will repeat the value.  Based on her glucose records, her fastings are actually a little bit lower than they need to be.  I will reduce this which will allow me to be more aggressive with her carb ratios.  I will start by changing her breakfast carb ratio to 1 unit for 5 g.  If she is not having hypoglycemia and continues to have higher sugars after lunch and dinner, we will increase those carb ratios as well.  She will continue to have follow-up every couple of weeks with the team.  I will send a prescription for the InPen to her pharmacy.    2.  Diabetes complications.  Renal functions fine.  She is going to see the eye doctor next week.  She has no foot problems.      I spent 25 minutes on the video visit with the patient today (start time 4: 30 and 4: 5 5).  The visit was done at my off is away from clinic.  I spent an additional 5 minutes reviewing medical records, placing orders, coordinating care and documenting in the EHR, as detailed above, exclusive of CGM time.    Lola Butler  MD        Again, thank you for allowing me to participate in the care of your patient.      Sincerely,    Lola Butler MD

## 2025-02-18 NOTE — PATIENT INSTRUCTIONS
Reduce the dose of Tresiba to 17 units.    Continue to take your current doses of Humalog the day after you reduce the Tresiba.  2 days after you reduce the Tresiba, change the carb ratio at breakfast to 1 unit for 5 g.  Over the next couple of days look at how high you get after lunch and dinner.  If you still are above 140 one hour after these meals, change the carb ratio at lunch and dinner to 1 unit for 5 g as well.  Let me know if you start having hypoglycemia before lunch and dinner.    Have your A1c done at your convenience.Please contact us to schedule at any of our [employer] lab locations  Call [phone] ([phone]), select option 1

## 2025-02-19 ENCOUNTER — MYC REFILL (OUTPATIENT)
Dept: FAMILY MEDICINE | Facility: CLINIC | Age: 30
End: 2025-02-19
Payer: COMMERCIAL

## 2025-02-19 DIAGNOSIS — Z33.1 PREGNANCY, INCIDENTAL: ICD-10-CM

## 2025-02-19 DIAGNOSIS — E10.9 TYPE 1 DIABETES MELLITUS WITHOUT COMPLICATION (H): ICD-10-CM

## 2025-02-19 RX ORDER — HYDROCHLOROTHIAZIDE 12.5 MG/1
CAPSULE ORAL
Qty: 6 EACH | Refills: 3 | Status: SHIPPED | OUTPATIENT
Start: 2025-02-19

## 2025-02-25 ENCOUNTER — PRENATAL OFFICE VISIT (OUTPATIENT)
Dept: OBGYN | Facility: CLINIC | Age: 30
End: 2025-02-25
Payer: COMMERCIAL

## 2025-02-25 VITALS
WEIGHT: 185 LBS | SYSTOLIC BLOOD PRESSURE: 126 MMHG | HEART RATE: 90 BPM | OXYGEN SATURATION: 97 % | DIASTOLIC BLOOD PRESSURE: 77 MMHG | BODY MASS INDEX: 31.76 KG/M2

## 2025-02-25 DIAGNOSIS — O24.012 PREGNANCY COMPLICATED BY PRE-EXISTING TYPE 1 DIABETES IN SECOND TRIMESTER: ICD-10-CM

## 2025-02-25 DIAGNOSIS — O09.91 ENCOUNTER FOR SUPERVISION OF HIGH RISK PREGNANCY IN FIRST TRIMESTER, ANTEPARTUM: Primary | ICD-10-CM

## 2025-02-25 LAB
ERYTHROCYTE [DISTWIDTH] IN BLOOD BY AUTOMATED COUNT: 12.6 % (ref 10–15)
EST. AVERAGE GLUCOSE BLD GHB EST-MCNC: 146 MG/DL
HBA1C MFR BLD: 6.7 % (ref 0–5.6)
HCT VFR BLD AUTO: 36.6 % (ref 35–47)
HGB BLD-MCNC: 12.4 G/DL (ref 11.7–15.7)
MCH RBC QN AUTO: 28.8 PG (ref 26.5–33)
MCHC RBC AUTO-ENTMCNC: 33.9 G/DL (ref 31.5–36.5)
MCV RBC AUTO: 85 FL (ref 78–100)
PLATELET # BLD AUTO: 282 10E3/UL (ref 150–450)
RBC # BLD AUTO: 4.31 10E6/UL (ref 3.8–5.2)
WBC # BLD AUTO: 10.3 10E3/UL (ref 4–11)

## 2025-02-25 PROCEDURE — 3078F DIAST BP <80 MM HG: CPT | Performed by: OBSTETRICS & GYNECOLOGY

## 2025-02-25 PROCEDURE — 83036 HEMOGLOBIN GLYCOSYLATED A1C: CPT | Performed by: OBSTETRICS & GYNECOLOGY

## 2025-02-25 PROCEDURE — 36415 COLL VENOUS BLD VENIPUNCTURE: CPT | Performed by: OBSTETRICS & GYNECOLOGY

## 2025-02-25 PROCEDURE — 0502F SUBSEQUENT PRENATAL CARE: CPT | Performed by: OBSTETRICS & GYNECOLOGY

## 2025-02-25 PROCEDURE — 85027 COMPLETE CBC AUTOMATED: CPT | Performed by: OBSTETRICS & GYNECOLOGY

## 2025-02-25 PROCEDURE — 99207 PR PRENATAL VISIT: CPT | Performed by: OBSTETRICS & GYNECOLOGY

## 2025-02-25 PROCEDURE — 3074F SYST BP LT 130 MM HG: CPT | Performed by: OBSTETRICS & GYNECOLOGY

## 2025-02-25 RX ORDER — ASPIRIN 81 MG/1
81 TABLET ORAL DAILY
COMMUNITY

## 2025-02-25 NOTE — PROGRESS NOTES
Return OB visit    Subjective:  Patient reports active fetal movement, no vaginal bleeding or leaking fluid. She denies contractions. She has no concerns today ***.       Objective:  /77 (BP Location: Left arm, Patient Position: Sitting, Cuff Size: Adult Regular)   Pulse 90   Wt 83.9 kg (185 lb)   LMP 2024   SpO2 97%   BMI 31.76 kg/m     See OB flow sheet    Assessment and Plan    Isaiah Jarquin is a 29 year old  at 23w1d here for PAULA visit, pregnancy complicated by***    This visit:  GTT results: ***  Hemoglobin:***   Rhogam:***  Tdap: ***  Childbirth classes? {:550101}  Plan on breastfeeding? Yes: ***  Birthcontrol? {:751916}  Sex on ultrasound? boy  Circumsion? Yes  Peds doc? ***    Next visit:  -***    RTC in *** weeks

## 2025-02-25 NOTE — PROGRESS NOTES
Return OB visit    Subjective:  Patient reports active fetal movement, no vaginal bleeding or leaking fluid. She denies contractions. She has no concerns today. She saw endocrine on  and overall glycemic control has been very good but still tweaking her long acting and carb coverage due to some elevated post-prandial sugars.        Objective:  /77 (BP Location: Left arm, Patient Position: Sitting, Cuff Size: Adult Regular)   Pulse 90   Wt 83.9 kg (185 lb)   LMP 2024   SpO2 97%   BMI 31.76 kg/m     See OB flow sheet    Assessment and Plan    Isaiah Jarquin is a 29 year old  at 23w1d here for PAULA visit, pregnancy complicated by Type I DM     This visit:  Childbirth classes? YES, planning on it  Plan on breastfeeding? Yes: not ready to order pump yet but aware she can either get it from the hospital or we can give her an Rx   Birthcontrol? POPs, not interested in LARC   Sex on ultrasound? boy  Circumsion? Yes  Peds doc? Working to find one     CBC and A1c drawn today     Next visit:  -Tdap     RTC in 4 weeks or sooner BELINDA Vaughn MD

## 2025-02-28 ENCOUNTER — HOSPITAL ENCOUNTER (OUTPATIENT)
Dept: CARDIOLOGY | Facility: CLINIC | Age: 30
Discharge: HOME | End: 2025-02-28
Payer: COMMERCIAL

## 2025-02-28 DIAGNOSIS — O26.90 PREGNANCY RELATED CONDITION, ANTEPARTUM: ICD-10-CM

## 2025-02-28 PROCEDURE — 93325 DOPPLER ECHO COLOR FLOW MAPG: CPT

## 2025-02-28 PROCEDURE — 76825 ECHO EXAM OF FETAL HEART: CPT | Mod: 26 | Performed by: PEDIATRICS

## 2025-02-28 PROCEDURE — 93325 DOPPLER ECHO COLOR FLOW MAPG: CPT | Mod: 26 | Performed by: PEDIATRICS

## 2025-02-28 PROCEDURE — 76827 ECHO EXAM OF FETAL HEART: CPT | Mod: 26 | Performed by: PEDIATRICS

## 2025-03-04 ENCOUNTER — VIRTUAL VISIT (OUTPATIENT)
Dept: EDUCATION SERVICES | Facility: CLINIC | Age: 30
End: 2025-03-04
Payer: COMMERCIAL

## 2025-03-04 DIAGNOSIS — O24.012 PRE-EXISTING TYPE 1 DIABETES MELLITUS DURING PREGNANCY IN SECOND TRIMESTER: Primary | ICD-10-CM

## 2025-03-04 PROCEDURE — 99207 PR NO CHARGE NURSE ONLY: CPT | Performed by: DIETITIAN, REGISTERED

## 2025-03-04 PROCEDURE — G0108 DIAB MANAGE TRN  PER INDIV: HCPCS | Mod: 95 | Performed by: DIETITIAN, REGISTERED

## 2025-03-04 NOTE — LETTER
3/4/2025         RE: Isaiah Jarquin  [address] [address]        Dear Colleague,    Thank you for referring your patient, Isaiah Jarquin, to the [facility]. Please see a copy of my visit note below.    Diabetes Self-Management Education & Support    Presents for: Pre-Existing Diabetes and Pregnancy    Type of service:  Video Visit    If the video visit is dropped, the video visit invitation should be resent by: Send to e-mail at: [email or web address]    Originating Location (pt. Location): Home  Distant Location (provider location): Offsite  Mode of Communication:  Video Conference via [employer]    Video Start Time:  3:00  Video End Time (time video stopped): 3:30    How would patient like to obtain AVS? Criseldat      Reports:            Continue education with the following diabetes management concepts: Problem Solving      Assessment    Pt with T1DM and 24 weeks pregnant. Pt is comfortable with self adjustment of insulin, but is noting difficulty with post meal hyperglycemia. We did discuss injection timing as BG often appears to sharply increase after meals and then sharply decrease. Pt tries to take injection 15 min before meals, but may benefit by increasing to 20 min or longer if needed. Pt agreeable.   The InPen has been ordered, but pt has not received yet. Advised pt to call pharmacy to check status. Pt not interested in pump therapy.   Answered all questions. Pt plans to continue follow up with [facility] (Dr. Decker).     Opportunities for ongoing education and support in diabetes-self management were discussed.    PLAN:  CDE follow up as needed, per pt.     Topics to cover at upcoming visits: Problem Solving    See Care Plan for co-developed, patient-stated behavior change goals.      Education Materials Provided:  No new materials provided today    Subjective/Objective  Accompanied by: Self  Diabetes education in the past 24mo: Yes  Focus of  "Visit: Other  Diabetes type: Type 1  How confident are you filling out medical forms by yourself:: Extremely  Other concerns:: None  Cultural Influences/Ethnic Background:  Choose not to answer      Diabetes Symptoms & Complications     Complications assessed today?: No    Patient Problem List and Family Medical History reviewed for relevant medical history, current medical status, and diabetes risk factors.    Vitals:  LMP 09/16/2024       Wt Readings from Last 3 Encounters:   02/25/25 83.9 kg (185 lb)   01/28/25 82.6 kg (182 lb)   12/11/24 80.8 kg (178 lb 3.2 oz)       Estimated Date of Delivery: Jun 23, 2025    Labs:  Lab Results   Component Value Date    A1C 6.7 02/25/2025    A1C 8.0 12/10/2020     Lab Results   Component Value Date     12/11/2024    GLC 82 03/22/2022     12/10/2020     Lab Results   Component Value Date    LDL 79 12/10/2020     HDL Cholesterol   Date Value Ref Range Status   12/10/2020 58 >49 mg/dL Final   ]  GFR Estimate   Date Value Ref Range Status   12/11/2024 >90 >60 mL/min/1.73m2 Final     Comment:     eGFR calculated using 2021 CKD-EPI equation.   12/10/2020 >90 >60 mL/min/[1.73_m2] Final     Comment:     Non  GFR Calc  Starting 12/18/2018, serum creatinine based estimated GFR (eGFR) will be   calculated using the Chronic Kidney Disease Epidemiology Collaboration   (CKD-EPI) equation.       GFR Estimate If Black   Date Value Ref Range Status   12/10/2020 >90 >60 mL/min/[1.73_m2] Final     Comment:      GFR Calc  Starting 12/18/2018, serum creatinine based estimated GFR (eGFR) will be   calculated using the Chronic Kidney Disease Epidemiology Collaboration   (CKD-EPI) equation.       Lab Results   Component Value Date    CR 0.67 12/11/2024    CR 0.78 12/10/2020     No results found for: \"MICROALBUMIN\"    Last 3 BP:   BP Readings from Last 3 Encounters:   02/25/25 126/77   01/28/25 107/75   12/11/24 124/85       History   Smoking Status     " Never   Smokeless Tobacco     Never       Healthy Eating:  Healthy Eating Assessed Today: Yes  Meal planning/habits: Carb counting  Who cooks/prepares meals for you?: Self  Has patient met with a dietitian in the past?: Yes      Monitoring:  Monitoring Assessed Today: Yes  Blood Glucose Meter: CGM    Taking Medications:  Diabetes Medication(s)       Insulin       insulin aspart (NOVOPEN ECHO) 100 UNIT/ML cartridge Use 1 unit for 6 grams of carb and 1 unit to drop 40 over 150 up to 50 units a day     insulin degludec (TRESIBA FLEXTOUCH) 100 UNIT/ML pen Inject 19 Units subcutaneously daily.     INSULIN GLARGINE 100 UNIT/ML pen INJECT 23 UNITS SUBCUTANEOUS AT BEDTIME     insulin lispro (HUMALOG KWIKPEN) 100 UNIT/ML (1 unit dial) KWIKPEN Use  1 unit for 6 g CHO with meals  up to 30 units a day     insulin lispro (HUMALOG PENFILL) 100 UNIT/ML Cartridge Directed up to 40 units a day.     NOVOLOG FLEXPEN 100 UNIT/ML soln Use 1 unit for 7 grams up to total of 30 units per day     SEMGLEE, YFGN, 100 UNIT/ML SOPN Inject 23 Units Subcutaneous daily     Patient taking differently: Inject 21 Units subcutaneously daily.          Taking Medication Assessed Today: Yes  Current Treatments: Insulin Injections  Dose schedule: Pre-breakfast, Pre-lunch, Pre-dinner, At bedtime, Other  Given by: Patient  Problems taking diabetes medications regularly?: No    Problem Solving:  Problem Solving Assessed Today: Yes  Is the patient at risk for hypoglycemia?: Yes  Hypoglycemia Frequency: Other  Hypoglycemia Treatment: Other food    Any changes to above medications since becoming pregnant: insulin increase as needed    Taking Medication Assessed Today: Yes  Current Treatments: Insulin Injections  Dose schedule: Pre-breakfast, Pre-lunch, Pre-dinner, At bedtime, Other  Given by: Patient  Problems taking diabetes medications regularly?: No      Care Plan and Education Provided:  Problem Solving: High glucose - causes, signs/symptoms, treatment and  prevention and Low glucose - causes, signs/symptoms, treatment and prevention    Heidi Bethea RD  Time Spent: 30 minutes  Encounter Type: Individual    Diabetes medication dose changes were made via the CDCES Standing Orders under the patient's referring provider.

## 2025-03-04 NOTE — PROGRESS NOTES
Diabetes Self-Management Education & Support    Presents for: Pre-Existing Diabetes and Pregnancy    Type of service:  Video Visit    If the video visit is dropped, the video visit invitation should be resent by: Send to e-mail at: [email or web address]    Originating Location (pt. Location): Home  Distant Location (provider location): Offsite  Mode of Communication:  Video Conference via [employer]    Video Start Time:  3:00  Video End Time (time video stopped): 3:30    How would patient like to obtain AVS? Criseldat      Reports:            Continue education with the following diabetes management concepts: Problem Solving      Assessment    Pt with T1DM and 24 weeks pregnant. Pt is comfortable with self adjustment of insulin, but is noting difficulty with post meal hyperglycemia. We did discuss injection timing as BG often appears to sharply increase after meals and then sharply decrease. Pt tries to take injection 15 min before meals, but may benefit by increasing to 20 min or longer if needed. Pt agreeable.   The InPen has been ordered, but pt has not received yet. Advised pt to call pharmacy to check status. Pt not interested in pump therapy.   Answered all questions. Pt plans to continue follow up with Endo (Dr. Decker).     Opportunities for ongoing education and support in diabetes-self management were discussed.    PLAN:  CDE follow up as needed, per pt.     Topics to cover at upcoming visits: Problem Solving    See Care Plan for co-developed, patient-stated behavior change goals.      Education Materials Provided:  No new materials provided today    Subjective/Objective  Accompanied by: Self  Diabetes education in the past 24mo: Yes  Focus of Visit: Other  Diabetes type: Type 1  How confident are you filling out medical forms by yourself:: Extremely  Other concerns:: None  Cultural Influences/Ethnic Background:  Choose not to answer      Diabetes Symptoms & Complications     Complications assessed  "today?: No    Patient Problem List and Family Medical History reviewed for relevant medical history, current medical status, and diabetes risk factors.    Vitals:  LMP 09/16/2024       Wt Readings from Last 3 Encounters:   02/25/25 83.9 kg (185 lb)   01/28/25 82.6 kg (182 lb)   12/11/24 80.8 kg (178 lb 3.2 oz)       Estimated Date of Delivery: Jun 23, 2025    Labs:  Lab Results   Component Value Date    A1C 6.7 02/25/2025    A1C 8.0 12/10/2020     Lab Results   Component Value Date     12/11/2024    GLC 82 03/22/2022     12/10/2020     Lab Results   Component Value Date    LDL 79 12/10/2020     HDL Cholesterol   Date Value Ref Range Status   12/10/2020 58 >49 mg/dL Final   ]  GFR Estimate   Date Value Ref Range Status   12/11/2024 >90 >60 mL/min/1.73m2 Final     Comment:     eGFR calculated using 2021 CKD-EPI equation.   12/10/2020 >90 >60 mL/min/[1.73_m2] Final     Comment:     Non  GFR Calc  Starting 12/18/2018, serum creatinine based estimated GFR (eGFR) will be   calculated using the Chronic Kidney Disease Epidemiology Collaboration   (CKD-EPI) equation.       GFR Estimate If Black   Date Value Ref Range Status   12/10/2020 >90 >60 mL/min/[1.73_m2] Final     Comment:      GFR Calc  Starting 12/18/2018, serum creatinine based estimated GFR (eGFR) will be   calculated using the Chronic Kidney Disease Epidemiology Collaboration   (CKD-EPI) equation.       Lab Results   Component Value Date    CR 0.67 12/11/2024    CR 0.78 12/10/2020     No results found for: \"MICROALBUMIN\"    Last 3 BP:   BP Readings from Last 3 Encounters:   02/25/25 126/77   01/28/25 107/75   12/11/24 124/85       History   Smoking Status    Never   Smokeless Tobacco    Never       Healthy Eating:  Healthy Eating Assessed Today: Yes  Meal planning/habits: Carb counting  Who cooks/prepares meals for you?: Self  Has patient met with a dietitian in the past?: Yes      Monitoring:  Monitoring Assessed " Today: Yes  Blood Glucose Meter: CGM    Taking Medications:  Diabetes Medication(s)       Insulin       insulin aspart (NOVOPEN ECHO) 100 UNIT/ML cartridge Use 1 unit for 6 grams of carb and 1 unit to drop 40 over 150 up to 50 units a day     insulin degludec (TRESIBA FLEXTOUCH) 100 UNIT/ML pen Inject 19 Units subcutaneously daily.     INSULIN GLARGINE 100 UNIT/ML pen INJECT 23 UNITS SUBCUTANEOUS AT BEDTIME     insulin lispro (HUMALOG KWIKPEN) 100 UNIT/ML (1 unit dial) KWIKPEN Use  1 unit for 6 g CHO with meals  up to 30 units a day     insulin lispro (HUMALOG PENFILL) 100 UNIT/ML Cartridge Directed up to 40 units a day.     NOVOLOG FLEXPEN 100 UNIT/ML soln Use 1 unit for 7 grams up to total of 30 units per day     SEMGLEE, YFGN, 100 UNIT/ML SOPN Inject 23 Units Subcutaneous daily     Patient taking differently: Inject 21 Units subcutaneously daily.          Taking Medication Assessed Today: Yes  Current Treatments: Insulin Injections  Dose schedule: Pre-breakfast, Pre-lunch, Pre-dinner, At bedtime, Other  Given by: Patient  Problems taking diabetes medications regularly?: No    Problem Solving:  Problem Solving Assessed Today: Yes  Is the patient at risk for hypoglycemia?: Yes  Hypoglycemia Frequency: Other  Hypoglycemia Treatment: Other food    Any changes to above medications since becoming pregnant: insulin increase as needed    Taking Medication Assessed Today: Yes  Current Treatments: Insulin Injections  Dose schedule: Pre-breakfast, Pre-lunch, Pre-dinner, At bedtime, Other  Given by: Patient  Problems taking diabetes medications regularly?: No      Care Plan and Education Provided:  Problem Solving: High glucose - causes, signs/symptoms, treatment and prevention and Low glucose - causes, signs/symptoms, treatment and prevention    Heidi Bethea RD  Time Spent: 30 minutes  Encounter Type: Individual    Diabetes medication dose changes were made via the CDCES Standing Orders under the patient's referring  provider.

## 2025-03-11 ENCOUNTER — MYC MEDICAL ADVICE (OUTPATIENT)
Dept: ENDOCRINOLOGY | Facility: CLINIC | Age: 30
End: 2025-03-11
Payer: COMMERCIAL

## 2025-03-11 NOTE — PROGRESS NOTES
Outcome for 25 11:08 AM: Data uploaded on [employer]  Evelin Valdovinos MA  Outcome for 25 11:08 AM: [employer] message sent  Evelin Valdovinos MA  Outcome for 25 2:35 PM: Left Voicemail   Clifton Torrez MA  Outcome for 25 7:50 AM: Data obtained via [employer] website  Clifton Torrez MA        This 29-year-old woman seen for f/u of her type 1 diabetes.  She was diagnosed in . She is now pregnant. She is  and  has an  LIZZ: 2025, by Last Menstrual Period.  She is currently about 26 weeks along.  When she saw the diabetes educator 2 weeks ago, they decided that she needed to bolus earlier before her meals.  She has been trying to do so in the last 2 weeks but still fine she goes higher than target after meals.  Because has been running high, she went up on her Tresiba from 18 units to 19 units.  She increased the dose again to 20 units yesterday because she was concerned about being high.  She is currently bolusing about 1 unit for 5 g at breakfast and 1 unit for 6 g at other meals.  She is not having problems with hypoglycemia.  She has not been limiting the carb content of her foods at mealtime.  She says yesterday she had oatmeal for breakfast.  She will usually have leftovers at lunch.  This could be a bowl of rice with different things in it.  She could also have a peanut butter sandwich or salad.  She is feeling tired but is happy that everything is going well with the baby.    She was able to get the InPen cartridges but was told by her pharmacy that they did not have the pen itself.  She is not sure what she supposed to do.                       90  as of 2025       BP: 126/77 126/77  as of 2025     Resp: -- 15  as of 3/18/2024       On exam she is in no acute distress   Recent Labs   Lab Test 25  1506 24  1036 24  1432 10/11/23  1055 23  1214 23  1123 22  1026 12/10/20  1000 12/10/20  0953   A1C 6.7* 6.9* 7.6* 8.1*  --    < >  --    < > 8.0*   TSH   --  0.83  --  0.78  --   --   --   --   --    LDL  --   --   --   --   --   --   --   --  79   HDL  --   --   --   --   --   --   --   --  58   TRIG  --   --   --   --   --   --   --   --  90   CR  --  0.67 0.83 0.78  --    < >  --    < >  --    MICROL  --   --   --   --  <12.0  --  <5   < >  --     < > = values in this interval not displayed.     assessment and plan:   This 29-year-old woman with pre-existing type 1 diabetes is now about 26 weeks into her first pregnancy.  Her A1c's have come down nicely but she continues to have sugars that are above pregnancy targets after meals.  I told her I confirmed with the educator's recommendations that she should bolus at least 15 minutes before she eats.  I suggested we change her carb ratios at breakfast to 1 unit for 4 g and at all other meals to 1 unit for 6 g.  To avoid hypoglycemia, I will have her reduce her Tresiba back down to 19 units.  We also talked about limiting the carb content in her meals.  She previously was told to think about eating 30 g of carb at breakfast and maybe 45-60 at other meals.  She finds this hard to do but will try her best during the next week.  I also told her that another option would be to eat more low-carb meals more frequently.  This could help avoid some of the postprandial highs.  I will send a prescription for an InPen device to our pharmacy.    I volunteered to review her data next week if she thinks things have not improved.  She is scheduled to see someone in the team every 2 weeks for the rest of her pregnancy.    She is scheduled to see her eye doctor soon.    I spent 22 minutes on the video visit with the patient today (start time 5: 00 and end time 5: 2 2).  On the same day of visit I spent an additional 5 minutes reviewing her chart, doing documentation, and placing orders.  This time was exclusive of the time I spent reviewing her continuous glucose monitoring data.  The visit was done at my office away from  clinic.    Lola Butler MD

## 2025-03-18 ENCOUNTER — VIRTUAL VISIT (OUTPATIENT)
Dept: ENDOCRINOLOGY | Facility: CLINIC | Age: 30
End: 2025-03-18
Payer: COMMERCIAL

## 2025-03-18 DIAGNOSIS — O24.012 PREGNANCY COMPLICATED BY PRE-EXISTING TYPE 1 DIABETES IN SECOND TRIMESTER: Primary | ICD-10-CM

## 2025-03-18 PROCEDURE — 98005 SYNCH AUDIO-VIDEO EST LOW 20: CPT | Mod: 25 | Performed by: INTERNAL MEDICINE

## 2025-03-18 PROCEDURE — 95251 CONT GLUC MNTR ANALYSIS I&R: CPT | Performed by: INTERNAL MEDICINE

## 2025-03-18 RX ORDER — INSULIN PEN,REUSABLE,BT LISPRO
1 INSULIN PEN (EA) SUBCUTANEOUS DAILY
Qty: 1 EACH | Refills: 1 | Status: SHIPPED | OUTPATIENT
Start: 2025-03-18

## 2025-03-18 NOTE — PATIENT INSTRUCTIONS
We changed the carb ratio at breakfast to 1 unit for every 4 g and 4 other meals to 1 unit for every 5 g.    Reduce the Tresiba down to 19 units.

## 2025-03-18 NOTE — LETTER
3/18/2025       RE: Isaiah Jarquin  [address] [facility] [address]     Dear Colleague,    Thank you for referring your patient, Isaiah Jarquin, to the [facility] ENDOCRINOLOGY [facility]INIC [place] at [facility]. Please see a copy of my visit note below.    Outcome for 25 11:08 AM: Data uploaded on [employer] (ML4D)  Evelin Valdovinos MA  Outcome for 25 11:08 AM: [employer] message sent  Evelin Valdovinos MA  Outcome for 25 2:35 PM: Left Voicemail   Clifton Torrez MA  Outcome for 25 7:50 AM: Data obtained via [employer] (ML4D) website  Clifton Torrez MA        This 29-year-old woman seen for f/u of her type 1 diabetes.  She was diagnosed in . She is now pregnant. She is  and  has an  LIZZ: 2025, by Last Menstrual Period.  She is currently about 26 weeks along.  When she saw the diabetes educator 2 weeks ago, they decided that she needed to bolus earlier before her meals.  She has been trying to do so in the last 2 weeks but still fine she goes higher than target after meals.  Because has been running high, she went up on her Tresiba from 18 units to 19 units.  She increased the dose again to 20 units yesterday because she was concerned about being high.  She is currently bolusing about 1 unit for 5 g at breakfast and 1 unit for 6 g at other meals.  She is not having problems with hypoglycemia.  She has not been limiting the carb content of her foods at mealtime.  She says yesterday she had oatmeal for breakfast.  She will usually have leftovers at lunch.  This could be a bowl of rice with different things in it.  She could also have a peanut butter sandwich or salad.  She is feeling tired but is happy that everything is going well with the baby.    She was able to get the InPen cartridges but was told by her pharmacy that they did not have the pen itself.  She is not sure what she supposed to do.                       90  as of 2025        BP: 126/77 126/77  as of 2/25/2025     Resp: -- 15  as of 3/18/2024       On exam she is in no acute distress   Recent Labs   Lab Test 02/25/25  1506 12/11/24  1036 03/18/24  1432 10/11/23  1055 03/21/23  1214 03/21/23  1123 03/22/22  1026 12/10/20  1000 12/10/20  0953   A1C 6.7* 6.9* 7.6* 8.1*  --    < >  --    < > 8.0*   TSH  --  0.83  --  0.78  --   --   --   --   --    LDL  --   --   --   --   --   --   --   --  79   HDL  --   --   --   --   --   --   --   --  58   TRIG  --   --   --   --   --   --   --   --  90   CR  --  0.67 0.83 0.78  --    < >  --    < >  --    MICROL  --   --   --   --  <12.0  --  <5   < >  --     < > = values in this interval not displayed.     assessment and plan:   This 29-year-old woman with pre-existing type 1 diabetes is now about 26 weeks into her first pregnancy.  Her A1c's have come down nicely but she continues to have sugars that are above pregnancy targets after meals.  I told her I confirmed with the educator's recommendations that she should bolus at least 15 minutes before she eats.  I suggested we change her carb ratios at breakfast to 1 unit for 4 g and at all other meals to 1 unit for 6 g.  To avoid hypoglycemia, I will have her reduce her Tresiba back down to 19 units.  We also talked about limiting the carb content in her meals.  She previously was told to think about eating 30 g of carb at breakfast and maybe 45-60 at other meals.  She finds this hard to do but will try her best during the next week.  I also told her that another option would be to eat more low-carb meals more frequently.  This could help avoid some of the postprandial highs.  I will send a prescription for an InPen device to our pharmacy.    I volunteered to review her data next week if she thinks things have not improved.  She is scheduled to see someone in the team every 2 weeks for the rest of her pregnancy.    She is scheduled to see her eye doctor soon.    I spent 22 minutes on the video visit  with the patient today (start time 5: 00 and end time 5: 2 2).  On the same day of visit I spent an additional 5 minutes reviewing her chart, doing documentation, and placing orders.  This time was exclusive of the time I spent reviewing her continuous glucose monitoring data.  The visit was done at my office away from clinic.    Lola Butler MD      Again, thank you for allowing me to participate in the care of your patient.      Sincerely,    Lola Butler MD

## 2025-03-18 NOTE — NURSING NOTE
Current patient location: [address] [facility] [address]    Is the patient currently in the state of [place]? YES    Visit mode: VIDEO    If the visit is dropped, the patient can be reconnected by:VIDEO VISIT: Send to e-mail at: [email or web address]    Will anyone else be joining the visit? NO  (If patient encounters technical issues they should call [phone]    Are changes needed to the allergy or medication list? Pt stated no med changes    Are refills needed on medications prescribed by this physician? Discuss with provider- Injection device for insulin -Inpen humalog Device-  needs to move prescription to another pharmacy-  Pt thinking the [facility] pharmacy- diabetic educator states sometimes [facility] can't get it.     Rooming Documentation:  Not applicable    Reason for visit: RECHECK (Return GDM )    Ruthann Pinon VVF    Pt is experiencing no pain- just general pregnancy discomfort.

## 2025-03-26 ENCOUNTER — MYC REFILL (OUTPATIENT)
Dept: ENDOCRINOLOGY | Facility: CLINIC | Age: 30
End: 2025-03-26
Payer: COMMERCIAL

## 2025-03-26 DIAGNOSIS — O24.012 PREGNANCY COMPLICATED BY PRE-EXISTING TYPE 1 DIABETES IN SECOND TRIMESTER: ICD-10-CM

## 2025-03-26 RX ORDER — INSULIN PEN,REUSABLE,BT LISPRO
1 INSULIN PEN (EA) SUBCUTANEOUS DAILY
Qty: 1 EACH | Refills: 3 | Status: SHIPPED | OUTPATIENT
Start: 2025-03-26

## 2025-04-03 ENCOUNTER — HOSPITAL ENCOUNTER (OUTPATIENT)
Dept: ULTRASOUND IMAGING | Facility: CLINIC | Age: 30
Discharge: HOME | End: 2025-04-03
Attending: STUDENT IN AN ORGANIZED HEALTH CARE EDUCATION/TRAINING PROGRAM
Payer: COMMERCIAL

## 2025-04-03 ENCOUNTER — PRENATAL OFFICE VISIT (OUTPATIENT)
Dept: OBGYN | Facility: CLINIC | Age: 30
End: 2025-04-03
Payer: COMMERCIAL

## 2025-04-03 ENCOUNTER — OFFICE VISIT (OUTPATIENT)
Dept: MATERNAL FETAL MEDICINE | Facility: CLINIC | Age: 30
End: 2025-04-03
Attending: STUDENT IN AN ORGANIZED HEALTH CARE EDUCATION/TRAINING PROGRAM
Payer: COMMERCIAL

## 2025-04-03 VITALS
DIASTOLIC BLOOD PRESSURE: 74 MMHG | OXYGEN SATURATION: 98 % | SYSTOLIC BLOOD PRESSURE: 126 MMHG | BODY MASS INDEX: 32.44 KG/M2 | WEIGHT: 189 LBS | HEART RATE: 98 BPM

## 2025-04-03 DIAGNOSIS — O24.013 TYPE 1 DIABETES MELLITUS DURING PREGNANCY IN THIRD TRIMESTER: ICD-10-CM

## 2025-04-03 DIAGNOSIS — O44.40 ENCOUNTER FOR REPEAT ULTRASOUND FOR LOW LYING PLACENTA, ANTEPARTUM: ICD-10-CM

## 2025-04-03 DIAGNOSIS — O24.012 TYPE 1 DIABETES MELLITUS DURING PREGNANCY IN SECOND TRIMESTER: Primary | ICD-10-CM

## 2025-04-03 DIAGNOSIS — O09.92 HIGH-RISK PREGNANCY, SECOND TRIMESTER: Primary | ICD-10-CM

## 2025-04-03 PROCEDURE — 3074F SYST BP LT 130 MM HG: CPT | Performed by: OBSTETRICS & GYNECOLOGY

## 2025-04-03 PROCEDURE — 0502F SUBSEQUENT PRENATAL CARE: CPT | Performed by: OBSTETRICS & GYNECOLOGY

## 2025-04-03 PROCEDURE — 76816 OB US FOLLOW-UP PER FETUS: CPT

## 2025-04-03 PROCEDURE — 99207 PR PRENATAL VISIT: CPT | Performed by: OBSTETRICS & GYNECOLOGY

## 2025-04-03 PROCEDURE — 3078F DIAST BP <80 MM HG: CPT | Performed by: OBSTETRICS & GYNECOLOGY

## 2025-04-03 NOTE — NURSING NOTE
sIaiah Jarquin is here for ultrasound today. Denies questions or concerns today. Patient reports positive fetal movement, denies contractions, leaking of fluid, or bleeding.  Reports blood sugar values fasting and hr post prandial improving with increased insulin dosing for long acting and meal coverage. Education provided to patient on today's ultrasound.  SBAR given to COCO HANNAH, see their note in Epic.

## 2025-04-03 NOTE — PROGRESS NOTES
The patient was seen for an ultrasound in the [facility] today.  For a detailed report of the ultrasound examination, please see the ultrasound report which can be found under the imaging tab.    If you have questions regarding today's evaluation or if we can be of further service, please contact the [facility].    Lola Gay MD  , OB/GYN  Maternal-Fetal Medicine

## 2025-04-16 ENCOUNTER — PRENATAL OFFICE VISIT (OUTPATIENT)
Dept: OBGYN | Facility: CLINIC | Age: 30
End: 2025-04-16
Payer: COMMERCIAL

## 2025-04-16 VITALS
TEMPERATURE: 97.8 F | HEART RATE: 98 BPM | DIASTOLIC BLOOD PRESSURE: 73 MMHG | WEIGHT: 190.7 LBS | BODY MASS INDEX: 32.73 KG/M2 | OXYGEN SATURATION: 98 % | SYSTOLIC BLOOD PRESSURE: 108 MMHG

## 2025-04-16 DIAGNOSIS — O09.93 HIGH-RISK PREGNANCY, THIRD TRIMESTER: Primary | ICD-10-CM

## 2025-04-16 DIAGNOSIS — Z23 NEED FOR DIPHTHERIA-TETANUS-PERTUSSIS (TDAP) VACCINE: ICD-10-CM

## 2025-04-16 PROCEDURE — 3074F SYST BP LT 130 MM HG: CPT

## 2025-04-16 PROCEDURE — 90715 TDAP VACCINE 7 YRS/> IM: CPT

## 2025-04-16 PROCEDURE — 99207 PR PRENATAL VISIT: CPT | Mod: 25

## 2025-04-16 PROCEDURE — 90471 IMMUNIZATION ADMIN: CPT

## 2025-04-16 PROCEDURE — 3078F DIAST BP <80 MM HG: CPT

## 2025-04-16 PROCEDURE — 0502F SUBSEQUENT PRENATAL CARE: CPT

## 2025-04-16 NOTE — PROGRESS NOTES
30.2 wks here for PAULA  +FM  Denies cramping, ctx, bleeding or LOF  Pump script given and instructed how to fill  -T1 DM bg have been more challenging this week, has endo apt coming up- discussed insulin resistance in pregnancy, typically increased insulin needs third trimester, gtt drip in hospital, importance of tight glycemic control prior to delivery for baby implications, importance of early skin to skin and initiation of bf- consideration of hand expression at term, also hdm avail in hospital   -discussed very typically insulin needs plummit after delivery and when placenta delivers  -discussed typically more risk of hypoglycemia PP due to increase in caloric needs with BF  -this pattern can be understandably destabilizing for someone who has lived with T1DM and normally has tight glycemic control outside of pregnancy, discussed frequent bg mom/baby checks in hospital, may bring own lancet, but hospital policy is to use glucometer not CGM  -interviewing a pediatrician this week  -would like a tour- discussed we need clarification of tour times from hospital  Next US 4/28  Tdap given  Rtc 2 wks  RN triage for fkc, srom, ptl, vag bleeding or pre-e   VELASQUEZ Solano CNP

## 2025-04-20 ENCOUNTER — HEALTH MAINTENANCE LETTER (OUTPATIENT)
Age: 30
End: 2025-04-20

## 2025-04-25 NOTE — PROGRESS NOTES
Outcome for 25 2:52 PM: Data uploaded on [email or web address]  Clifton Torrez MA  Outcome for 25 2:52 PM: [employer] message sent requesting inpen report.   Clifton Torrez MA  Outcome for 25 8:40 AM:  Per patient, will reply to [employer] message with inpen report.  Clifton Torrez MA  Outcome for 25 9:44 AM:  Received inpen report via [employer].   Evelin Valdovinos MA  Outcome for 25 10:53 AM: Data obtained via [email or web address] website  Clifton Torrez MA      This 29-year-old woman seen for f/u of her type 1 diabetes.  She was diagnosed in . She is now 33 wks pregnant. She is  and  has an  LIZZ: 2025, by Last Menstrual Period.  Charles is using a perla sensor and an InPen device.  She uses the InPen device to record insulin but does not use it to calculate her doses.  She is taking 30 units of Tresiba each day.  She increased that the Tresiba to that dose a couple of days ago.  She was unable to refill her Tresiba so she has been using Semglee at the same dose for the last couple of days.  At mealtime she is taking 1 unit for 3 g of carb at breakfast and 1 unit for 4 g of carb the rest of the day.  She does go for walks.  She has not had any hypoglycemia.  She feels confident with carb counting.  She has not seen her eye doctor yet.  She saw them last May and thought she would wait till after pregnancy to see them in case something changed because of pregnancy.  She denies chest pain or shortness of breath.  She has no edema.  Energy level is okay.                                Current Outpatient Medications   Medication Sig Dispense Refill    aspirin 81 MG EC tablet Take 81 mg by mouth daily.      blood glucose (NO BRAND SPECIFIED) lancets standard Use to test blood sugar 4 times daily or as directed. 100 each 4    blood glucose (NO BRAND SPECIFIED) test strip Use to test blood sugar 3 times daily or as directed. 50 strip 11    Continuous Glucose Sensor (FREESTYLE PERLA 3 PLUS SENSOR) MISC Use 1 sensor every 15 days. Use  to read blood sugars per 's instructions. 6 each 3    Injection Device for insulin (INPEN 100-GREY-YESICA-HUMALOG) RAJIV 1 each daily. 1 each 3    Injection Device for insulin (INPEN 100-GREY-YESICA-HUMALOG) RAJIV 1 each daily. 1 each 1    Injection Device for insulin (INPEN 100-GREY-NOVOLOG-FIASP) RAJIV 1 each daily. 1 each 1    insulin aspart (NOVOPEN ECHO) 100 UNIT/ML cartridge Use 1 unit for 6 grams of carb and 1 unit to drop 40 over 150 up to 50 units a day 12 mL 3    insulin degludec (TRESIBA FLEXTOUCH) 100 UNIT/ML pen Inject 19 Units subcutaneously daily. 15 mL 3    INSULIN GLARGINE 100 UNIT/ML pen INJECT 23 UNITS SUBCUTANEOUS AT BEDTIME      insulin lispro (HUMALOG KWIKPEN) 100 UNIT/ML (1 unit dial) KWIKPEN Use  1 unit for 6 g CHO with meals  up to 30 units a day 15 mL 2    insulin lispro (HUMALOG PENFILL) 100 UNIT/ML Cartridge Directed up to 40 units a day. 24 mL 5    insulin pen needle (32G X 4 MM) 32G X 4 MM miscellaneous Use 5 pen needles daily or as directed. 400 each 3    NOVOLOG FLEXPEN 100 UNIT/ML soln Use 1 unit for 7 grams up to total of 30 units per day 30 mL 3    SEMGLEE, YFGN, 100 UNIT/ML SOPN Inject 23 Units Subcutaneous daily 15 mL 3     No current facility-administered medications for this visit.                97.8  F (36.6  C)  as of 4/16/2025       Pulse: 101 101  as of 5/2/2025     BP: 127/79 127/79  as of 5/2/2025         Is well on exam.  Recent Labs   Lab Test 02/25/25  1506 12/11/24  1036 03/18/24  1432 10/11/23  1055 03/21/23  1214 03/21/23  1123 03/22/22  1026 12/10/20  1000 12/10/20  0953   A1C 6.7* 6.9* 7.6* 8.1*  --    < >  --    < > 8.0*   TSH  --  0.83  --  0.78  --   --   --   --   --    LDL  --   --   --   --   --   --   --   --  79   HDL  --   --   --   --   --   --   --   --  58   TRIG  --   --   --   --   --   --   --   --  90   CR  --  0.67 0.83 0.78  --    < >  --    < >  --    MICROL  --   --   --   --  <12.0  --  <5   < >  --     < > = values in this interval  not displayed.     Assessment and plan:    1.  Diabetes control.  Her fasting glucoses appear to be generally in control but she continues to have postprandial sugars that are above target.  I asked her to increase her carb ratio to 1 unit for 2 g of carb at breakfast and 1 unit for 3 g of carb at other times of the day.  She should keep with the same correction scale.  I asked her to switch back to the Tresiba today since she picked it up rather than use up the pen of Semglee.  I am concerned that the Semglee will not provide good coverage in the 4 hours before its dosed.    2.  Diabetes complications.  She does not have any but I urged her to see her eye doctor now rather than waiting till after she is done with her pregnancy.    Continue to follow-up with one of the team members every couple of weeks.    I spent 25 minutes with the patient on the video visit (start time 2: 3 0 and end time 2: 5 5).  On the same day of visit I spent an additional 5 minutes reviewing her chart and doing documentation.  This time was exclusive of the time I spent reviewing and interpreting her CGM data.  The visit was done at my office away from clinic.

## 2025-04-29 ENCOUNTER — HOSPITAL ENCOUNTER (OUTPATIENT)
Dept: ULTRASOUND IMAGING | Facility: CLINIC | Age: 30
Discharge: HOME | End: 2025-04-29
Attending: OBSTETRICS & GYNECOLOGY
Payer: COMMERCIAL

## 2025-04-29 ENCOUNTER — OFFICE VISIT (OUTPATIENT)
Dept: MATERNAL FETAL MEDICINE | Facility: CLINIC | Age: 30
End: 2025-04-29
Attending: OBSTETRICS & GYNECOLOGY
Payer: COMMERCIAL

## 2025-04-29 DIAGNOSIS — O24.012 TYPE 1 DIABETES MELLITUS DURING PREGNANCY IN SECOND TRIMESTER: ICD-10-CM

## 2025-04-29 DIAGNOSIS — O24.013 TYPE 1 DIABETES MELLITUS AFFECTING PREGNANCY IN THIRD TRIMESTER, ANTEPARTUM: Primary | ICD-10-CM

## 2025-04-29 PROCEDURE — 76819 FETAL BIOPHYS PROFIL W/O NST: CPT

## 2025-04-29 NOTE — NURSING NOTE
Patient reports positive fetal movement, denies pain, leaking of fluid, or bleeding.  Education provided to patient on today's ultrasound.  SBAR given to COCO HANNAH, see their note in Epic.

## 2025-04-30 NOTE — PROGRESS NOTES
Please see the imaging tab for details of the ultrasound performed today.    Noemy Larios MD  Specialist in Maternal-Fetal Medicine

## 2025-05-02 ENCOUNTER — HOSPITAL ENCOUNTER (OUTPATIENT)
Dept: ULTRASOUND IMAGING | Facility: CLINIC | Age: 30
Discharge: HOME | End: 2025-05-02
Attending: OBSTETRICS & GYNECOLOGY
Payer: COMMERCIAL

## 2025-05-02 DIAGNOSIS — O24.012 TYPE 1 DIABETES MELLITUS DURING PREGNANCY IN SECOND TRIMESTER: ICD-10-CM

## 2025-05-02 PROCEDURE — 76819 FETAL BIOPHYS PROFIL W/O NST: CPT | Mod: 26 | Performed by: OBSTETRICS & GYNECOLOGY

## 2025-05-02 PROCEDURE — 76819 FETAL BIOPHYS PROFIL W/O NST: CPT

## 2025-05-06 ENCOUNTER — OFFICE VISIT (OUTPATIENT)
Dept: MATERNAL FETAL MEDICINE | Facility: CLINIC | Age: 30
End: 2025-05-06
Attending: OBSTETRICS & GYNECOLOGY
Payer: COMMERCIAL

## 2025-05-06 ENCOUNTER — VIRTUAL VISIT (OUTPATIENT)
Dept: ENDOCRINOLOGY | Facility: CLINIC | Age: 30
End: 2025-05-06
Payer: COMMERCIAL

## 2025-05-06 ENCOUNTER — HOSPITAL ENCOUNTER (OUTPATIENT)
Dept: ULTRASOUND IMAGING | Facility: CLINIC | Age: 30
Discharge: HOME | End: 2025-05-06
Attending: OBSTETRICS & GYNECOLOGY | Admitting: OBSTETRICS & GYNECOLOGY
Payer: COMMERCIAL

## 2025-05-06 DIAGNOSIS — O24.012 PREGNANCY COMPLICATED BY PRE-EXISTING TYPE 1 DIABETES IN SECOND TRIMESTER: ICD-10-CM

## 2025-05-06 DIAGNOSIS — O24.013 TYPE 1 DIABETES MELLITUS AFFECTING PREGNANCY IN THIRD TRIMESTER, ANTEPARTUM: Primary | ICD-10-CM

## 2025-05-06 DIAGNOSIS — O24.012 TYPE 1 DIABETES MELLITUS DURING PREGNANCY IN SECOND TRIMESTER: ICD-10-CM

## 2025-05-06 DIAGNOSIS — E10.9 TYPE 1 DIABETES MELLITUS WITHOUT COMPLICATION (H): ICD-10-CM

## 2025-05-06 PROCEDURE — 1126F AMNT PAIN NOTED NONE PRSNT: CPT | Mod: 95 | Performed by: INTERNAL MEDICINE

## 2025-05-06 PROCEDURE — 98005 SYNCH AUDIO-VIDEO EST LOW 20: CPT | Mod: 25 | Performed by: INTERNAL MEDICINE

## 2025-05-06 PROCEDURE — 95251 CONT GLUC MNTR ANALYSIS I&R: CPT | Performed by: INTERNAL MEDICINE

## 2025-05-06 PROCEDURE — 76819 FETAL BIOPHYS PROFIL W/O NST: CPT

## 2025-05-06 RX ORDER — INSULIN DEGLUDEC 100 U/ML
30 INJECTION, SOLUTION SUBCUTANEOUS DAILY
Qty: 15 ML | Refills: 3 | Status: SHIPPED | OUTPATIENT
Start: 2025-05-06

## 2025-05-06 ASSESSMENT — PAIN SCALES - GENERAL: PAINLEVEL_OUTOF10: NO PAIN (0)

## 2025-05-06 NOTE — LETTER
2025       RE: Isaiah Jarquin  [address] [facility] [address]     Dear Colleague,    Thank you for referring your patient, Isaiah Jarquin, to the [facility] ENDOCRINOLOGY CLINIC [place] at [facility]. Please see a copy of my visit note below.    Outcome for 25 2:52 PM: Data uploaded on [employer]  Clifton Torrez MA  Outcome for 25 2:52 PM: [employer] message sent requesting inpen report.   Clifton Torrez MA  Outcome for 25 8:40 AM:  Per patient, will reply to [employer] message with inpen report.  Clifton Torrez MA  Outcome for 25 9:44 AM:  Received inpen report via [employer].   Evelin Valdovinos MA  Outcome for 25 10:53 AM: Data obtained via [employer] website  Clifton Torrez MA      This 29-year-old woman seen for f/u of her type 1 diabetes.  She was diagnosed in . She is now 33 wks pregnant. She is  and  has an  LIZZ: 2025, by Last Menstrual Period.  Charles is using a perla sensor and an InPen device.  She uses the InPen device to record insulin but does not use it to calculate her doses.  She is taking 30 units of Tresiba each day.  She increased that the Tresiba to that dose a couple of days ago.  She was unable to refill her Tresiba so she has been using Semglee at the same dose for the last couple of days.  At mealtime she is taking 1 unit for 3 g of carb at breakfast and 1 unit for 4 g of carb the rest of the day.  She does go for walks.  She has not had any hypoglycemia.  She feels confident with carb counting.  She has not seen her eye doctor yet.  She saw them last May and thought she would wait till after pregnancy to see them in case something changed because of pregnancy.  She denies chest pain or shortness of breath.  She has no edema.  Energy level is okay.                                Current Outpatient Medications   Medication Sig Dispense Refill     aspirin 81 MG EC tablet Take 81 mg by mouth daily.        blood glucose (NO BRAND SPECIFIED) lancets standard Use to test blood sugar 4 times daily or as directed. 100 each 4     blood glucose (NO BRAND SPECIFIED) test strip Use to test blood sugar 3 times daily or as directed. 50 strip 11     Continuous Glucose Sensor (FREESTYLE BIGG 3 PLUS SENSOR) MISC Use 1 sensor every 15 days. Use to read blood sugars per 's instructions. 6 each 3     Injection Device for insulin (INPEN 100-GREY-YESICA-HUMALOG) RAJIV 1 each daily. 1 each 3     Injection Device for insulin (INPEN 100-GREY-YESICA-HUMALOG) RAJIV 1 each daily. 1 each 1     Injection Device for insulin (INPEN 100-GREY-NOVOLOG-FIASP) RAJIV 1 each daily. 1 each 1     insulin aspart (NOVOPEN ECHO) 100 UNIT/ML cartridge Use 1 unit for 6 grams of carb and 1 unit to drop 40 over 150 up to 50 units a day 12 mL 3     insulin degludec (TRESIBA FLEXTOUCH) 100 UNIT/ML pen Inject 19 Units subcutaneously daily. 15 mL 3     INSULIN GLARGINE 100 UNIT/ML pen INJECT 23 UNITS SUBCUTANEOUS AT BEDTIME       insulin lispro (HUMALOG KWIKPEN) 100 UNIT/ML (1 unit dial) KWIKPEN Use  1 unit for 6 g CHO with meals  up to 30 units a day 15 mL 2     insulin lispro (HUMALOG PENFILL) 100 UNIT/ML Cartridge Directed up to 40 units a day. 24 mL 5     insulin pen needle (32G X 4 MM) 32G X 4 MM miscellaneous Use 5 pen needles daily or as directed. 400 each 3     NOVOLOG FLEXPEN 100 UNIT/ML soln Use 1 unit for 7 grams up to total of 30 units per day 30 mL 3     SEMGLEE, YFGN, 100 UNIT/ML SOPN Inject 23 Units Subcutaneous daily 15 mL 3     No current facility-administered medications for this visit.                97.8  F (36.6  C)  as of 4/16/2025       Pulse: 101 101  as of 5/2/2025     BP: 127/79 127/79  as of 5/2/2025         Is well on exam.  Recent Labs   Lab Test 02/25/25  1506 12/11/24  1036 03/18/24  1432 10/11/23  1055 03/21/23  1214 03/21/23  1123 03/22/22  1026 12/10/20  1000 12/10/20  0953   A1C 6.7* 6.9* 7.6* 8.1*  --    < >  --    < >  8.0*   TSH  --  0.83  --  0.78  --   --   --   --   --    LDL  --   --   --   --   --   --   --   --  79   HDL  --   --   --   --   --   --   --   --  58   TRIG  --   --   --   --   --   --   --   --  90   CR  --  0.67 0.83 0.78  --    < >  --    < >  --    MICROL  --   --   --   --  <12.0  --  <5   < >  --     < > = values in this interval not displayed.     Assessment and plan:    1.  Diabetes control.  Her fasting glucoses appear to be generally in control but she continues to have postprandial sugars that are above target.  I asked her to increase her carb ratio to 1 unit for 2 g of carb at breakfast and 1 unit for 3 g of carb at other times of the day.  She should keep with the same correction scale.  I asked her to switch back to the Tresiba today since she picked it up rather than use up the pen of Semglee.  I am concerned that the Semglee will not provide good coverage in the 4 hours before its dosed.    2.  Diabetes complications.  She does not have any but I urged her to see her eye doctor now rather than waiting till after she is done with her pregnancy.    Continue to follow-up with one of the team members every couple of weeks.    I spent 25 minutes with the patient on the video visit (start time 2: 3 0 and end time 2: 5 5).  On the same day of visit I spent an additional 5 minutes reviewing her chart and doing documentation.  This time was exclusive of the time I spent reviewing and interpreting her CGM data.  The visit was done at my office away from clinic.      Again, thank you for allowing me to participate in the care of your patient.      Sincerely,    Lola Butler MD

## 2025-05-06 NOTE — PATIENT INSTRUCTIONS
Switch back to Tresiba    Change her carb ratio to 1 unit for every 2 g of carbohydrate at breakfast and 1 unit for every 3 g of carbohydrate at other times of day.  Continue with your same correction scale of 1 unit to drop you 25 points with a target of 120    Please go get your A1c measured. Please contact us to schedule at any of our [facility] lab locations  Call [phone] ([phone]), select option 1     Make an appointment with your eye doctor

## 2025-05-06 NOTE — PROGRESS NOTES
"Please see \"Imaging\" tab under \"Chart Review\" for details of today's visit.    Chikis Paredes MD    "

## 2025-05-06 NOTE — PROGRESS NOTES
"Virtual Visit Details    Type of service:  Video Visit     Originating Location (pt. Location): {video visit patient location:937897::\"Home\"}  {PROVIDER LOCATION On-site should be selected for visits conducted from your clinic location or adjoining [facility] hospital, academic office, or other nearby [facility] building. Off-site should be selected for all other provider locations, including home:[id number]}  Distant Location (provider location):  {virtual location provider:311794}  Platform used for Video Visit: {Virtual Visit Platforms:191771::\"Richard Pauer - 3P\"}    "

## 2025-05-06 NOTE — NURSING NOTE
Current patient location: Patient declined to provide     Is the patient currently in the state of [place]? YES    Visit mode: VIDEO    If the visit is dropped, the patient can be reconnected by:VIDEO VISIT: Text to cell phone:   Telephone Information:   Mobile [phone]       Will anyone else be joining the visit? NO  (If patient encounters technical issues they should call [phone]    Are changes needed to the allergy or medication list? No    Are refills needed on medications prescribed by this physician? NO    Rooming Documentation:  Questionnaire(s) not done per department protocol    Reason for visit: RECHECK Shelby Kocher VVF

## 2025-05-06 NOTE — NURSING NOTE
Patient reports positive fetal movement, no pain, no contractions, leaking of fluid, or bleeding.  Reports blood sugar have been a lot better this week.  Education provided to patient on today's ultrasound.  SBAR given to COCO HANNAH, see their note in Epic.

## 2025-05-07 ENCOUNTER — RESULTS FOLLOW-UP (OUTPATIENT)
Dept: OBGYN | Facility: CLINIC | Age: 30
End: 2025-05-07

## 2025-05-09 ENCOUNTER — HOSPITAL ENCOUNTER (OUTPATIENT)
Dept: ULTRASOUND IMAGING | Facility: CLINIC | Age: 30
Discharge: HOME | End: 2025-05-09
Attending: OBSTETRICS & GYNECOLOGY | Admitting: OBSTETRICS & GYNECOLOGY
Payer: COMMERCIAL

## 2025-05-09 DIAGNOSIS — O24.012 TYPE 1 DIABETES MELLITUS DURING PREGNANCY IN SECOND TRIMESTER: ICD-10-CM

## 2025-05-09 PROCEDURE — 76819 FETAL BIOPHYS PROFIL W/O NST: CPT

## 2025-05-12 ENCOUNTER — VIRTUAL VISIT (OUTPATIENT)
Dept: EDUCATION SERVICES | Facility: CLINIC | Age: 30
End: 2025-05-12
Payer: COMMERCIAL

## 2025-05-12 DIAGNOSIS — E10.9 TYPE 1 DIABETES MELLITUS WITHOUT COMPLICATION (H): ICD-10-CM

## 2025-05-12 DIAGNOSIS — O24.012 PRE-EXISTING TYPE 1 DIABETES MELLITUS DURING PREGNANCY IN SECOND TRIMESTER: Primary | ICD-10-CM

## 2025-05-12 PROCEDURE — G0108 DIAB MANAGE TRN  PER INDIV: HCPCS | Mod: 95 | Performed by: DIETITIAN, REGISTERED

## 2025-05-12 RX ORDER — INSULIN DEGLUDEC 100 U/ML
33 INJECTION, SOLUTION SUBCUTANEOUS DAILY
Qty: 15 ML | Refills: 3 | Status: SHIPPED | OUTPATIENT
Start: 2025-05-12 | End: 2025-05-15

## 2025-05-12 NOTE — LETTER
5/12/2025         RE: Isaiah Jarquin  [address] [address]        Dear Colleague,    Thank you for referring your patient, Isaiah Jarquin, to the [facility]. Please see a copy of my visit note below.    Diabetes Self-Management Education & Support    Presents for: Pre-Existing Diabetes and Pregnancy (type 1)    Type of service:  Video Visit    If the video visit is dropped, the video visit invitation should be resent by: Text to cell phone: [phone]    Originating Location (pt. Location): Home  Distant Location (provider location): [facility]  Mode of Communication:  Video Conference via [employer]    Video Start Time: 2:10 pm  Video End Time (time video stopped): 2:50 pm    How would patient like to obtain AVS? MyChart      Reports/BG Log                      Assessment  Currently taking:  Tresiba 32 unit(s) (has taken for the past 3 days)  Humalog penfill for inpen but but no data saved in her inpen report.  Carb ratio of 1:2 breakfast and 1:3 rest of the day (endocrinology changed these last week)  Correction of 1:25 >120 mg/dL    Next US at 36 weeks. LGA 32 weeks 98%.     Her time in range has improved since endocrinology visit last week but remains below target for pregnancy.  Currently she takes mealtime doses 15-20 min before meals.  Could benefit from taking injection earlier prior to eating. Nocturnal glucose remains above target but she is a bit nervous to raise the Tresiba much.     Glucose Patterns & Trends:  Time in range of  mg/dL, 34% of the time. Patient not meeting ADA Time in Range Targets.    Changes made to sensor settings:   No changes to sensor settings recommended at this time.    Opportunities for ongoing education and support in diabetes-self management were discussed.    PLAN:  Medication Adjustments:  -- Start or change to carbohydrate ratio at meals: change carb ratio at breakfast  from 2 to 1.5.  Give mealtime dose closer to 30 min before eating.   -- Increase Tresiba 32 to 33 unit(s) per day (she feels most comfortable raising it  a little at a time).  Follow Up:  -- Follow-up in 3-4 days.     Topics to cover at upcoming visits: review glucose and insulin adjustments    See Care Plan for co-developed, patient-stated behavior change goals.    Education Materials Provided:  No new materials provided today    Subjective/Objective  Presents for: Follow-up  Accompanied by: Self  Diabetes education in the past 24mo: (Patient-Rptd) Yes  Diabetes type: (Patient-Rptd) Type 1  Disease course: (Patient-Rptd) Other (see Comments)  Please elaborate:: (Patient-Rptd) Im pregnant andbit changes constantly  How confident are you filling out medical forms by yourself:: (Patient-Rptd) Extremely  Diabetes management related comments/concerns: (Patient-Rptd) No  Cultural Influences/Ethnic Background:  Choose not to answer      Diabetes Symptoms & Complications  Diabetes Related Symptoms: (Patient-Rptd) None  Weight trend: (Patient-Rptd) Increasing  Symptom course: (Patient-Rptd) Stable  Disease course: (Patient-Rptd) Other (see Comments)  Please elaborate:: (Patient-Rptd) Im pregnant andbit changes constantly     Patient Problem List and Family Medical History reviewed for relevant medical history, current medical status, and diabetes risk factors.    Vitals:  LMP 09/16/2024     Wt Readings from Last 8 Encounters:   05/02/25 87.6 kg (193 lb 1.6 oz)   04/16/25 86.5 kg (190 lb 11.2 oz)   04/03/25 85.7 kg (189 lb)   02/25/25 83.9 kg (185 lb)   01/28/25 82.6 kg (182 lb)   12/11/24 80.8 kg (178 lb 3.2 oz)   11/19/24 77.1 kg (170 lb)   11/13/24 78.2 kg (172 lb 6.4 oz)       Estimated Date of Delivery: Jun 23, 2025    Labs:  Lab Results   Component Value Date    A1C 6.7 02/25/2025    A1C 8.0 12/10/2020     Lab Results   Component Value Date     12/11/2024    GLC 82 03/22/2022     12/10/2020     Lab  Results   Component Value Date    LDL 79 12/10/2020     HDL Cholesterol   Date Value Ref Range Status   12/10/2020 58 >49 mg/dL Final     GFR Estimate   Date Value Ref Range Status   12/11/2024 >90 >60 mL/min/1.73m2 Final     Comment:     eGFR calculated using 2021 CKD-EPI equation.   12/10/2020 >90 >60 mL/min/[1.73_m2] Final     Comment:     Non  GFR Calc  Starting 12/18/2018, serum creatinine based estimated GFR (eGFR) will be   calculated using the Chronic Kidney Disease Epidemiology Collaboration   (CKD-EPI) equation.       GFR Estimate If Black   Date Value Ref Range Status   12/10/2020 >90 >60 mL/min/[1.73_m2] Final     Comment:      GFR Calc  Starting 12/18/2018, serum creatinine based estimated GFR (eGFR) will be   calculated using the Chronic Kidney Disease Epidemiology Collaboration   (CKD-EPI) equation.       Lab Results   Component Value Date    CR 0.67 12/11/2024    CR 0.78 12/10/2020     Lab Results   Component Value Date    MICROL <12.0 03/21/2023    UMALCR  03/21/2023      Comment:      Unable to calculate, urine albumin and/or urine creatinine is outside detectable limits.  Microalbuminuria is defined as an albumin:creatinine ratio of 17 to 299 for males and 25 to 299 for females. A ratio of albumin:creatinine of 300 or higher is indicative of overt proteinuria.  Due to biologic variability, positive results should be confirmed by a second, first-morning random or 24-hour timed urine specimen. If there is discrepancy, a third specimen is recommended. When 2 out of 3 results are in the microalbuminuria range, this is evidence for incipient nephropathy and warrants increased efforts at glucose control, blood pressure control, and institution of therapy with an angiotensin-converting-enzyme (ACE) inhibitor (if the patient can tolerate it).      UCRR 20.8 12/11/2024       Last 3 BP:   BP Readings from Last 3 Encounters:   05/02/25 127/79   04/16/25 108/73   04/03/25  126/74       History   Smoking Status     Never   Smokeless Tobacco     Never         5/12/2025   Healthy Eating   Healthy Eating Assessed Today Yes   Cultural/Moravian diet restrictions? No   Meal planning/habits Carb counting   How many times a week on average do you eat food made away from home (restaurant/take-out)? 1   Meals include Breakfast;Lunch;Dinner   Breakfast lower carb protein shake with banana in it and milk in it   Lunch aims for 45-60 grams of carb   Dinner fish tacos or pasta or caesar with bread   Beverages Water;Coffee     45-60 grams of carb but sometimes higher.        5/12/2025   Being Active   Being Active Assessed Today Yes   Exercise: Yes   Days per week of moderate to strenuous exercise (like a brisk walk) 4   On average, minutes per day of exercise at this level 20   How intense was your typical exercise?  Light (like stretching or slow walking)   Exercise Minutes per Week 80   Barrier to exercise Other         5/12/2025   Monitoring   Monitoring Assessed Today Yes   Blood Glucose Meter CGM   Times checking blood sugar at home (number) 5+   Times checking blood sugar at home (per) Day     Diabetes Medication(s)       Insulin       insulin aspart (NOVOPEN ECHO) 100 UNIT/ML cartridge Use 1 unit for 6 grams of carb and 1 unit to drop 40 over 150 up to 50 units a day     insulin degludec (TRESIBA FLEXTOUCH) 100 UNIT/ML pen Inject 30 Units subcutaneously daily.     INSULIN GLARGINE 100 UNIT/ML pen INJECT 23 UNITS SUBCUTANEOUS AT BEDTIME     insulin lispro (HUMALOG KWIKPEN) 100 UNIT/ML (1 unit dial) KWIKPEN Use  1 unit for 6 g CHO with meals  up to 30 units a day     insulin lispro (HUMALOG PENFILL) 100 UNIT/ML Cartridge Directed up to 80 units a day.     NOVOLOG FLEXPEN 100 UNIT/ML soln Use 1 unit for 7 grams up to total of 30 units per day     SEMGLEE, YFGN, 100 UNIT/ML SOPN Inject 23 Units Subcutaneous daily              5/12/2025   Taking Medications   Taking Medication Assessed Today Yes    Current Treatments Insulin Injections         5/12/2025   Problem Solving   Problem Solving Assessed Today Yes   Is the patient at risk for hypoglycemia? Yes   Hypoglycemia Frequency Weekly           5/12/2025   Reducing Risks   Reducing Risks Assessed Today No   Has dilated eye exam at least once a year? No   Sees dentist every 6 months? No   Feet checked by healthcare provider in the last year? Yes         5/12/2025   Healthy Coping: Diabetes Distress Assessment   Healthy Coping Assessed Today Yes   I feel burned out by all of the attention and effort that diabetes demands of me. 2 - A Little Problem   It bothers me that diabetes seems to control my life. 2 - A Little Problem   I am frustrated that even when I do what I am supposed to for my diabetes, it doesn't seem to make a difference. 2 - A Little Problem   No matter how hard I try with my diabetes, it feels like it will never be good enough. 1 - Not a Problem   I am so tired of having to worry about diabetes all the time. 2 - A Little Problem   When it comes to my diabetes, I often feel like a failure. 2 - A Little Problem   It depresses me when I realize that my diabetes will likely never go away. 1 - Not a Problem   Living with diabetes is overwhelming for me. 1 - Not a Problem   T2 DDAS Total Score (0 - 1.9 Little or no DD, 2.0 - 2.9 Moderate DD,  3.0+ High DD) 1.6    Informal Support system: Family;Friends;Spouse       Patient-reported         Care Plan and Education Provided:  Taking Medication: Giving mealtime insulin sooner before eating.   Importance of regular follow up and weekly touchpoints.   Reviewed insulin resistance in pregnancy.     Azra Bustamante, RUPERTO, REJI, [facility]  Time Spent: 40 minutes  Encounter Type: Individual    Diabetes medication dose changes were made via the [facility] under the patient's referring provider.

## 2025-05-12 NOTE — PROGRESS NOTES
Diabetes Self-Management Education & Support    Presents for: Pre-Existing Diabetes and Pregnancy (type 1)    Type of service:  Video Visit    If the video visit is dropped, the video visit invitation should be resent by: Text to cell phone: [phone]    Originating Location (pt. Location): Home  Distant Location (provider location): [facility]  Mode of Communication:  Video Conference via [employer]    Video Start Time: 2:10 pm  Video End Time (time video stopped): 2:50 pm    How would patient like to obtain AVS? MyChart      Reports/BG Log                      Assessment  Currently taking:  Tresiba 32 unit(s) (has taken for the past 3 days)  Humalog penfill for inpen but but no data saved in her inpen report.  Carb ratio of 1:2 breakfast and 1:3 rest of the day (endocrinology changed these last week)  Correction of 1:25 >120 mg/dL    Next US at 36 weeks. LGA 32 weeks 98%.     Her time in range has improved since endocrinology visit last week but remains below target for pregnancy.  Currently she takes mealtime doses 15-20 min before meals.  Could benefit from taking injection earlier prior to eating. Nocturnal glucose remains above target but she is a bit nervous to raise the Tresiba much.     Glucose Patterns & Trends:  Time in range of  mg/dL, 34% of the time. Patient not meeting ADA Time in Range Targets.    Changes made to sensor settings:   No changes to sensor settings recommended at this time.    Opportunities for ongoing education and support in diabetes-self management were discussed.    PLAN:  Medication Adjustments:  -- Start or change to carbohydrate ratio at meals: change carb ratio at breakfast from 2 to 1.5.  Give mealtime dose closer to 30 min before eating.   -- Increase Tresiba 32 to 33 unit(s) per day (she feels most comfortable raising it  a little at a time).  Follow Up:  -- Follow-up in 3-4 days.     Topics to cover at upcoming visits: review glucose  and insulin adjustments    See Care Plan for co-developed, patient-stated behavior change goals.    Education Materials Provided:  No new materials provided today    Subjective/Objective  Presents for: Follow-up  Accompanied by: Self  Diabetes education in the past 24mo: (Patient-Rptd) Yes  Diabetes type: (Patient-Rptd) Type 1  Disease course: (Patient-Rptd) Other (see Comments)  Please elaborate:: (Patient-Rptd) Im pregnant andbit changes constantly  How confident are you filling out medical forms by yourself:: (Patient-Rptd) Extremely  Diabetes management related comments/concerns: (Patient-Rptd) No  Cultural Influences/Ethnic Background:  Choose not to answer      Diabetes Symptoms & Complications  Diabetes Related Symptoms: (Patient-Rptd) None  Weight trend: (Patient-Rptd) Increasing  Symptom course: (Patient-Rptd) Stable  Disease course: (Patient-Rptd) Other (see Comments)  Please elaborate:: (Patient-Rptd) Im pregnant andbit changes constantly     Patient Problem List and Family Medical History reviewed for relevant medical history, current medical status, and diabetes risk factors.    Vitals:  LMP 09/16/2024     Wt Readings from Last 8 Encounters:   05/02/25 87.6 kg (193 lb 1.6 oz)   04/16/25 86.5 kg (190 lb 11.2 oz)   04/03/25 85.7 kg (189 lb)   02/25/25 83.9 kg (185 lb)   01/28/25 82.6 kg (182 lb)   12/11/24 80.8 kg (178 lb 3.2 oz)   11/19/24 77.1 kg (170 lb)   11/13/24 78.2 kg (172 lb 6.4 oz)       Estimated Date of Delivery: Jun 23, 2025    Labs:  Lab Results   Component Value Date    A1C 6.7 02/25/2025    A1C 8.0 12/10/2020     Lab Results   Component Value Date     12/11/2024    GLC 82 03/22/2022     12/10/2020     Lab Results   Component Value Date    LDL 79 12/10/2020     HDL Cholesterol   Date Value Ref Range Status   12/10/2020 58 >49 mg/dL Final     GFR Estimate   Date Value Ref Range Status   12/11/2024 >90 >60 mL/min/1.73m2 Final     Comment:     eGFR calculated using 2021 CKD-EPI  equation.   12/10/2020 >90 >60 mL/min/[1.73_m2] Final     Comment:     Non  GFR Calc  Starting 12/18/2018, serum creatinine based estimated GFR (eGFR) will be   calculated using the Chronic Kidney Disease Epidemiology Collaboration   (CKD-EPI) equation.       GFR Estimate If Black   Date Value Ref Range Status   12/10/2020 >90 >60 mL/min/[1.73_m2] Final     Comment:      GFR Calc  Starting 12/18/2018, serum creatinine based estimated GFR (eGFR) will be   calculated using the Chronic Kidney Disease Epidemiology Collaboration   (CKD-EPI) equation.       Lab Results   Component Value Date    CR 0.67 12/11/2024    CR 0.78 12/10/2020     Lab Results   Component Value Date    MICROL <12.0 03/21/2023    UMALCR  03/21/2023      Comment:      Unable to calculate, urine albumin and/or urine creatinine is outside detectable limits.  Microalbuminuria is defined as an albumin:creatinine ratio of 17 to 299 for males and 25 to 299 for females. A ratio of albumin:creatinine of 300 or higher is indicative of overt proteinuria.  Due to biologic variability, positive results should be confirmed by a second, first-morning random or 24-hour timed urine specimen. If there is discrepancy, a third specimen is recommended. When 2 out of 3 results are in the microalbuminuria range, this is evidence for incipient nephropathy and warrants increased efforts at glucose control, blood pressure control, and institution of therapy with an angiotensin-converting-enzyme (ACE) inhibitor (if the patient can tolerate it).      UCRR 20.8 12/11/2024       Last 3 BP:   BP Readings from Last 3 Encounters:   05/02/25 127/79   04/16/25 108/73   04/03/25 126/74       History   Smoking Status    Never   Smokeless Tobacco    Never         5/12/2025   Healthy Eating   Healthy Eating Assessed Today Yes   Cultural/Rastafarian diet restrictions? No   Meal planning/habits Carb counting   How many times a week on average do you eat food  made away from home (restaurant/take-out)? 1   Meals include Breakfast;Lunch;Dinner   Breakfast lower carb protein shake with banana in it and milk in it   Lunch aims for 45-60 grams of carb   Dinner fish tacos or pasta or caesar with bread   Beverages Water;Coffee     45-60 grams of carb but sometimes higher.        5/12/2025   Being Active   Being Active Assessed Today Yes   Exercise: Yes   Days per week of moderate to strenuous exercise (like a brisk walk) 4   On average, minutes per day of exercise at this level 20   How intense was your typical exercise?  Light (like stretching or slow walking)   Exercise Minutes per Week 80   Barrier to exercise Other         5/12/2025   Monitoring   Monitoring Assessed Today Yes   Blood Glucose Meter CGM   Times checking blood sugar at home (number) 5+   Times checking blood sugar at home (per) Day     Diabetes Medication(s)       Insulin       insulin aspart (NOVOPEN ECHO) 100 UNIT/ML cartridge Use 1 unit for 6 grams of carb and 1 unit to drop 40 over 150 up to 50 units a day     insulin degludec (TRESIBA FLEXTOUCH) 100 UNIT/ML pen Inject 30 Units subcutaneously daily.     INSULIN GLARGINE 100 UNIT/ML pen INJECT 23 UNITS SUBCUTANEOUS AT BEDTIME     insulin lispro (HUMALOG KWIKPEN) 100 UNIT/ML (1 unit dial) KWIKPEN Use  1 unit for 6 g CHO with meals  up to 30 units a day     insulin lispro (HUMALOG PENFILL) 100 UNIT/ML Cartridge Directed up to 80 units a day.     NOVOLOG FLEXPEN 100 UNIT/ML soln Use 1 unit for 7 grams up to total of 30 units per day     SEMGLEE, YFGN, 100 UNIT/ML SOPN Inject 23 Units Subcutaneous daily              5/12/2025   Taking Medications   Taking Medication Assessed Today Yes   Current Treatments Insulin Injections         5/12/2025   Problem Solving   Problem Solving Assessed Today Yes   Is the patient at risk for hypoglycemia? Yes   Hypoglycemia Frequency Weekly           5/12/2025   Reducing Risks   Reducing Risks Assessed Today No   Has dilated  eye exam at least once a year? No   Sees dentist every 6 months? No   Feet checked by healthcare provider in the last year? Yes         5/12/2025   Healthy Coping: Diabetes Distress Assessment   Healthy Coping Assessed Today Yes   I feel burned out by all of the attention and effort that diabetes demands of me. 2 - A Little Problem   It bothers me that diabetes seems to control my life. 2 - A Little Problem   I am frustrated that even when I do what I am supposed to for my diabetes, it doesn't seem to make a difference. 2 - A Little Problem   No matter how hard I try with my diabetes, it feels like it will never be good enough. 1 - Not a Problem   I am so tired of having to worry about diabetes all the time. 2 - A Little Problem   When it comes to my diabetes, I often feel like a failure. 2 - A Little Problem   It depresses me when I realize that my diabetes will likely never go away. 1 - Not a Problem   Living with diabetes is overwhelming for me. 1 - Not a Problem   T2 DDAS Total Score (0 - 1.9 Little or no DD, 2.0 - 2.9 Moderate DD,  3.0+ High DD) 1.6    Informal Support system: Family;Friends;Spouse       Patient-reported         Care Plan and Education Provided:  Taking Medication: Giving mealtime insulin sooner before eating.   Importance of regular follow up and weekly touchpoints.   Reviewed insulin resistance in pregnancy.     Azra Bustamante, RUPERTO, REJI, [facility]  Time Spent: 40 minutes  Encounter Type: Individual    Diabetes medication dose changes were made via the [facility] Standing Orders under the patient's referring provider.

## 2025-05-12 NOTE — PATIENT INSTRUCTIONS
Change the carb ratio at  breakfast from 2 to 1.5.      Try giving your injection for meals 30 min before eating.     Increase Tresiba to 33 unit(s) per day.  If fasting blood sugar remains >95 in the morning, increase to 34 unit(s) per day in 4 days.

## 2025-05-13 ENCOUNTER — OFFICE VISIT (OUTPATIENT)
Dept: MATERNAL FETAL MEDICINE | Facility: CLINIC | Age: 30
End: 2025-05-13
Attending: OBSTETRICS & GYNECOLOGY
Payer: COMMERCIAL

## 2025-05-13 ENCOUNTER — HOSPITAL ENCOUNTER (OUTPATIENT)
Dept: ULTRASOUND IMAGING | Facility: CLINIC | Age: 30
Discharge: HOME | End: 2025-05-13
Attending: OBSTETRICS & GYNECOLOGY | Admitting: OBSTETRICS & GYNECOLOGY
Payer: COMMERCIAL

## 2025-05-13 ENCOUNTER — RESULTS FOLLOW-UP (OUTPATIENT)
Dept: OBGYN | Facility: CLINIC | Age: 30
End: 2025-05-13

## 2025-05-13 DIAGNOSIS — O24.013 TYPE 1 DIABETES MELLITUS AFFECTING PREGNANCY IN THIRD TRIMESTER, ANTEPARTUM: Primary | ICD-10-CM

## 2025-05-13 DIAGNOSIS — O24.012 TYPE 1 DIABETES MELLITUS DURING PREGNANCY IN SECOND TRIMESTER: ICD-10-CM

## 2025-05-13 DIAGNOSIS — O40.3XX0 POLYHYDRAMNIOS IN THIRD TRIMESTER COMPLICATION, SINGLE OR UNSPECIFIED FETUS: ICD-10-CM

## 2025-05-13 PROCEDURE — 76819 FETAL BIOPHYS PROFIL W/O NST: CPT

## 2025-05-13 NOTE — PROGRESS NOTES
"Please see \"Imaging\" tab under \"Chart Review\" for details of today's visit.    Dayton Cowan    "

## 2025-05-13 NOTE — NURSING NOTE
"Patient reports positive fetal movement.  Reports blood sugar values \"better\" after adjusting dose last week. Education provided to patient on today's ultrasound.  SBAR given to COCO HANNAH, see their note in Epic.      "

## 2025-05-15 ENCOUNTER — VIRTUAL VISIT (OUTPATIENT)
Dept: EDUCATION SERVICES | Facility: CLINIC | Age: 30
End: 2025-05-15
Payer: COMMERCIAL

## 2025-05-15 DIAGNOSIS — E10.9 TYPE 1 DIABETES MELLITUS WITHOUT COMPLICATION (H): ICD-10-CM

## 2025-05-15 RX ORDER — INSULIN DEGLUDEC 100 U/ML
34 INJECTION, SOLUTION SUBCUTANEOUS DAILY
Qty: 15 ML | Refills: 3 | Status: SHIPPED | OUTPATIENT
Start: 2025-05-15

## 2025-05-15 NOTE — PROGRESS NOTES
Type 1 Diabetes Follow-up in Pregnancy    Type of service:  Video Visit    If the video visit is dropped, the video visit invitation should be resent by: Text to cell phone: [phone]    Originating Location (pt. Location): Other work  Distant Location (provider location): Offsite  Mode of Communication:  Video Conference via [employer]    Video Start Time: 12:00 pm  Video End Time (time video stopped): 12:17 pm    How would patient like to obtain AVS? MyChart      Subjective/Objective:    Isaiah Jarquin sent in blood glucose log for review. Last date of communication was: 5/12/25.    T1 diabetes is being managed with medications    Taking diabetes medications: yes:     Diabetes Medication(s)       Insulin       insulin degludec (TRESIBA FLEXTOUCH) 100 UNIT/ML pen Inject 33 Units subcutaneously daily.     insulin lispro (HUMALOG PENFILL) 100 UNIT/ML Cartridge Directed up to 80 units a day.     insulin aspart (NOVOPEN ECHO) 100 UNIT/ML cartridge Use 1 unit for 6 grams of carb and 1 unit to drop 40 over 150 up to 50 units a day     INSULIN GLARGINE 100 UNIT/ML pen INJECT 23 UNITS SUBCUTANEOUS AT BEDTIME     Patient not taking: Reported on 5/15/2025     insulin lispro (HUMALOG KWIKPEN) 100 UNIT/ML (1 unit dial) KWIKPEN Use  1 unit for 6 g CHO with meals  up to 30 units a day     NOVOLOG FLEXPEN 100 UNIT/ML soln Use 1 unit for 7 grams up to total of 30 units per day     SEMGLEE, YFGN, 100 UNIT/ML SOPN Inject 23 Units Subcutaneous daily          *Takes Tresiba and Humalog penfill with Inpen.    Estimated Date of Delivery: Jun 23, 2025    Heriberto Report past 7 days:             Assessment:  Currently taking:  Tresiba 33 unit(s) (was increased on 5/12)  Humalog penfill for inpen but but no data saved in her inpen report.  Carb ratio of 1:1.5 breakfast and 1:3 rest of the day   Correction of 1:25 >120 mg/dL    Trying to give mealtime injection 30 min before meals since our visit on 5/12. Bday yesterday. Had pizza  last night and then leftovers for breakfast.      Next US at 36 weeks. LGA 32 weeks 98%.     Praised her on her time in range improvement this past week.  TIR of  mg/dL was 34% on 5/12 (for the previous 2 weeks) and is up to 57% today for the past 1 week. Nocturnal glucose remains above target. Would benefit from raising Tresiba. She is very aware of her glucose midday when it dips down and is proactive with a snack if needed.  She feels most comfortable raising this dose 1 unit(s) and then can increase again on Sunday if BG at night remains above target.     Plan/Response:  Recommend increase to insulin - increase Tresiba 33 to 34 unit(s). If FBS remains 95 or over the next 3 days, increase to 35 unit(s).  Continue to take mealtime injection 30 min before eating.   Would likely benefit from a stronger carb ratio at breakfast and possibly dinner at next visit. No change today as she just changed on Tues 5/13 and then it was her birthday yesterday.   Follow up on Monday 5/19 scheduled.    Azra Bustamante, RUPERTO, LD, TIFFANYES  Time spent 17 min      Any diabetes medication dose changes were made via the CDE Protocol and Collaborative Practice Agreement with the patient's endocrinology provider. A copy of this encounter was shared with the provider.

## 2025-05-15 NOTE — PATIENT INSTRUCTIONS
Increase Tresiba to 34 unit(s) per day.  If fasting blood sugar remains >/= 95 mg/dL the next 3 days, increase to 35 unit(s).     Continue to take your mealtime dose 30 min before eating.

## 2025-05-15 NOTE — LETTER
5/15/2025         RE: Isaiah Jarquin  [address] [address]        Dear Colleague,    Thank you for referring your patient, Isaiah Jarquin, to the [facility] [place]. Please see a copy of my visit note below.      Type 1 Diabetes Follow-up in Pregnancy    Type of service:  Video Visit    If the video visit is dropped, the video visit invitation should be resent by: Text to cell phone: [phone]    Originating Location (pt. Location): Other work  Distant Location (provider location): Offsite  Mode of Communication:  Video Conference via [employer] Start Time: 12:00 pm  Video End Time (time video stopped): 12:17 pm    How would patient like to obtain AVS? MyChart      Subjective/Objective:    Isaiah Jarquin sent in blood glucose log for review. Last date of communication was: 5/12/25.    Gestational diabetes is being managed with medications    Taking diabetes medications: yes:     Diabetes Medication(s)       Insulin       insulin degludec (TRESIBA FLEXTOUCH) 100 UNIT/ML pen Inject 33 Units subcutaneously daily.     insulin lispro (HUMALOG PENFILL) 100 UNIT/ML Cartridge Directed up to 80 units a day.     insulin aspart (NOVOPEN ECHO) 100 UNIT/ML cartridge Use 1 unit for 6 grams of carb and 1 unit to drop 40 over 150 up to 50 units a day     INSULIN GLARGINE 100 UNIT/ML pen INJECT 23 UNITS SUBCUTANEOUS AT BEDTIME     Patient not taking: Reported on 5/15/2025     insulin lispro (HUMALOG KWIKPEN) 100 UNIT/ML (1 unit dial) KWIKPEN Use  1 unit for 6 g CHO with meals  up to 30 units a day     NOVOLOG FLEXPEN 100 UNIT/ML soln Use 1 unit for 7 grams up to total of 30 units per day     SEMGLEE, YFGN, 100 UNIT/ML SOPN Inject 23 Units Subcutaneous daily          *Takes Tresiba and Humalog penfill with Inpen.    Estimated Date of Delivery: Jun 23, 2025    Heriberto Report past 7 days:             Assessment:  Currently taking:  Tresiba 33 unit(s) (was  increased on 5/12)  Humalog penfill for inpen but but no data saved in her inpen report.  Carb ratio of 1:1.5 breakfast and 1:3 rest of the day   Correction of 1:25 >120 mg/dL    Trying to give mealtime injection 30 min before meals since our visit on 5/12. Bday yesterday. Had pizza last night and then leftovers for breakfast.      Next US at 36 weeks. LGA 32 weeks 98%.     Praised her on her time in range improvement this past week.  TIR of  mg/dL was 34% on 5/12 (for the previous 2 weeks) and is up to 57% today for the past 1 week. Nocturnal glucose remains above target. Would benefit from raising Tresiba. She is very aware of her glucose midday when it dips down and is proactive with a snack if needed.  She feels most comfortable raising this dose 1 unit(s) and then can increase again on Sunday if BG at night remains above target.     Plan/Response:  Recommend increase to insulin - increase Tresiba 33 to 34 unit(s). If FBS remains 95 or over the next 3 days, increase to 35 unit(s).  Continue to take mealtime injection 30 min before eating.   Would likely benefit from a stronger carb ratio at breakfast and possibly dinner at next visit. No change today as she just changed on Tues 5/13 and then it was her birthday yesterday.   Follow up on Monday 5/19 scheduled.    Azra Bustamante, JAYLEENN, LD, CDCES  Time spent 17 min      Any diabetes medication dose changes were made via the CDE Protocol and Collaborative Practice Agreement with the patient's endocrinology provider. A copy of this encounter was shared with the provider.

## 2025-05-16 ENCOUNTER — RESULTS FOLLOW-UP (OUTPATIENT)
Dept: OBGYN | Facility: CLINIC | Age: 30
End: 2025-05-16

## 2025-05-16 ENCOUNTER — HOSPITAL ENCOUNTER (OUTPATIENT)
Dept: ULTRASOUND IMAGING | Facility: CLINIC | Age: 30
Discharge: HOME | End: 2025-05-16
Attending: OBSTETRICS & GYNECOLOGY | Admitting: OBSTETRICS & GYNECOLOGY
Payer: COMMERCIAL

## 2025-05-16 DIAGNOSIS — O24.012 TYPE 1 DIABETES MELLITUS DURING PREGNANCY IN SECOND TRIMESTER: ICD-10-CM

## 2025-05-16 PROCEDURE — 76819 FETAL BIOPHYS PROFIL W/O NST: CPT

## 2025-05-19 ENCOUNTER — VIRTUAL VISIT (OUTPATIENT)
Dept: EDUCATION SERVICES | Facility: CLINIC | Age: 30
End: 2025-05-19
Payer: COMMERCIAL

## 2025-05-19 DIAGNOSIS — O24.019 PRE-EXISTING TYPE 1 DIABETES AFFECTING PREGNANCY, ANTEPARTUM: Primary | ICD-10-CM

## 2025-05-19 PROCEDURE — 99207 PR NO BILLABLE SERVICE THIS VISIT: CPT | Mod: 95

## 2025-05-19 NOTE — Clinical Note
Andre!   I met with Isaiah for the first time today.  She wasn't ready to make any insulin adjustments today.  From my review, she's made some great progress recently!  I am following up with her again on Thursday.  Please let me know if you have any questions/concerns.  Thank you,  Rose Peters, MPH, RD, CDCES, LD 5/19/2025

## 2025-05-19 NOTE — PROGRESS NOTES
Diabetes and Pregnancy Follow-up  Type of Service: Telephone Visit/ 26 minutes     Originating Location (Patient Location): Home  Distant Location (Provider Location): [facility]  Mode of Communication:  Video     Visit Start Time: 8:03 AM  Visit End Time: 8:29 AM     How would patient like to obtain AVS? Lavelle      Subjective/Objective:    Isaiah Jarquin was called for a scheduled BG review. Last date of communication was: 5/15/25.    Type 1 diabetes in pregnancy is being managed with medications    Taking diabetes medications: yes:     Diabetes Medication(s)       Insulin       insulin degludec (TRESIBA FLEXTOUCH) 100 UNIT/ML pen Inject 34 Units subcutaneously daily (since 5/15/25)     insulin lispro (HUMALOG PENFILL) 100 UNIT/ML Cartridge Directed up to 80 units a day. (ICR = 1:1.5 breakfast and 1:3 lunch and dinner. CF = >120 mg/dL at each meal            Estimated Date of Delivery: 2025    Blood Glucose/Ketone Lo/18 dinner = large portion of sugar cereal (not typical)      Patient did not send InPen report for review at visit today.      Assessment:  Patient IS meeting the following  glucose goals for pregnancy with most recent CGM data:   -time in  mg/dL target range of above 70%   -Time below 63 mg/dL of less than 4%   -Time below 54 mg/dL of less than 1%  Patient IS NOT meeting the following glucose goal for pregnancy   -Time above 140 mg/dL of less than 25%  Patient attributes low glucose midday on 25 to waiting too long to eat after Humalog U100 injection and possibly giving too much Humalog U100.  She is unsure about the reason for the low overnight -.  Since last visit midday on 5/15/25, with available data, appears 67% post breakfast, 33% post lunch, and 25-50% post dinner glucose in target.  Of note the post lunch elevation on 25 appears to follow treatment of a low glucose. She is not yet ready to increase insulin doses; she wants to give  it a bit more time before increasing Tresiba U100 to 35 units/day and before adjusting ICR at dinner meal to 1:2.5.    Patient reports she has only been able to get 2 pens of Tresiba U100 insulin at a time which at current dose is only enough for about 17 days.  Reviewed chart and prescription is written for patient to receive an entire box (5 pens or enough for about 44 day) at a time.  Encouraged patient to check with pharmacy to see if it is a supply issue as well as to check with insurance to see if they have parameters/restrictions such as needing to use a mail order pharmacy for a larger supply.  Writer checked with pharmacy liaisons following visit and a Prior Auth is not needed for Tresiba U100.  See 1Ring message sent to patient dated today, 5/19/25.     Plan/Response:  If fasting glucose remains above 94 mg/dL, increase Tresiba U100 to 35 units/day.   If 1 hour post dinner glucose (timer starts with the first bite of food) remains above 140 mg/dL, change insulin to carbohydrate ratio at dinner to 1 unit Humalog U100 to each 2.5 grams carbohydrate  Contact your pharmacy to see why they cannot provide at least 3 Tresiba U100 pens at a time (this is under a 1 month supply).  Also contact your insurance to see if you can have a 3 month supply filled somewhere, perhaps mail order?   Virtual follow up with Rose on 5/22/25 at 10:15 AM  5.  Please call or send a 1Ring message with any questions or concerns, any low blood sugars, and/or 3 or more blood sugars above target.    Rose Peters, MPH, RD, CDCES, LD 5/19/2025    Time Spent: 26 minutes    Any diabetes medication dose changes were made via the CDE Protocol and Collaborative Practice Agreement with the patient's referring provider. A copy of this encounter was shared with the provider.

## 2025-05-20 ENCOUNTER — HOSPITAL ENCOUNTER (OUTPATIENT)
Dept: ULTRASOUND IMAGING | Facility: CLINIC | Age: 30
Discharge: HOME | End: 2025-05-20
Attending: OBSTETRICS & GYNECOLOGY | Admitting: OBSTETRICS & GYNECOLOGY
Payer: COMMERCIAL

## 2025-05-20 ENCOUNTER — OFFICE VISIT (OUTPATIENT)
Dept: MATERNAL FETAL MEDICINE | Facility: CLINIC | Age: 30
End: 2025-05-20
Attending: OBSTETRICS & GYNECOLOGY
Payer: COMMERCIAL

## 2025-05-20 DIAGNOSIS — O40.3XX0 POLYHYDRAMNIOS IN THIRD TRIMESTER COMPLICATION, SINGLE OR UNSPECIFIED FETUS: ICD-10-CM

## 2025-05-20 DIAGNOSIS — O24.012 TYPE 1 DIABETES MELLITUS DURING PREGNANCY IN SECOND TRIMESTER: ICD-10-CM

## 2025-05-20 DIAGNOSIS — O24.013 PREGNANCY COMPLICATED BY PRE-EXISTING TYPE 1 DIABETES, THIRD TRIMESTER: Primary | ICD-10-CM

## 2025-05-20 PROCEDURE — 76819 FETAL BIOPHYS PROFIL W/O NST: CPT

## 2025-05-20 NOTE — PROGRESS NOTES
Virtual Visit Details    Type of service:  Video Visit     Originating Location (pt. Location): Home    Distant Location (provider location):  Off-site  Platform used for Video Visit: Abbey    Outcome for 25 11:35 AM: Data uploaded on Snaptee  Clifton Torrez MA  Outcome for 25 11:35 AM: Intrusic message sent requesting inpen data  Clifton Torrez MA  Outcome for 25 12:19 PM: Replied to Intrusic message  Evelin Valdovinos MA  Outcome for 25 8:29 AM: Patient will send inpen report via Markerly before appointment.  Clifton Torrez MA  Outcome for 25 8:22 AM: Replied to Intrusic message  Clifton Torrez MA  Outcome for 25 8:24 AM: Data obtained via Snaptee website. Patient sent inpen report via Markerly.  Clifton Torrez MA    Reduce tresiba more? Took 22 units lantus before pregnancy and 1 per 7 gm    This 30-year-old woman seen for f/u of her type 1 diabetes.  She was diagnosed in . She is now 37 wks pregnant. She is  and  has an  LIZZ: 2025, by Last Menstrual Period.  She is scheduled for  on 2025    Isaiah has been working very hard at controlling her blood sugars.  She has been connecting with different members of the team every week or 2 throughout her pregnancy.  Charles is using a perla sensor and an InPen device.  She uses the InPen device to record insulin but does not use it to calculate her doses.  In recent weeks, she has been having problems with hypoglycemia and her insulin doses have been reduced.  Last night she reduced her Tresiba dose to 29 because of hypoglycemia.  She is bolusing 1 unit for 2 g of carb at breakfast, 1 unit for 3 g of carb at lunch and 1 for 3.5 at dinner.  She is recognizing her hypoglycemia without any particular problem.  She is eager for the baby to be born.  She has many questions about insulin dosing after delivery.    She has not seen her eye doctor yet but will make an appointment.  She denies chest pain or shortness of breath.  She just feels  very big and uncomfortable.                            Current Outpatient Medications   Medication Sig Dispense Refill    aspirin 81 MG EC tablet Take 81 mg by mouth daily.      blood glucose (NO BRAND SPECIFIED) lancets standard Use to test blood sugar 4 times daily or as directed. 100 each 4    blood glucose (NO BRAND SPECIFIED) test strip Use to test blood sugar 3 times daily or as directed. 50 strip 11    Continuous Glucose Sensor (FREESTYLE BIGG 3 PLUS SENSOR) MISC Use 1 sensor every 15 days. Use to read blood sugars per 's instructions. 6 each 3    Glucagon (BAQSIMI TWO PACK) 3 MG/DOSE nasal powder Spray 1 spray (3 mg) in nostril as needed for severe hypoglycemia. May repeat dose if no response after 15 minutes. 1 each 3    Injection Device for insulin (INPEN 100-GREY-YESICA-HUMALOG) RAJIV 1 each daily. 1 each 3    Injection Device for insulin (INPEN 100-GREY-NOVOLOG-FIASP) RAJIV 1 each daily. (Patient not taking: Reported on 5/30/2025) 1 each 1    insulin aspart (NOVOPEN ECHO) 100 UNIT/ML cartridge Use 1 unit for 6 grams of carb and 1 unit to drop 40 over 150 up to 50 units a day (Patient not taking: Reported on 5/30/2025) 12 mL 3    insulin degludec (TRESIBA FLEXTOUCH) 100 UNIT/ML pen Inject 30 Units subcutaneously daily. 15 mL 3    INSULIN GLARGINE 100 UNIT/ML pen INJECT 23 UNITS SUBCUTANEOUS AT BEDTIME (Patient not taking: Reported on 5/30/2025)      insulin lispro (HUMALOG KWIKPEN) 100 UNIT/ML (1 unit dial) KWIKPEN Use  1 unit for 6 g CHO with meals  up to 30 units a day (Patient not taking: Reported on 5/30/2025) 15 mL 2    insulin lispro (HUMALOG PENFILL) 100 UNIT/ML Cartridge Directed up to 80 units a day. 24 mL 5    insulin pen needle (32G X 4 MM) 32G X 4 MM miscellaneous Use 5 pen needles daily or as directed. 400 each 3    NOVOLOG FLEXPEN 100 UNIT/ML soln Use 1 unit for 7 grams up to total of 30 units per day (Patient not taking: Reported on 5/30/2025) 30 mL 3    SEMGLEE, YFGN, 100 UNIT/ML  SOPN Inject 23 Units Subcutaneous daily (Patient not taking: Reported on 5/30/2025) 15 mL 3    triamcinolone (KENALOG) 0.1 % external lotion Apply topically 3 times daily. 60 mL 2     No current facility-administered medications for this visit.         On exam she is in no acute distress.    Recent Labs   Lab Test 05/16/25  1027 02/25/25  1506 12/11/24  1036 03/18/24  1432 10/11/23  1055 03/21/23  1214 03/21/23  1123 03/22/22  1026 12/10/20  1000 12/10/20  0953   A1C 7.5* 6.7* 6.9* 7.6* 8.1*  --    < >  --    < > 8.0*   TSH  --   --  0.83  --  0.78  --   --   --   --   --    LDL  --   --   --   --   --   --   --   --   --  79   HDL  --   --   --   --   --   --   --   --   --  58   TRIG  --   --   --   --   --   --   --   --   --  90   CR  --   --  0.67 0.83 0.78  --    < >  --    < >  --    MICROL  --   --   --   --   --  <12.0  --  <5   < >  --     < > = values in this interval not displayed.      Assessment and plan:    1.  Diabetes control.  Charles has had much better glucose control in recent weeks.  She has had to reduce her insulin doses because of hypoglycemia.  She has not had any serious episodes of hypoglycemia.  I told her I agreed with her action to reduce the Tresiba by 1 unit yesterday.  I encouraged her to continue to take that same 29 units until she delivers.  We talked about a plan for postdelivery.  She was taking 19 units of Tresiba before she was pregnant so she will start with that dose.  She was bolusing 1 unit per 7 g of carb before she was pregnant.  She will start with that.  She is planning to breast-feed.  I encouraged her to reach out if she has any specific problems adjusting her insulin doses.  I think it is optimal if she is allowed to manage her insulin doses herself while she is in the hospital.  We have developed a plan together and she is ready to implement.    2.  Diabetes complications.  She needs to see the eye doctor.  She does not have a history of retinopathy but I am  concerned that something could have changed during pregnancy.  Will check her renal function in the postpartum..  Her feet are fine.    I will schedule her to see Kimberly Barrett in about 3 months and me in about 6 months.    I spent 20 minutes on the video visit with the patient today (start time 2: 3 0 and end time 2: 5 0).  On the same day of visit I spent an additional 10 minutes reviewing her chart, reviewing her labs, and doing documentation.  This time was exclusive of the time I spent reviewing and interpreting her continuous glucose monitor that shows she is frequently low.    Lola Butler MD

## 2025-05-20 NOTE — NURSING NOTE
Isaiah Jarquin is here for ultrasound today. Denies questions or concerns today. Patient reports positive fetal movement, denies contractions, leaking of fluid, or bleeding.  Reports blood sugar values fasting and hr post prandial wnl. Education provided to patient on today's ultrasound.  SBAR given to COCO HANNAH, see their note in Epic.

## 2025-05-20 NOTE — PATIENT INSTRUCTIONS
If fasting glucose remains above 94 mg/dL, increase Tresiba U100 to 35 units/day.     If 1 hour post dinner glucose (timer starts with the first bite of food) remains above 140 mg/dL, change insulin to carbohydrate ratio at dinner to 1 unit Humalog U100 to each 2.5 grams carbohydrate    Contact your pharmacy to see why they cannot provide at least 3 Tresiba U100 pens at a time (this is under a 1 month supply).  Also contact your insurance to see if you can have a 3 month supply filled somewhere, perhaps mail order?     Virtual follow up with Rose on 5/22/25 at 10:15 AM    5.  Please call or send a [email or web address] message with any questions or concerns, any low blood sugars, and/or 3 or more blood sugars above target.    Rose Peters, MPH, RD, CDCES, LD 5/19/2025

## 2025-05-21 ENCOUNTER — RESULTS FOLLOW-UP (OUTPATIENT)
Dept: OBGYN | Facility: CLINIC | Age: 30
End: 2025-05-21

## 2025-05-22 ENCOUNTER — VIRTUAL VISIT (OUTPATIENT)
Dept: EDUCATION SERVICES | Facility: CLINIC | Age: 30
End: 2025-05-22
Payer: COMMERCIAL

## 2025-05-22 DIAGNOSIS — O24.019 PRE-EXISTING TYPE 1 DIABETES AFFECTING PREGNANCY, ANTEPARTUM: Primary | ICD-10-CM

## 2025-05-22 PROCEDURE — 98968 PH1 ASSMT&MGMT NQHP 21-30: CPT | Mod: 95

## 2025-05-22 NOTE — Clinical Note
Hello,  This is my first week meeting [place] this pregnancy.  She's starting to have more frequent hypoglycemia.  She decreased her Tresiba U100 by 1 unit (about 3%) as of last night.  It's challenging to assess her data without food records and the InPen report (having connectivity issues I attempting to help her resolve).  Let me know if you have questions/concerns.  I'm continuing frequent follow up with her. Thanks! Rose Peters, MPH, RD, CDCES, LD 5/22/2025

## 2025-05-22 NOTE — PROGRESS NOTES
Diabetes and Pregnancy Follow-up  Type of Service: Telephone Visit/ 29 minutes     Originating Location (Patient Location): Home  Distant Location (Provider Location): [facility]  Mode of Communication:  Telephone     Telephone Visit Start Time: 10:19 AM  Telephone Visit End Time (telephone visit stop time): 10:48 AM     How would patient like to obtain AVS? Lavelle    Subjective/Objective:    Isaiah Jarquin was called for a scheduled BG review. Last date of communication was: 25.    Type 1 diabetes in pregnancy is being managed with medications    Taking diabetes medications: yes:     Diabetes Medication(s)       Insulin       insulin degludec (TRESIBA FLEXTOUCH) 100 UNIT/ML pen Inject 34 Units subcutaneously daily.  Reports she decreased dose last night to 33 units daily.     insulin lispro (HUMALOG PENFILL) 100 UNIT/ML Cartridge Directed up to 80 units a day.     (ICR = 1:1.5 breakfast and 1:3 lunch and dinner. CF = >120 mg/dL at each meal)             Estimated Date of Delivery: 2025    Blood Glucose/Ketone Lo/19 Breakfast = banana bread    : patient questions if post lunch low due to error in counting carbohydrates in lunch (was at work), treated glucose of 74 mg/dL with straight down arrow between 6-7 PM with 1/2 cupcake  then when she later saw glucose dropping she ate another 1/2 cupcake      : Reports no insulin with carbohydrate at 1 AM, low just before 2 AM not due to compression    Issues with InPen data synching, no data from -present    Assessment:  Patient is meeting ALL glucose goals for pregnancy with most recent CGM data:   -time in  mg/dL target range of above 70%   -Time below 63 mg/dL of less than 4%   -Time below 54 mg/dL of less than 1%   -Time above 140 mg/dL of less than 25%    At time of visit with patient today since last visit on 25, patient with 5 instances of hypoglycemia.  Patient independently decreased Tresiba U100 to 33  units/day (about a 3% decrease) as of 5/21/25.  Reviewed with patient that due to the duration of Tresiba U100 it will take couple of days to see the full impact of the decreased dose. Issues with InPen data synching so no InPen data since last visit with writer and thus relying on patient's memory regarding causes of hypoglycemia and hyperglycemia in terms of intake, carbohydrate counting, timing, and amount of insulin dosing.  Patient denies need for tools/help in determining carbohydrates in specific foods.  Recommended instead of treating a dropping, but not low, glucose with only carbohydrate, patient aim for 15 grams carbohydrate + protein to avoid a rise followed by another drop; patient should do this when she has no signs/symptoms of hypoglycemia and glucose is above 63 mg/dL (for example the 74 mg/dL the evening of 5/20/25).  Plan for writer to review CGM data again tomorrow and follow up with patient via [employer].  Initiated discussion that significant decreases in insulin needs can be a sign of placental issues.    RipCode messaged with patient following visit with information from local reps on re-synching InPen to sacha.     RipCode messaged with patient following visit re: difficulty obtaining adequate supply of Tresiba U100.  Ultimately, patient was able to obtain a box (5 pens) of Tresiba U100 from her usual [facility] pharmacy without issue.     Plan/Response:  Patient Instructions   No changes to insulin doses.     2.  Expect RipCode message from [facility] re: obtaining Tresiba U100 and InPen connectivity.    3.  If glucose dropping yet not low (below 63 mg/dL) and you are without symptoms of hypoglycemia, start by having a snack of about 15 grams carbohydrate + protein.  Examples:    -Stumpedia Protein bar   -1 individual container Chobani Less Sugar Greek yogurt   -a tennis ball size piece of whole fruit with peanut butter and/or string cheese    4.  Keep something with you for treating a low blood  sugar (below 63-80 mg/dL).  If your blood sugar is low, eat/drink ONE of the following:   -1/2 cup juice  -1/2 cup regular soda  -1 package fruit snacks  -4 glucose tablets  Then, wait 15 minutes and re-check blood sugar.  If it is not above 70 mg/dL, repeat the above.    5.  Rose will review glucose data and send Results [employer] message tomorrow, 5/23/25.     6.  Virtual follow up with Rose on Tuesday, 5/27/25 at 4:15 PM.    7. Please call or send a Results [employer] message with any questions or concerns, any low blood sugars, or 3 or more glucoses above target.    Rose Peters, MPH, RD, CDCES, LD    Time Spent: 29 minutes    Any diabetes medication dose changes were made via the CDE Protocol and Collaborative Practice Agreement with the patient's referring provider. A copy of this encounter was shared with the provider.

## 2025-05-23 ENCOUNTER — HOSPITAL ENCOUNTER (OUTPATIENT)
Dept: ULTRASOUND IMAGING | Facility: CLINIC | Age: 30
Discharge: HOME | End: 2025-05-23
Attending: OBSTETRICS & GYNECOLOGY | Admitting: OBSTETRICS & GYNECOLOGY
Payer: COMMERCIAL

## 2025-05-23 DIAGNOSIS — O24.012 TYPE 1 DIABETES MELLITUS DURING PREGNANCY IN SECOND TRIMESTER: ICD-10-CM

## 2025-05-23 PROCEDURE — 76819 FETAL BIOPHYS PROFIL W/O NST: CPT

## 2025-05-23 NOTE — PATIENT INSTRUCTIONS
No changes to insulin doses.     2.  Expect "[employer] message from Rose re: obtaining Tresiba U100 and InPen connectivity.    3.  If glucose dropping yet not low (below 63 mg/dL) and you are without symptoms of hypoglycemia, start by having a snack of about 15 grams carbohydrate + protein.  Examples:    -ZapMe Protein bar   -1 individual container Chobani Less Sugar Greek yogurt   -a tennis ball size piece of whole fruit with peanut butter and/or string cheese    4.  Keep something with you for treating a low blood sugar (below 63-80 mg/dL).  If your blood sugar is low, eat/drink ONE of the following:   -1/2 cup juice  -1/2 cup regular soda  -1 package fruit snacks  -4 glucose tablets  Then, wait 15 minutes and re-check blood sugar.  If it is not above 70 mg/dL, repeat the above.    5.  Rose will review glucose data and send "[employer] message tomorrow, 25.     6.  Virtual follow up with Rose on Tuesday, 25 at 4:15 PM.    7. Please call or send a "[employer] message with any questions or concerns, any low blood sugars, or 3 or more glucoses above target.    Rose Peters, MPH, RD, ALICE LD  Diabetes Education Triage Line: [phone]  Diabetes Education Appointment [phone]

## 2025-05-27 ENCOUNTER — VIRTUAL VISIT (OUTPATIENT)
Dept: EDUCATION SERVICES | Facility: CLINIC | Age: 30
End: 2025-05-27
Payer: COMMERCIAL

## 2025-05-27 ENCOUNTER — OFFICE VISIT (OUTPATIENT)
Dept: MATERNAL FETAL MEDICINE | Facility: CLINIC | Age: 30
End: 2025-05-27
Attending: OBSTETRICS & GYNECOLOGY
Payer: COMMERCIAL

## 2025-05-27 ENCOUNTER — HOSPITAL ENCOUNTER (OUTPATIENT)
Dept: ULTRASOUND IMAGING | Facility: CLINIC | Age: 30
Discharge: HOME | End: 2025-05-27
Attending: OBSTETRICS & GYNECOLOGY
Payer: COMMERCIAL

## 2025-05-27 DIAGNOSIS — O40.3XX0 POLYHYDRAMNIOS IN THIRD TRIMESTER COMPLICATION, SINGLE OR UNSPECIFIED FETUS: Primary | ICD-10-CM

## 2025-05-27 DIAGNOSIS — O24.013 TYPE 1 DIABETES MELLITUS AFFECTING PREGNANCY IN THIRD TRIMESTER, ANTEPARTUM: ICD-10-CM

## 2025-05-27 DIAGNOSIS — E10.9 TYPE 1 DIABETES MELLITUS WITHOUT COMPLICATION (H): ICD-10-CM

## 2025-05-27 DIAGNOSIS — O24.019 PRE-EXISTING TYPE 1 DIABETES AFFECTING PREGNANCY, ANTEPARTUM: Primary | ICD-10-CM

## 2025-05-27 PROCEDURE — 76819 FETAL BIOPHYS PROFIL W/O NST: CPT

## 2025-05-27 PROCEDURE — G0108 DIAB MANAGE TRN  PER INDIV: HCPCS | Mod: 95 | Performed by: DIETITIAN, REGISTERED

## 2025-05-27 PROCEDURE — 99212 OFFICE O/P EST SF 10 MIN: CPT | Performed by: OBSTETRICS & GYNECOLOGY

## 2025-05-27 NOTE — PROGRESS NOTES
The patient was seen for an ultrasound in the [facility] at the [facility] today.  For a detailed report of the ultrasound examination, please see the ultrasound report which can be found under the imaging tab.    If you have questions regarding today's evaluation or if we can be of further service, please contact the [facility].    Telma Norris MD  , OB/GYN  Maternal-Fetal Medicine  [phone] (Pager)

## 2025-05-27 NOTE — NURSING NOTE
Patient reports positive fetal movement, no pain, denies contractions, leaking of fluid, or bleeding.  Reports no changes in blood sugar regimen since last visit.  Patient denies headache, visual changes, nausea/vomiting, epigastric pain related to preeclampsia.     Pt reports onset of new rash. States started on lower belly/abdomen about 1 week ago and has since spread to her arms. C/o itchiness despite comfort measures (cold compresses, topical ointments). Has not yet discussed with her OB, states has an appointment this coming Friday. Denies any environmental changes (change in soap....increase in hot environment/sweat increase) or any recent illness or fever. Pt showed rash to this writer, highly suggestive of a PUPPP rash. Extends across trunk of lower gravid belly - red papules (no open blisters or sores) - similar markings on bilateral arms. Will discuss with MD.       Education provided to patient on ultrasound.  SBAR given to COCO HANNAH, see their note in Epic.

## 2025-05-27 NOTE — Clinical Note
2025         RE: Isaiah Jarquin  [address] [address]        Dear Colleague,    Thank you for referring your patient, Isaiah Jarquin, to the [facility]. Please see a copy of my visit note below.    Diabetes and Pregnancy Follow-up  Type of Service: Video Visit/ 31 minutes     Originating Location (Patient Location): Home  Distant Location (Provider Location): [place] - [facility]  Mode of Communication:  Video     Video Visit Start Time: 4:20 PM  Video Visit End Time: 4:51 PM     How would patient like to obtain AVS? MyChart    Subjective/Objective:    Isaiah Jarquin was called for a scheduled BG review. Last date of communication was: 25.    Type 1 diabetes in pregnancy is being managed with medications    Taking diabetes medications: yes:     Diabetes Medication(s)       Diabetic Other       Glucagon (BAQSIMI TWO PACK) 3 MG/DOSE nasal powder Spray 1 spray (3 mg) in nostril as needed for severe hypoglycemia. May repeat dose if no response after 15 minutes.       Insulin       insulin degludec (TRESIBA FLEXTOUCH) 100 UNIT/ML pen Inject 34 Units subcutaneously daily. Decreased to 32 units/day on 25     insulin lispro (HUMALOG PENFILL) 100 UNIT/ML Cartridge Directed up to 80 units a day.  (ICR = 1:1.5 breakfast and 1:3 lunch and dinner. CF = >120 mg/dL at each meal)             Estimated Date of Delivery: 2025    Blood Glucose/Ketone Lo/23: Decreased Tresiba U100 dose to 32 units.      : At Cabin for the day (more challenging to count carbohydrates)          : over-treated low at 5:30 AM (felt it), no breakfast                  Assessment:  Patient is meeting ALL glucose goals for pregnancy with most recent CGM data:   -time in  mg/dL target range of above 70%   -Time below 63 mg/dL of less than 4%   -Time below 54 mg/dL of less than 1%   -Time above 140 mg/dL of less than 25%  After much conversation with  patient regarding glucoses, intake, insulin dosing, CGM reports, and InPen reports, no changes were made to diabetes medications. Does appear that basal insulin is causing hypoglycemia; patient most recently decreased dose on 5/23/25. Plan made for further decrease in basal insulin IF she has another overnight low glucose.     Discussed that decreasing insulin doses can be a sign of placental insufficiency, per patient review of Humalog U100 doses on her InPen and reported Tresiba U100 changes, patient's insulin needs have decreased approximately 10% in the past couple of weeks.  Routing to patient's endocrinology and OB teams as updates.     Plan/Response:  Patient Instructions        No changes to insulin doses.  If you have another overnight low glucose, decrease Tresiba U100 dose to 31 units/day.     2.  Keep something with you for treating a low blood sugar (below 63-80 mg/dL).  If your blood sugar is low, eat/drink ONE of the following:   -1/2 cup juice  -1/2 cup regular soda  -1 package fruit snacks  -4 glucose tablets  Then, wait 15 minutes and re-check blood sugar.  If it is not above 70 mg/dL, repeat the above.     3.  Virtual visit with Rose on Thursday, May 29th at 8:30 AM    4.  Please call or send a [employer] message with any questions or concerns, any low blood sugars, or 3 or more glucoses above target.         Rose Peters, MPH, RD, CDCES, LD 5/29/2025  Time Spent: 31 minutes    Any diabetes medication dose changes were made via the CDE Protocol and Collaborative Practice Agreement with the patient's referring provider. A copy of this encounter was shared with the provider.

## 2025-05-27 NOTE — PROGRESS NOTES
Diabetes and Pregnancy Follow-up  Type of Service: Video Visit/ 31 minutes     Originating Location (Patient Location): Home  Distant Location (Provider Location): [place] - [facility]  Mode of Communication:  Video     Video Visit Start Time: 4:20 PM  Video Visit End Time: 4:51 PM     How would patient like to obtain AVS? Lavelle    Subjective/Objective:    Isaiah Jarquin was called for a scheduled BG review. Last date of communication was: 25.    Type 1 diabetes in pregnancy is being managed with medications    Taking diabetes medications: yes:     Diabetes Medication(s)       Diabetic Other       Glucagon (BAQSIMI TWO PACK) 3 MG/DOSE nasal powder Spray 1 spray (3 mg) in nostril as needed for severe hypoglycemia. May repeat dose if no response after 15 minutes.       Insulin       insulin degludec (TRESIBA FLEXTOUCH) 100 UNIT/ML pen Inject 34 Units subcutaneously daily. Decreased to 32 units/day on 25     insulin lispro (HUMALOG PENFILL) 100 UNIT/ML Cartridge Directed up to 80 units a day.  (ICR = 1:1.5 breakfast and 1:3 lunch and dinner. CF = >120 mg/dL at each meal)             Estimated Date of Delivery: 2025    Blood Glucose/Ketone Lo/23: Decreased Tresiba U100 dose to 32 units.      : At Cabin for the day (more challenging to count carbohydrates)          : over-treated low at 5:30 AM (felt it), no breakfast                  Assessment:  Patient is meeting ALL glucose goals for pregnancy with most recent CGM data:   -time in  mg/dL target range of above 70%   -Time below 63 mg/dL of less than 4%   -Time below 54 mg/dL of less than 1%   -Time above 140 mg/dL of less than 25%  After much conversation with patient regarding glucoses, intake, insulin dosing, CGM reports, and InPen reports, no changes were made to diabetes medications. Does appear that basal insulin is causing hypoglycemia; patient most recently decreased dose on 25. Plan made for further decrease  in basal insulin IF she has another overnight low glucose.     Discussed that decreasing insulin doses can be a sign of placental insufficiency, per patient review of Humalog U100 doses on her InPen and reported Tresiba U100 changes, patient's insulin needs have decreased approximately 10% in the past couple of weeks.  Routing to patient's endocrinology and OB teams as updates.     Plan/Response:  Patient Instructions        No changes to insulin doses.  If you have another overnight low glucose, decrease Tresiba U100 dose to 31 units/day.     2.  Keep something with you for treating a low blood sugar (below 63-80 mg/dL).  If your blood sugar is low, eat/drink ONE of the following:   -1/2 cup juice  -1/2 cup regular soda  -1 package fruit snacks  -4 glucose tablets  Then, wait 15 minutes and re-check blood sugar.  If it is not above 70 mg/dL, repeat the above.     3.  Virtual visit with Rose on Thursday, May 29th at 8:30 AM    4.  Please call or send a [employer] message with any questions or concerns, any low blood sugars, or 3 or more glucoses above target.         Rose Peters, MPH, RD, CDCES, LD 5/29/2025  Time Spent: 31 minutes    Any diabetes medication dose changes were made via the CDE Protocol and Collaborative Practice Agreement with the patient's referring provider. A copy of this encounter was shared with the provider.

## 2025-05-28 ENCOUNTER — RESULTS FOLLOW-UP (OUTPATIENT)
Dept: OBGYN | Facility: CLINIC | Age: 30
End: 2025-05-28

## 2025-05-29 ENCOUNTER — VIRTUAL VISIT (OUTPATIENT)
Dept: EDUCATION SERVICES | Facility: CLINIC | Age: 30
End: 2025-05-29
Payer: COMMERCIAL

## 2025-05-29 DIAGNOSIS — E10.9 TYPE 1 DIABETES MELLITUS WITHOUT COMPLICATION (H): ICD-10-CM

## 2025-05-29 RX ORDER — INSULIN DEGLUDEC 100 U/ML
30 INJECTION, SOLUTION SUBCUTANEOUS DAILY
Qty: 15 ML | Refills: 3 | Status: SHIPPED | OUTPATIENT
Start: 2025-05-29

## 2025-05-29 RX ORDER — INSULIN DEGLUDEC 100 U/ML
32 INJECTION, SOLUTION SUBCUTANEOUS DAILY
Qty: 15 ML | Refills: 3 | Status: SHIPPED | OUTPATIENT
Start: 2025-05-29 | End: 2025-05-29

## 2025-05-29 RX ORDER — GLUCAGON 3 MG/1
POWDER NASAL
Qty: 1 EACH | Refills: 3 | Status: SHIPPED | OUTPATIENT
Start: 2025-05-29

## 2025-05-29 NOTE — PATIENT INSTRUCTIONS
No changes to insulin doses.  If you have another overnight low glucose, decrease Tresiba U100 dose to 31 units/day.     2.  Keep something with you for treating a low blood sugar (below 63-80 mg/dL).  If your blood sugar is low, eat/drink ONE of the following:   -1/2 cup juice  -1/2 cup regular soda  -1 package fruit snacks  -4 glucose tablets  Then, wait 15 minutes and re-check blood sugar.  If it is not above 70 mg/dL, repeat the above.     3.  Virtual visit with Rose on Thursday, May 29th at 8:30 AM    4.  Please call or send a [employer] message with any questions or concerns, any low blood sugars, or 3 or more glucoses above target.

## 2025-05-29 NOTE — PATIENT INSTRUCTIONS
Insulin doses:   Decrease Tresiba U100 to 30 units daily  Humalog U100 in InPen   -continue 1 unit for each 1.5 grams of carbohydrate before breakfast   -continue 1 unit for each 3 grams of carbohydrate before lunch   -change to 1 unit for each 3.5 grams of carbohydrate before supper (IF lows occur within 4 hours of supper, further adjust to 1 unit for each 4 grams of carbohydrate)       2.  If glucose dropping yet not low (below 63 mg/dL) and you are without symptoms of hypoglycemia, start by having a snack of about 15 grams carbohydrate + protein.  Examples:               -Flipps Protein bar              -1 individual container Chobani Less Sugar Greek yogurt              -a tennis ball size piece of whole fruit with peanut butter and/or string cheese     3.  Keep something with you for treating a low blood sugar (below 63-80 mg/dL).  If your blood sugar is low, eat/drink ONE of the following:   -1/2 cup juice  -1/2 cup regular soda  -1 package fruit snacks  -4 glucose tablets  Then, wait 15 minutes and re-check blood sugar.  If it is not above 70 mg/dL, repeat the above.     4..  Virtual follow up with Rose on Friday, June 6th at 9 AM    5.  Add to the list for your hospital bag:    -glucagon   -preferred option for treating hypoglycemia (fruit snacks, etc)   -lancing device     6. Please call or send a [employer] message with any questions or concerns, any low blood sugars, or 3 or more glucoses above target.

## 2025-05-29 NOTE — PROGRESS NOTES
Diabetes and Pregnancy Follow-up  Type of Service: Video Visit/ 21 minutes     Originating Location (Patient Location): Home  Distant Location (Provider Location): [place] - [facility]  Mode of Communication:  Video     Visit Start Time: 8:32 AM  Visit End Time: 8:53 AM     How would patient like to obtain AVS? Lavelle      Subjective/Objective:    Isaiah Jarquin was called for a scheduled BG review. Last date of communication was: 25.    Type 1 diabetes in pregnancy is being managed with medications    Taking diabetes medications: yes:     Diabetes Medication(s)       Diabetic Other       Glucagon (BAQSIMI TWO PACK) 3 MG/DOSE nasal powder Spray 1 spray (3 mg) in nostril as needed for severe hypoglycemia. May repeat dose if no response after 15 minutes.       Insulin       insulin degludec (TRESIBA FLEXTOUCH) 100 UNIT/ML pen Inject 32 Units subcutaneously daily.     insulin lispro (HUMALOG PENFILL) 100 UNIT/ML Cartridge Directed up to 80 units a day.  (ICR = 1:1.5 breakfast and 1:3 lunch and dinner. CF = >120 mg/dL at each meal)          Estimated Date of Delivery: 2025    Blood Glucose/Ketone Lo/28/25: no signs/symptoms of low around 12 AM    : treated low at 1/2 am with cookies    Assessment:  Patient is meeting ALL glucose goals for pregnancy with most recent CGM data:   -time in  mg/dL target range of above 70%   -Time below 63 mg/dL of less than 4%   -Time below 54 mg/dL of less than 1%   -Time above 140 mg/dL of less than 25%  Patient has not yet further adjusted Tresiba U100 dose.  Continues to have hypoglycemia.  Hyperglycemia following hypoglycemia.  Discussed having about 15 grams carbohydrate + protein IF not low and without hypoglycemia symptoms yet worried about ongoing decrease in glucose. Instructed to decrease insulin as below given ongoing hypoglycemia.     Has appointment with Dr. Butler on 6/3/25 and patient plans to ask for initial postpartum insulin plan.      Plan/Response:  Patient Instructions   Insulin doses:   Decrease Tresiba U100 to 30 units daily  Humalog U100 in InPen   -continue 1 unit for each 1.5 grams of carbohydrate before breakfast   -continue 1 unit for each 3 grams of carbohydrate before lunch   -change to 1 unit for each 3.5 grams of carbohydrate before supper (IF lows occur within 4 hours of supper, further adjust to 1 unit for each 4 grams of carbohydrate)       2.  If glucose dropping yet not low (below 63 mg/dL) and you are without symptoms of hypoglycemia, start by having a snack of about 15 grams carbohydrate + protein.  Examples:               -[employer]e Protein bar              -1 individual container Chobani Less Sugar Greek yogurt              -a tennis ball size piece of whole fruit with peanut butter and/or string cheese     3.  Keep something with you for treating a low blood sugar (below 63-80 mg/dL).  If your blood sugar is low, eat/drink ONE of the following:   -1/2 cup juice  -1/2 cup regular soda  -1 package fruit snacks  -4 glucose tablets  Then, wait 15 minutes and re-check blood sugar.  If it is not above 70 mg/dL, repeat the above.     4..  Virtual follow up with Rose on Friday, June 6th at 9 AM    5.  Add to the list for your hospital bag:    -glucagon   -preferred option for treating hypoglycemia (fruit snacks, etc)   -lancing device     6. Please call or send a [employer] message with any questions or concerns, any low blood sugars, or 3 or more glucoses above target.      Rose Peters, MPH, RD, CDCES, LD 5/29/2025    Time Spent: 21 minutes    Any diabetes medication dose changes were made via the CDE Protocol and Collaborative Practice Agreement with the patient's referring provider. A copy of this encounter was shared with the provider.

## 2025-05-29 NOTE — Clinical Note
Just a heads up at patient's visit with me today, 5/29/25, we decreased insulin further. Let me know if questions.  Thank you,  Rose Peters, MPH, RD, CDCES, LD 5/29/2025

## 2025-05-30 ENCOUNTER — HOSPITAL ENCOUNTER (OUTPATIENT)
Dept: ULTRASOUND IMAGING | Facility: CLINIC | Age: 30
Discharge: HOME | End: 2025-05-30
Attending: OBSTETRICS & GYNECOLOGY | Admitting: OBSTETRICS & GYNECOLOGY
Payer: COMMERCIAL

## 2025-05-30 DIAGNOSIS — O24.013 TYPE 1 DIABETES MELLITUS AFFECTING PREGNANCY IN THIRD TRIMESTER, ANTEPARTUM: ICD-10-CM

## 2025-05-30 PROCEDURE — 76819 FETAL BIOPHYS PROFIL W/O NST: CPT

## 2025-06-01 LAB
ABO + RH BLD: NORMAL
BLD GP AB SCN SERPL QL: NEGATIVE
SPECIMEN EXP DATE BLD: NORMAL

## 2025-06-01 NOTE — H&P
"[facility] OB HISTORY AND PHYSICAL    Patient: Isaiah Jarquin   MRN#: [id number]  YOB: 1995     HPI: Isaiah Jarquin is a 30 year old  at 37w3d by LMP c/w 8w3d US who presents today for scheduled  section.    She reports good fetal movement. Denies LOF, vaginal bleeding, or contractions.      She denies fever, chills, headache, vision changes, SOB, chest pain, palpitations, nausea, emesis, RUQ pain, epigastric pain, dysuria, change in vaginal discharge, LE swelling/tenderness.     Pregnancy notable for:   - T1DM, suboptimally controlled  - polyhydramnios, mild  - suspected macrosomia  - rubella NI    No history of asthma or high blood pressure.    OBGYN History:     OB History    Para Term  AB Living   1 0 0 0 0 0   SAB IAB Ectopic Multiple Live Births   0 0 0 0 0      # Outcome Date GA Lbr Kendrick/2nd Weight Sex Type Anes PTL Lv   1 Current               - Last Pap: NILM 3/22/22  - Gyn surgeries: none    Prenatal Lab Results:  Lab Results   Component Value Date    AS Negative 2025    HEPBANG Nonreactive 2024    CHPCRT Negative 2022    GCPCRT Negative 2022    HGB 11.3 (L) 2025     GBS Status: pos  No results found for: \"GBS\"  Patient Active Problem List    Diagnosis Date Noted    Need for diphtheria-tetanus-pertussis (Tdap) vaccine 10/29/2024     Priority: Medium    Type 1 diabetes mellitus with hyperglycemia (H) 2022     Priority: Medium    Type 1 diabetes mellitus without complication (H) 2020     Priority: Medium    Family history of colon cancer 2020     Priority: Medium     Father diagnosed age 47 years.  Patient recommended to start colonoscopy screenings at age 31 years.         Past Medical History  Past Medical History:   Diagnosis Date    Type 1 diabetes mellitus (H)      Past Surgical History  History reviewed. No pertinent surgical history.  Family History  Family History   Problem Relation Age of Onset    No Known " Problems Mother     Colon Cancer Father     No Known Problems Maternal Grandmother     No Known Problems Maternal Grandfather     No Known Problems Paternal Grandmother     Bone Cancer Paternal Grandfather     Lung Cancer Paternal Grandfather     No Known Problems Brother     No Known Problems Brother      Social History  Social History     Tobacco Use    Smoking status: Never     Passive exposure: Never    Smokeless tobacco: Never   Substance Use Topics    Alcohol use: Not Currently     Medications  Medications Prior to Admission   Medication Sig Dispense Refill Last Dose/Taking    aspirin 81 MG EC tablet Take 81 mg by mouth daily.   6/4/2025    insulin degludec (TRESIBA FLEXTOUCH) 100 UNIT/ML pen Inject 30 Units subcutaneously daily. 15 mL 3 6/4/2025    insulin lispro (HUMALOG PENFILL) 100 UNIT/ML Cartridge Directed up to 80 units a day. 24 mL 5 6/4/2025    triamcinolone (KENALOG) 0.1 % external lotion Apply topically 3 times daily. 60 mL 2 Past Week    blood glucose (NO BRAND SPECIFIED) lancets standard Use to test blood sugar 4 times daily or as directed. 100 each 4 Unknown    blood glucose (NO BRAND SPECIFIED) test strip Use to test blood sugar 3 times daily or as directed. 50 strip 11 Unknown    Continuous Glucose Sensor (FREESTYLE BIGG 3 PLUS SENSOR) MISC Use 1 sensor every 15 days. Use to read blood sugars per 's instructions. 6 each 3 Unknown    Glucagon (BAQSIMI TWO PACK) 3 MG/DOSE nasal powder Spray 1 spray (3 mg) in nostril as needed for severe hypoglycemia. May repeat dose if no response after 15 minutes. 1 each 3 Unknown    Injection Device for insulin (INPEN 100-GREY-YESICA-HUMALOG) RAJIV 1 each daily. 1 each 3 Unknown    Injection Device for insulin (INPEN 100-GREY-NOVOLOG-FIASP) RAJIV 1 each daily. (Patient not taking: Reported on 5/15/2025) 1 each 1 Unknown    insulin aspart (NOVOPEN ECHO) 100 UNIT/ML cartridge Use 1 unit for 6 grams of carb and 1 unit to drop 40 over 150 up to 50 units a  "day (Patient not taking: No sig reported) 12 mL 3 Unknown    INSULIN GLARGINE 100 UNIT/ML pen INJECT 23 UNITS SUBCUTANEOUS AT BEDTIME (Patient not taking: Reported on 5/15/2025)   Unknown    insulin lispro (HUMALOG KWIKPEN) 100 UNIT/ML (1 unit dial) KWIKPEN Use  1 unit for 6 g CHO with meals  up to 30 units a day (Patient not taking: No sig reported) 15 mL 2 Unknown    insulin pen needle (32G X 4 MM) 32G X 4 MM miscellaneous Use 5 pen needles daily or as directed. 400 each 3 Unknown    NOVOLOG FLEXPEN 100 UNIT/ML soln Use 1 unit for 7 grams up to total of 30 units per day (Patient not taking: No sig reported) 30 mL 3 Unknown    SEMGLEE, YFGN, 100 UNIT/ML SOPN Inject 23 Units Subcutaneous daily (Patient not taking: No sig reported) 15 mL 3 Unknown     Allergies  No Known Allergies     REVIEW OF SYSTEMS:  A 10 point review of systems was completed and was negative other than as noted in the HPI.    PHYSICAL EXAM  /76 (BP Location: Left arm, Patient Position: Semi-Magaña's, Cuff Size: Adult Regular)   Temp 98.3  F (36.8  C) (Oral)   Resp 16   Ht 1.646 m (5' 4.8\")   Wt 91.2 kg (201 lb)   LMP 2024   BMI 33.65 kg/m    Gen: NAD  CV: regular rate, well perfused  Lungs: non-labored breathing  Abd: Gravid, non-tender, non-distended  Ext: trace peripheral extremity edema      FHT:  Monitoring External  FHT: Baseline 135 bpm; mod variability; + accels; - decelerations  TOCO 0-2 contractions in 10 minutes    Studies: T&S, CBC, RPR    Assessment & Plan: 30 year old  at 37w3d by LMP c/w 8w3d US admitted for scheduled c section. Pregnancy is notable for suboptimally controlled T1DM, polyhydramnios, suspected macrosomia.       # Scheduled Primary  Section  Per patient request given risk of shoulder dystocia. Will plan for a Pfannenstiel skin incision with low transverse hysterotomy.  - Labs: CBC, T&S, RPR  - Placenta: posterior  - Anesthesia: Spinal   - Abx ppx: 2g Ancef   - PPH Meds/Ppx: Standard " meds  - Diet: NPO  - Ppx: SCDs  - Consent: Discussed risks and benefits of procedure, including but not limited to bleeding, infection, injury to surrounding organs (vagina, cervix, uterus, ovaries, fallopian tubes, bowel, bladder, ureters, blood vessels and nerves of the pelvis), injury to infant, and the potential need for another surgery should an intraoperative injury go unrecognized or if patient were to have continued bleeding. Patient had time to ask questions and expressed understanding of procedure and associated risks. Agreed to blood transfusion if necessary. Verbal consent given for hysterectomy in the setting of life threatening hemorrhage. Consent form signed.     # Type 1 Diabetes   Currently suboptimally controlled as shown by polyhydramnios and increasing A1c. Blood glucoses over the last week have been largely in goal although she reports intermittent sudden hypoglycemia per her continuous monitor. Her home insulin regimen includes 29U daily and 1:1.5 coverage for breakfast and 1:3 coverage for lunch and and 1:3.5 coverage for dinner.  She follows with Endocrinology.   - Continue PTA long-acting insulin, will consider cutting dose in half depending on amount of PO intake  - mSSI  - Blood Glucose Checks: Fasting, AC, PP  - likely Endocrinology consult postpartum    # PNC  - Rh pos, Rubella NI, GBS pos  - Other prenatal labs wnl  - Imaging: placenta posterior  - s/p flu, Tdap vaccines  - Pap last NILM 3/22/22  - Contraception: POP  - Feeding: Br     # FWB:   Cat 1 tracing, reactive  - NST preop      # PPH Risk:  - Medium (IOL, augment with pit, HTN, mag, triple III, prolonged labor, precipitous labor/delivery, h/o abruption, >5 deliveries, fibroids, large baby, poly, multiples, general anesthesia, shoulder dystocia, lacs/epis, op delivery, hematoma)- IV, type and screen    Patient discussed with Dr. Justin Payne MD  Obstetrics & Gynecology, PGY-2  06/05/2025 10:02 AM

## 2025-06-01 NOTE — OP NOTE
[facility]  Full Operative Progress Note     Name: Isaiah Jarquin    MRN: [id number]    : 1995    Date of Surgery: 2025    Pre-operative Diagnosis:   -  at 37w3d  - T1DM  - polyhydramnios, mild  - suspected macrosomia  - rubella NI    Post-operative Diagnosis:   - Same, now   - Liveborn male infant    Procedure(s): primary low transverse  section with single layer uterine closure via Pfannenstiel skin incision      Surgeon:  Caitlin Anderson MD     Assistants:  Kulwant Payne MD PGY-2      Anesthesia: Spinal    QBL: 966 mL     Urine Output: 100 mL clear urine     Fluids: 1000 mL crystalloid    Medications: Ancef 2g, Pitocin 30U, TXA 1g    Drains: high    Specimens:   ID Type Source Tests Collected by Time Destination   A :  Tissue Umbilical Cord SEE PROVIDERS ORDERS Caitlin Anderson MD 2025 11:59 AM    B :  Cord blood Umbilical Cord OR DOCUMENTATION ONLY Caitlin Anderson MD 2025 11:59 AM    C :  Placenta Placenta SPECIMEN DISCARDED, OR DOCUMENTATION ONLY Caitlin Anderson MD 2025 12:00 PM        Complications: None apparent.      Indications:   Isaiah Jarquin is a 30 year old year old  at 37w3d who presented for scheduled prime  section in the setting of S>D baby.  The risks, benefits, and alternatives of  section were discussed with the patient, and she agreed to proceed.     Findings:   - A single vigorous, liveborn male weighing 4.04kg with apgars of 9 and 9.  - Normal appearing uterus, fallopian tubes, ovaries.    - No nuchal cord. Clear amniotic fluid.   - No intraabdominal no recto-fascial adhesions.    Procedure Details:   The patient was brought to the OR, where adequate spinal anesthesia was administered.  She was placed in the dorsal supine position with a slight leftward tilt. High catheter was placed in the bladder. She was prepped and draped in the usual sterile fashion. A surgical time out  was performed. A pfannenstiel skin incision was made with the scalpel, and carried down to the underlying fascia with sharp and blunt dissection. The fascia was incised in the midline, and the incision was extended laterally with the Perez scissors. The superior aspect of the fascia was grasped with the Kocher clamps and dissected off of the underlying rectus muscles with blunt and sharp dissection. Attention was then turned to the inferior aspect of the fascia, which was similarly dissected off of the underlying rectus muscles. The rectus muscles were  in the midline, and the peritoneum was entered bluntly, and the opening was extended with digital pressure. The bladder blade was placed. A transverse hysterotomy was made with the scalpel in the lower uterine segment, and the incision was extended with digital pressure. The infant was noted to be in the MIRIAM position, and was delivered atraumatically. The shoulders delivered easily.  No nuchal cord was noted. The cord was doubly clamped and cut, and the infant was handed off to the awaiting nursery staff. A segment of cord was cut and handed off. The placenta was delivered with gentle traction on the umbilical cord and uterine massage. The uterus was cleared of all clots and debris. Uterine tone was noted to be adequate with pitocin given through the running IV and uterine massage.  The hysterotomy was closed with a running locked suture of 0 Vicryl.  The hysterotomy was noted to be hemostatic. The pericolic gutters were cleared of all clots and debris. The hysterotomy was reexamined and noted to be hemostatic. The peritoneal edges, fascia, and rectus muscles were examined and areas of oozing were controlled with electrocautery. The fascia was closed with a running 0 Vicryl suture. The subcutaneous tissue was irrigated and areas of oozing were controlled with electrocautery. The subcutaneous tissue was less than 2 cm in thickness, and was therefore not  closed. The skin was closed with 4-0 monocryl and covered with a sterile dressing.    All sponge, needle, and instrument counts were correct. The patient tolerated the procedure well, and was transferred to recovery in stable condition. Dr. Anderson was present and scrubbed for the entirety of the procedure.     Kulwant Payne MD  Obstetrics & Gynecology, PGY-2  06/05/2025 12:04 PM

## 2025-06-02 ENCOUNTER — HOSPITAL ENCOUNTER (OUTPATIENT)
Dept: ULTRASOUND IMAGING | Facility: CLINIC | Age: 30
Discharge: HOME | End: 2025-06-02
Attending: STUDENT IN AN ORGANIZED HEALTH CARE EDUCATION/TRAINING PROGRAM | Admitting: STUDENT IN AN ORGANIZED HEALTH CARE EDUCATION/TRAINING PROGRAM
Payer: COMMERCIAL

## 2025-06-02 ENCOUNTER — OFFICE VISIT (OUTPATIENT)
Dept: MATERNAL FETAL MEDICINE | Facility: CLINIC | Age: 30
End: 2025-06-02
Attending: STUDENT IN AN ORGANIZED HEALTH CARE EDUCATION/TRAINING PROGRAM
Payer: COMMERCIAL

## 2025-06-02 ENCOUNTER — MYC MEDICAL ADVICE (OUTPATIENT)
Dept: EDUCATION SERVICES | Facility: CLINIC | Age: 30
End: 2025-06-02

## 2025-06-02 ENCOUNTER — RESULTS FOLLOW-UP (OUTPATIENT)
Dept: OBGYN | Facility: CLINIC | Age: 30
End: 2025-06-02

## 2025-06-02 ENCOUNTER — LAB (OUTPATIENT)
Dept: LAB | Facility: CLINIC | Age: 30
End: 2025-06-02
Payer: COMMERCIAL

## 2025-06-02 DIAGNOSIS — Z01.818 PRE-OP EXAM: ICD-10-CM

## 2025-06-02 DIAGNOSIS — O24.013 TYPE 1 DIABETES MELLITUS DURING PREGNANCY IN THIRD TRIMESTER: Primary | ICD-10-CM

## 2025-06-02 DIAGNOSIS — O24.013 TYPE 1 DIABETES MELLITUS IN PREGNANCY, THIRD TRIMESTER: ICD-10-CM

## 2025-06-02 DIAGNOSIS — O24.013 TYPE 1 DIABETES MELLITUS AFFECTING PREGNANCY IN THIRD TRIMESTER, ANTEPARTUM: ICD-10-CM

## 2025-06-02 LAB
ERYTHROCYTE [DISTWIDTH] IN BLOOD BY AUTOMATED COUNT: 15.5 % (ref 10–15)
HCT VFR BLD AUTO: 36.1 % (ref 35–47)
HGB BLD-MCNC: 11.3 G/DL (ref 11.7–15.7)
MCH RBC QN AUTO: 25.7 PG (ref 26.5–33)
MCHC RBC AUTO-ENTMCNC: 31.3 G/DL (ref 31.5–36.5)
MCV RBC AUTO: 82 FL (ref 78–100)
PLATELET # BLD AUTO: 211 10E3/UL (ref 150–450)
RBC # BLD AUTO: 4.39 10E6/UL (ref 3.8–5.2)
WBC # BLD AUTO: 10 10E3/UL (ref 4–11)

## 2025-06-02 PROCEDURE — 86850 RBC ANTIBODY SCREEN: CPT

## 2025-06-02 PROCEDURE — 86780 TREPONEMA PALLIDUM: CPT

## 2025-06-02 PROCEDURE — 86900 BLOOD TYPING SEROLOGIC ABO: CPT

## 2025-06-02 PROCEDURE — 76819 FETAL BIOPHYS PROFIL W/O NST: CPT

## 2025-06-02 PROCEDURE — 85027 COMPLETE CBC AUTOMATED: CPT

## 2025-06-02 PROCEDURE — 36415 COLL VENOUS BLD VENIPUNCTURE: CPT

## 2025-06-02 PROCEDURE — 86901 BLOOD TYPING SEROLOGIC RH(D): CPT

## 2025-06-02 NOTE — NURSING NOTE
Patient presents to [facility] for BPP at 37w0d due to Type 1 DM and mild polyhydramnios. Positive fetal movement. Denies LOF, vaginal bleeding or cramping/contractions. SBAR given to JERED MD, see their note in Epic.

## 2025-06-02 NOTE — PROGRESS NOTES
"Please see \"Imaging\" tab under \"Chart Review\" for details of today's visit.    Soraida Lee    "

## 2025-06-03 ENCOUNTER — VIRTUAL VISIT (OUTPATIENT)
Dept: ENDOCRINOLOGY | Facility: CLINIC | Age: 30
End: 2025-06-03
Payer: COMMERCIAL

## 2025-06-03 VITALS — BODY MASS INDEX: 35.17 KG/M2 | WEIGHT: 206 LBS | HEIGHT: 64 IN

## 2025-06-03 DIAGNOSIS — O24.013 PREGNANCY COMPLICATED BY PRE-EXISTING TYPE 1 DIABETES IN THIRD TRIMESTER: Primary | ICD-10-CM

## 2025-06-03 LAB — T PALLIDUM AB SER QL: NONREACTIVE

## 2025-06-03 PROCEDURE — 95251 CONT GLUC MNTR ANALYSIS I&R: CPT | Performed by: INTERNAL MEDICINE

## 2025-06-03 PROCEDURE — 98006 SYNCH AUDIO-VIDEO EST MOD 30: CPT | Mod: 25 | Performed by: INTERNAL MEDICINE

## 2025-06-03 PROCEDURE — 1126F AMNT PAIN NOTED NONE PRSNT: CPT | Mod: 95 | Performed by: INTERNAL MEDICINE

## 2025-06-03 ASSESSMENT — PAIN SCALES - GENERAL: PAINLEVEL_OUTOF10: NO PAIN (0)

## 2025-06-03 NOTE — PATIENT INSTRUCTIONS
After delivery your insulin dose should be Tresiba 19 units daily.  At mealtime use a ratio of 1 unit for 7 g of carb.  A good correction factor to start on is something like 1 to drop you 35.    Reach out if you have any questions about glucose management.

## 2025-06-03 NOTE — NURSING NOTE
Current patient location: [address] [address]    Is the patient currently in the state of [place]? YES    Visit mode: VIDEO    If the visit is dropped, the patient can be reconnected by:VIDEO VISIT: Send to e-mail at: [email or web address]    Will anyone else be joining the visit? NO  (If patient encounters technical issues they should call [phone]    Are changes needed to the allergy or medication list? No    Are refills needed on medications prescribed by this physician? NO    Rooming Documentation:  Questionnaire(s) completed    Reason for visit: RECHECK    Addie AUGUSTINEF

## 2025-06-03 NOTE — LETTER
6/3/2025       RE: Isaiah Jarquin  [address] [address]     Dear Colleague,    Thank you for referring your patient, Isaiah Jarquin, to the [facility] [place] at [facility]. Please see a copy of my visit note below.    Virtual Visit Details    Type of service:  Video Visit     Originating Location (pt. Location): Home    Distant Location (provider location):  Off-site  Platform used for Video Visit: AmPLAXD    Outcome for 25 11:35 AM: Data uploaded on [employer]  Clifton Torrez MA  Outcome for 25 11:35 AM: Met[employer] message sent requesting inpen data  Clifton Torrez MA  Outcome for 25 12:19 PM: Replied to [employer] message  Evelin Valdovinos MA  Outcome for 25 8:29 AM: Patient will send inpen report via Dacuda before appointment.  Clifton Torrez MA  Outcome for 25 8:22 AM: Replied to [employer] message  Clifton Torrez MA  Outcome for 25 8:24 AM: Data obtained via [employer] website. Patient sent inpen report via Dacuda.  Clifton Torrez MA    Reduce tresiba more? Took 22 units lantus before pregnancy and 1 per 7 gm    This 30-year-old woman seen for f/u of her type 1 diabetes.  She was diagnosed in . She is now 37 wks pregnant. She is  and  has an  LIZZ: 2025, by Last Menstrual Period.  She is scheduled for  on 2025    Isaiah has been working very hard at controlling her blood sugars.  She has been connecting with different members of the team every week or 2 throughout her pregnancy.  Charles is using a perla sensor and an InPen device.  She uses the InPen device to record insulin but does not use it to calculate her doses.  In recent weeks, she has been having problems with hypoglycemia and her insulin doses have been reduced.  Last night she reduced her Tresiba dose to 29 because of hypoglycemia.  She is bolusing 1 unit for 2 g of carb at breakfast, 1 unit for 3 g of carb at  lunch and 1 for 3.5 at dinner.  She is recognizing her hypoglycemia without any particular problem.  She is eager for the baby to be born.  She has many questions about insulin dosing after delivery.    She has not seen her eye doctor yet but will make an appointment.  She denies chest pain or shortness of breath.  She just feels very big and uncomfortable.                            Current Outpatient Medications   Medication Sig Dispense Refill     aspirin 81 MG EC tablet Take 81 mg by mouth daily.       blood glucose (NO BRAND SPECIFIED) lancets standard Use to test blood sugar 4 times daily or as directed. 100 each 4     blood glucose (NO BRAND SPECIFIED) test strip Use to test blood sugar 3 times daily or as directed. 50 strip 11     Continuous Glucose Sensor (FREESTYLE BIGG 3 PLUS SENSOR) MISC Use 1 sensor every 15 days. Use to read blood sugars per 's instructions. 6 each 3     Glucagon (BAQSIMI TWO PACK) 3 MG/DOSE nasal powder Spray 1 spray (3 mg) in nostril as needed for severe hypoglycemia. May repeat dose if no response after 15 minutes. 1 each 3     Injection Device for insulin (INPEN 100-GREY-YESICA-HUMALOG) RAJIV 1 each daily. 1 each 3     Injection Device for insulin (INPEN 100-GREY-NOVOLOG-FIASP) RAJIV 1 each daily. (Patient not taking: Reported on 5/30/2025) 1 each 1     insulin aspart (NOVOPEN ECHO) 100 UNIT/ML cartridge Use 1 unit for 6 grams of carb and 1 unit to drop 40 over 150 up to 50 units a day (Patient not taking: Reported on 5/30/2025) 12 mL 3     insulin degludec (TRESIBA FLEXTOUCH) 100 UNIT/ML pen Inject 30 Units subcutaneously daily. 15 mL 3     INSULIN GLARGINE 100 UNIT/ML pen INJECT 23 UNITS SUBCUTANEOUS AT BEDTIME (Patient not taking: Reported on 5/30/2025)       insulin lispro (HUMALOG KWIKPEN) 100 UNIT/ML (1 unit dial) KWIKPEN Use  1 unit for 6 g CHO with meals  up to 30 units a day (Patient not taking: Reported on 5/30/2025) 15 mL 2     insulin lispro (HUMALOG PENFILL)  100 UNIT/ML Cartridge Directed up to 80 units a day. 24 mL 5     insulin pen needle (32G X 4 MM) 32G X 4 MM miscellaneous Use 5 pen needles daily or as directed. 400 each 3     NOVOLOG FLEXPEN 100 UNIT/ML soln Use 1 unit for 7 grams up to total of 30 units per day (Patient not taking: Reported on 5/30/2025) 30 mL 3     SEMGLEE, YFGN, 100 UNIT/ML SOPN Inject 23 Units Subcutaneous daily (Patient not taking: Reported on 5/30/2025) 15 mL 3     triamcinolone (KENALOG) 0.1 % external lotion Apply topically 3 times daily. 60 mL 2     No current facility-administered medications for this visit.         On exam she is in no acute distress.    Recent Labs   Lab Test 05/16/25  1027 02/25/25  1506 12/11/24  1036 03/18/24  1432 10/11/23  1055 03/21/23  1214 03/21/23  1123 03/22/22  1026 12/10/20  1000 12/10/20  0953   A1C 7.5* 6.7* 6.9* 7.6* 8.1*  --    < >  --    < > 8.0*   TSH  --   --  0.83  --  0.78  --   --   --   --   --    LDL  --   --   --   --   --   --   --   --   --  79   HDL  --   --   --   --   --   --   --   --   --  58   TRIG  --   --   --   --   --   --   --   --   --  90   CR  --   --  0.67 0.83 0.78  --    < >  --    < >  --    MICROL  --   --   --   --   --  <12.0  --  <5   < >  --     < > = values in this interval not displayed.      Assessment and plan:    1.  Diabetes control.  Charles has had much better glucose control in recent weeks.  She has had to reduce her insulin doses because of hypoglycemia.  She has not had any serious episodes of hypoglycemia.  I told her I agreed with her action to reduce the Tresiba by 1 unit yesterday.  I encouraged her to continue to take that same 29 units until she delivers.  We talked about a plan for postdelivery.  She was taking 19 units of Tresiba before she was pregnant so she will start with that dose.  She was bolusing 1 unit per 7 g of carb before she was pregnant.  She will start with that.  She is planning to breast-feed.  I encouraged her to reach out if she  has any specific problems adjusting her insulin doses.  I think it is optimal if she is allowed to manage her insulin doses herself while she is in the hospital.  We have developed a plan together and she is ready to implement.    2.  Diabetes complications.  She needs to see the eye doctor.  She does not have a history of retinopathy but I am concerned that something could have changed during pregnancy.  Will check her renal function in the postpartum..  Her feet are fine.    I will schedule her to see Kimberly Barrett in about 3 months and me in about 6 months.    I spent 20 minutes on the video visit with the patient today (start time 2: 3 0 and end time 2: 5 0).  On the same day of visit I spent an additional 10 minutes reviewing her chart, reviewing her labs, and doing documentation.  This time was exclusive of the time I spent reviewing and interpreting her continuous glucose monitor that shows she is frequently low.    Lola Butler MD                  Again, thank you for allowing me to participate in the care of your patient.      Sincerely,    Lola Butler MD

## 2025-06-04 ENCOUNTER — ANESTHESIA EVENT (OUTPATIENT)
Dept: OBGYN | Facility: CLINIC | Age: 30
End: 2025-06-04
Payer: COMMERCIAL

## 2025-06-04 NOTE — ANESTHESIA PREPROCEDURE EVALUATION
Anesthesia Pre-Procedure Evaluation    Patient: Isaiah Jarquin   MRN: [id number] : 1995          Procedure : Procedure(s):   SECTION         Past Medical History:   Diagnosis Date    Type 1 diabetes mellitus (H)       No past surgical history on file.   No Known Allergies   Social History     Tobacco Use    Smoking status: Never     Passive exposure: Never    Smokeless tobacco: Never   Substance Use Topics    Alcohol use: Not Currently      Wt Readings from Last 1 Encounters:   25 93.4 kg (206 lb)        Anesthesia Evaluation   Pt has had prior anesthetic.     No history of anesthetic complications       ROS/MED HX  ENT/Pulmonary:       Neurologic:  - neg neurologic ROS     Cardiovascular:  - neg cardiovascular ROS     METS/Exercise Tolerance: >4 METS    Hematologic:  - neg hematologic  ROS     Musculoskeletal:  - neg musculoskeletal ROS     GI/Hepatic:  - neg GI/hepatic ROS     Renal/Genitourinary:  - neg Renal ROS     Endo:     (+) type I DM,  Last HgA1c: 7.5, date: 25,                  Psychiatric/Substance Use:  - neg psychiatric ROS     Infectious Disease:  - neg infectious disease ROS     Malignancy:  - neg malignancy ROS     Other:      (+) Possibly pregnant, , ,           Physical Exam  Airway  Mallampati: II  TM distance: >3 FB  Neck ROM: full  Mouth opening: >= 4 cm    Cardiovascular   Rhythm: regular  Rate: normal rate     Dental   (+) Completely normal teeth      Pulmonary Breath sounds clear to auscultation        Neurological   She appears awake and oriented x3.    Other Findings       OUTSIDE LABS:  CBC:   Lab Results   Component Value Date    WBC 10.0 2025    WBC 10.3 2025    HGB 11.3 (L) 2025    HGB 10.8 (L) 2025    HCT 36.1 2025    HCT 36.6 2025     2025     2025     BMP:   Lab Results   Component Value Date     2024     2024    POTASSIUM 4.2 2024    POTASSIUM 3.8 2024     "CHLORIDE 102 12/11/2024    CHLORIDE 104 03/18/2024    CO2 24 12/11/2024    CO2 25 03/18/2024    BUN 10.9 12/11/2024    BUN 11.9 03/18/2024    CR 0.67 12/11/2024    CR 0.83 03/18/2024     (H) 12/11/2024    GLC 55 (L) 03/18/2024     COAGS: No results found for: \"PTT\", \"INR\", \"FIBR\"  POC: No results found for: \"BGM\", \"HCG\", \"HCGS\"  HEPATIC:   Lab Results   Component Value Date    ALBUMIN 4.3 12/11/2024    PROTTOTAL 6.9 12/11/2024    ALT 16 12/11/2024    AST 20 12/11/2024    ALKPHOS 69 12/11/2024    BILITOTAL 0.3 12/11/2024     OTHER:   Lab Results   Component Value Date    A1C 7.5 (H) 05/16/2025    GAIL 9.6 12/11/2024    TSH 0.83 12/11/2024    T4 1.09 07/03/2012    T4 6.7 07/03/2012    CRP <5.0 07/03/2012    SED 6 07/03/2012       Anesthesia Plan    ASA Status:  2      NPO Status: ELEVATED Aspiration Risk/Unknown   Anesthesia Type: Spinal.  Airway: natural airway.  Maintenance: N/A.   Techniques and Equipment:       - Monitoring Plan: standard ASA monitoring     Consents    Anesthesia Plan(s) and associated risks, benefits, and realistic alternatives discussed. Questions answered and patient/representative(s) expressed understanding.     - Discussed:     - Discussed with:  Patient, family               Postoperative Care    Pain management: non-narcotic analgesics, multimodal analgesia.     Comments:                   Halie Galvan MD    I have reviewed the pertinent notes and labs in the chart from the past 30 days and (re)examined the patient.  Any updates or changes from those notes are reflected in this note.    Clinically Significant Risk Factors Present on Admission                            # DMII: A1C = 7.5 % (Ref range: 0.0 - 5.6 %) within past 6 months                     "

## 2025-06-05 ENCOUNTER — ANESTHESIA (OUTPATIENT)
Dept: OBGYN | Facility: CLINIC | Age: 30
End: 2025-06-05
Payer: COMMERCIAL

## 2025-06-05 ENCOUNTER — HOSPITAL ENCOUNTER (INPATIENT)
Facility: CLINIC | Age: 30
LOS: 3 days | Discharge: HOME | End: 2025-06-08
Attending: OBSTETRICS & GYNECOLOGY | Admitting: OBSTETRICS & GYNECOLOGY
Payer: COMMERCIAL

## 2025-06-05 DIAGNOSIS — E10.9 TYPE 1 DIABETES MELLITUS WITHOUT COMPLICATION (H): Primary | ICD-10-CM

## 2025-06-05 DIAGNOSIS — Z98.891 S/P CESAREAN SECTION: ICD-10-CM

## 2025-06-05 LAB
GLUCOSE BLDC GLUCOMTR-MCNC: 117 MG/DL (ref 70–99)
GLUCOSE BLDC GLUCOMTR-MCNC: 126 MG/DL (ref 70–99)
GLUCOSE BLDC GLUCOMTR-MCNC: 128 MG/DL (ref 70–99)
GLUCOSE BLDC GLUCOMTR-MCNC: 135 MG/DL (ref 70–99)
GLUCOSE BLDC GLUCOMTR-MCNC: 197 MG/DL (ref 70–99)
GLUCOSE BLDC GLUCOMTR-MCNC: 290 MG/DL (ref 70–99)
GLUCOSE BLDC GLUCOMTR-MCNC: 91 MG/DL (ref 70–99)

## 2025-06-05 PROCEDURE — 258N000003 HC RX IP 258 OP 636

## 2025-06-05 PROCEDURE — 250N000009 HC RX 250

## 2025-06-05 PROCEDURE — 250N000013 HC RX MED GY IP 250 OP 250 PS 637: Performed by: OBSTETRICS & GYNECOLOGY

## 2025-06-05 PROCEDURE — 370N000017 HC ANESTHESIA TECHNICAL FEE, PER MIN: Performed by: OBSTETRICS & GYNECOLOGY

## 2025-06-05 PROCEDURE — 250N000011 HC RX IP 250 OP 636: Mod: JZ

## 2025-06-05 PROCEDURE — 258N000003 HC RX IP 258 OP 636: Performed by: OBSTETRICS & GYNECOLOGY

## 2025-06-05 PROCEDURE — 99222 1ST HOSP IP/OBS MODERATE 55: CPT

## 2025-06-05 PROCEDURE — 250N000011 HC RX IP 250 OP 636: Mod: JW

## 2025-06-05 PROCEDURE — 271N000001 HC OR GENERAL SUPPLY NON-STERILE: Performed by: OBSTETRICS & GYNECOLOGY

## 2025-06-05 PROCEDURE — 250N000009 HC RX 250: Performed by: OBSTETRICS & GYNECOLOGY

## 2025-06-05 PROCEDURE — 250N000013 HC RX MED GY IP 250 OP 250 PS 637

## 2025-06-05 PROCEDURE — 272N000001 HC OR GENERAL SUPPLY STERILE: Performed by: OBSTETRICS & GYNECOLOGY

## 2025-06-05 PROCEDURE — 59510 CESAREAN DELIVERY: CPT | Mod: GC | Performed by: OBSTETRICS & GYNECOLOGY

## 2025-06-05 PROCEDURE — 120N000002 HC R&B MED SURG/OB UMMC

## 2025-06-05 PROCEDURE — 258N000001 HC RX 258

## 2025-06-05 PROCEDURE — 250N000011 HC RX IP 250 OP 636: Performed by: OBSTETRICS & GYNECOLOGY

## 2025-06-05 PROCEDURE — 250N000011 HC RX IP 250 OP 636: Performed by: STUDENT IN AN ORGANIZED HEALTH CARE EDUCATION/TRAINING PROGRAM

## 2025-06-05 PROCEDURE — 999N000141 HC STATISTIC PRE-PROCEDURE NURSING ASSESSMENT: Performed by: OBSTETRICS & GYNECOLOGY

## 2025-06-05 PROCEDURE — 360N000076 HC SURGERY LEVEL 3, PER MIN: Performed by: OBSTETRICS & GYNECOLOGY

## 2025-06-05 PROCEDURE — 250N000011 HC RX IP 250 OP 636

## 2025-06-05 PROCEDURE — 710N000010 HC RECOVERY PHASE 1, LEVEL 2, PER MIN: Performed by: OBSTETRICS & GYNECOLOGY

## 2025-06-05 RX ORDER — ACETAMINOPHEN 325 MG/1
975 TABLET ORAL EVERY 6 HOURS
Status: DISCONTINUED | OUTPATIENT
Start: 2025-06-05 | End: 2025-06-08 | Stop reason: HOSPADM

## 2025-06-05 RX ORDER — CEFAZOLIN SODIUM/WATER 2 G/20 ML
2 SYRINGE (ML) INTRAVENOUS
Status: COMPLETED | OUTPATIENT
Start: 2025-06-05 | End: 2025-06-05

## 2025-06-05 RX ORDER — LOPERAMIDE HYDROCHLORIDE 2 MG/1
4 CAPSULE ORAL
Status: DISCONTINUED | OUTPATIENT
Start: 2025-06-05 | End: 2025-06-08 | Stop reason: HOSPADM

## 2025-06-05 RX ORDER — CITRIC ACID/SODIUM CITRATE 334-500MG
30 SOLUTION, ORAL ORAL
Status: COMPLETED | OUTPATIENT
Start: 2025-06-05 | End: 2025-06-05

## 2025-06-05 RX ORDER — METHYLERGONOVINE MALEATE 0.2 MG/ML
200 INJECTION INTRAVENOUS
Status: DISCONTINUED | OUTPATIENT
Start: 2025-06-05 | End: 2025-06-05

## 2025-06-05 RX ORDER — BUPIVACAINE HYDROCHLORIDE 2.5 MG/ML
INJECTION, SOLUTION EPIDURAL; INFILTRATION; INTRACAUDAL; PERINEURAL
Status: COMPLETED | OUTPATIENT
Start: 2025-06-05 | End: 2025-06-05

## 2025-06-05 RX ORDER — ONDANSETRON 4 MG/1
4 TABLET, ORALLY DISINTEGRATING ORAL EVERY 6 HOURS PRN
Status: DISCONTINUED | OUTPATIENT
Start: 2025-06-05 | End: 2025-06-08 | Stop reason: HOSPADM

## 2025-06-05 RX ORDER — MISOPROSTOL 200 UG/1
400 TABLET ORAL
Status: DISCONTINUED | OUTPATIENT
Start: 2025-06-05 | End: 2025-06-08 | Stop reason: HOSPADM

## 2025-06-05 RX ORDER — TRANEXAMIC ACID 10 MG/ML
1 INJECTION, SOLUTION INTRAVENOUS EVERY 30 MIN PRN
Status: DISCONTINUED | OUTPATIENT
Start: 2025-06-05 | End: 2025-06-05

## 2025-06-05 RX ORDER — NALOXONE HYDROCHLORIDE 0.4 MG/ML
0.2 INJECTION, SOLUTION INTRAMUSCULAR; INTRAVENOUS; SUBCUTANEOUS
Status: DISCONTINUED | OUTPATIENT
Start: 2025-06-05 | End: 2025-06-08 | Stop reason: HOSPADM

## 2025-06-05 RX ORDER — OXYTOCIN/0.9 % SODIUM CHLORIDE 30/500 ML
340 PLASTIC BAG, INJECTION (ML) INTRAVENOUS CONTINUOUS PRN
Status: DISCONTINUED | OUTPATIENT
Start: 2025-06-05 | End: 2025-06-05

## 2025-06-05 RX ORDER — PROCHLORPERAZINE MALEATE 10 MG
10 TABLET ORAL EVERY 6 HOURS PRN
Status: DISCONTINUED | OUTPATIENT
Start: 2025-06-05 | End: 2025-06-05

## 2025-06-05 RX ORDER — MORPHINE SULFATE 1 MG/ML
INJECTION, SOLUTION EPIDURAL; INTRATHECAL; INTRAVENOUS
Status: COMPLETED | OUTPATIENT
Start: 2025-06-05 | End: 2025-06-05

## 2025-06-05 RX ORDER — CARBOPROST TROMETHAMINE 250 UG/ML
250 INJECTION, SOLUTION INTRAMUSCULAR
Status: DISCONTINUED | OUTPATIENT
Start: 2025-06-05 | End: 2025-06-05

## 2025-06-05 RX ORDER — NALOXONE HYDROCHLORIDE 0.4 MG/ML
0.2 INJECTION, SOLUTION INTRAMUSCULAR; INTRAVENOUS; SUBCUTANEOUS
Status: DISCONTINUED | OUTPATIENT
Start: 2025-06-05 | End: 2025-06-05

## 2025-06-05 RX ORDER — METOCLOPRAMIDE 10 MG/1
10 TABLET ORAL EVERY 6 HOURS PRN
Status: DISCONTINUED | OUTPATIENT
Start: 2025-06-05 | End: 2025-06-05

## 2025-06-05 RX ORDER — OXYTOCIN 10 [USP'U]/ML
10 INJECTION, SOLUTION INTRAMUSCULAR; INTRAVENOUS
Status: DISCONTINUED | OUTPATIENT
Start: 2025-06-05 | End: 2025-06-05

## 2025-06-05 RX ORDER — ONDANSETRON 4 MG/1
4 TABLET, ORALLY DISINTEGRATING ORAL EVERY 6 HOURS PRN
Status: DISCONTINUED | OUTPATIENT
Start: 2025-06-05 | End: 2025-06-05

## 2025-06-05 RX ORDER — NALOXONE HYDROCHLORIDE 0.4 MG/ML
0.4 INJECTION, SOLUTION INTRAMUSCULAR; INTRAVENOUS; SUBCUTANEOUS
Status: DISCONTINUED | OUTPATIENT
Start: 2025-06-05 | End: 2025-06-05

## 2025-06-05 RX ORDER — LOPERAMIDE HYDROCHLORIDE 2 MG/1
2 CAPSULE ORAL
Status: DISCONTINUED | OUTPATIENT
Start: 2025-06-05 | End: 2025-06-05

## 2025-06-05 RX ORDER — DEXTROSE, SODIUM CHLORIDE, SODIUM LACTATE, POTASSIUM CHLORIDE, AND CALCIUM CHLORIDE 5; .6; .31; .03; .02 G/100ML; G/100ML; G/100ML; G/100ML; G/100ML
INJECTION, SOLUTION INTRAVENOUS CONTINUOUS
Status: DISCONTINUED | OUTPATIENT
Start: 2025-06-05 | End: 2025-06-08 | Stop reason: HOSPADM

## 2025-06-05 RX ORDER — DEXAMETHASONE SODIUM PHOSPHATE 4 MG/ML
4 INJECTION, SOLUTION INTRA-ARTICULAR; INTRALESIONAL; INTRAMUSCULAR; INTRAVENOUS; SOFT TISSUE
Status: COMPLETED | OUTPATIENT
Start: 2025-06-05 | End: 2025-06-05

## 2025-06-05 RX ORDER — DEXTROSE MONOHYDRATE 25 G/50ML
25 INJECTION, SOLUTION INTRAVENOUS
Status: DISCONTINUED | OUTPATIENT
Start: 2025-06-05 | End: 2025-06-08 | Stop reason: HOSPADM

## 2025-06-05 RX ORDER — OXYTOCIN/0.9 % SODIUM CHLORIDE 30/500 ML
340 PLASTIC BAG, INJECTION (ML) INTRAVENOUS CONTINUOUS PRN
Status: DISCONTINUED | OUTPATIENT
Start: 2025-06-05 | End: 2025-06-08 | Stop reason: HOSPADM

## 2025-06-05 RX ORDER — NALOXONE HYDROCHLORIDE 0.4 MG/ML
0.4 INJECTION, SOLUTION INTRAMUSCULAR; INTRAVENOUS; SUBCUTANEOUS
Status: DISCONTINUED | OUTPATIENT
Start: 2025-06-05 | End: 2025-06-08 | Stop reason: HOSPADM

## 2025-06-05 RX ORDER — ONDANSETRON 2 MG/ML
INJECTION INTRAMUSCULAR; INTRAVENOUS PRN
Status: DISCONTINUED | OUTPATIENT
Start: 2025-06-05 | End: 2025-06-05

## 2025-06-05 RX ORDER — FENTANYL CITRATE-0.9 % NACL/PF 10 MCG/ML
100 PLASTIC BAG, INJECTION (ML) INTRAVENOUS EVERY 5 MIN PRN
Status: DISCONTINUED | OUTPATIENT
Start: 2025-06-05 | End: 2025-06-05

## 2025-06-05 RX ORDER — NICOTINE POLACRILEX 4 MG
15-30 LOZENGE BUCCAL
Status: DISCONTINUED | OUTPATIENT
Start: 2025-06-05 | End: 2025-06-08 | Stop reason: HOSPADM

## 2025-06-05 RX ORDER — ONDANSETRON 4 MG/1
4 TABLET, ORALLY DISINTEGRATING ORAL EVERY 30 MIN PRN
Status: DISCONTINUED | OUTPATIENT
Start: 2025-06-05 | End: 2025-06-05

## 2025-06-05 RX ORDER — METHYLERGONOVINE MALEATE 0.2 MG/ML
200 INJECTION INTRAVENOUS
Status: DISCONTINUED | OUTPATIENT
Start: 2025-06-05 | End: 2025-06-08 | Stop reason: HOSPADM

## 2025-06-05 RX ORDER — MISOPROSTOL 200 UG/1
800 TABLET ORAL
Status: DISCONTINUED | OUTPATIENT
Start: 2025-06-05 | End: 2025-06-08 | Stop reason: HOSPADM

## 2025-06-05 RX ORDER — DEXTROSE MONOHYDRATE 25 G/50ML
INJECTION, SOLUTION INTRAVENOUS
Status: COMPLETED
Start: 2025-06-05 | End: 2025-06-05

## 2025-06-05 RX ORDER — OXYCODONE HYDROCHLORIDE 5 MG/1
5 TABLET ORAL EVERY 4 HOURS PRN
Status: DISCONTINUED | OUTPATIENT
Start: 2025-06-05 | End: 2025-06-08 | Stop reason: HOSPADM

## 2025-06-05 RX ORDER — PROCHLORPERAZINE MALEATE 10 MG
10 TABLET ORAL EVERY 6 HOURS PRN
Status: DISCONTINUED | OUTPATIENT
Start: 2025-06-05 | End: 2025-06-08 | Stop reason: HOSPADM

## 2025-06-05 RX ORDER — BUPIVACAINE HYDROCHLORIDE 7.5 MG/ML
INJECTION, SOLUTION INTRASPINAL
Status: COMPLETED | OUTPATIENT
Start: 2025-06-05 | End: 2025-06-05

## 2025-06-05 RX ORDER — LIDOCAINE 40 MG/G
CREAM TOPICAL
Status: DISCONTINUED | OUTPATIENT
Start: 2025-06-05 | End: 2025-06-05

## 2025-06-05 RX ORDER — METOCLOPRAMIDE HYDROCHLORIDE 5 MG/ML
10 INJECTION INTRAMUSCULAR; INTRAVENOUS EVERY 6 HOURS PRN
Status: DISCONTINUED | OUTPATIENT
Start: 2025-06-05 | End: 2025-06-05

## 2025-06-05 RX ORDER — ONDANSETRON 2 MG/ML
4 INJECTION INTRAMUSCULAR; INTRAVENOUS EVERY 6 HOURS PRN
Status: DISCONTINUED | OUTPATIENT
Start: 2025-06-05 | End: 2025-06-08 | Stop reason: HOSPADM

## 2025-06-05 RX ORDER — LOPERAMIDE HYDROCHLORIDE 2 MG/1
2 CAPSULE ORAL
Status: DISCONTINUED | OUTPATIENT
Start: 2025-06-05 | End: 2025-06-08 | Stop reason: HOSPADM

## 2025-06-05 RX ORDER — NALOXONE HYDROCHLORIDE 0.4 MG/ML
0.1 INJECTION, SOLUTION INTRAMUSCULAR; INTRAVENOUS; SUBCUTANEOUS
Status: DISCONTINUED | OUTPATIENT
Start: 2025-06-05 | End: 2025-06-05

## 2025-06-05 RX ORDER — AMOXICILLIN 250 MG
2 CAPSULE ORAL 2 TIMES DAILY
Status: DISCONTINUED | OUTPATIENT
Start: 2025-06-05 | End: 2025-06-08 | Stop reason: HOSPADM

## 2025-06-05 RX ORDER — METOCLOPRAMIDE HYDROCHLORIDE 5 MG/ML
10 INJECTION INTRAMUSCULAR; INTRAVENOUS EVERY 6 HOURS PRN
Status: DISCONTINUED | OUTPATIENT
Start: 2025-06-05 | End: 2025-06-08 | Stop reason: HOSPADM

## 2025-06-05 RX ORDER — KETOROLAC TROMETHAMINE 15 MG/ML
15 INJECTION, SOLUTION INTRAMUSCULAR; INTRAVENOUS EVERY 6 HOURS
Status: COMPLETED | OUTPATIENT
Start: 2025-06-05 | End: 2025-06-06

## 2025-06-05 RX ORDER — ONDANSETRON 2 MG/ML
4 INJECTION INTRAMUSCULAR; INTRAVENOUS EVERY 6 HOURS PRN
Status: DISCONTINUED | OUTPATIENT
Start: 2025-06-05 | End: 2025-06-05

## 2025-06-05 RX ORDER — DEXTROSE MONOHYDRATE 25 G/50ML
INJECTION, SOLUTION INTRAVENOUS PRN
Status: DISCONTINUED | OUTPATIENT
Start: 2025-06-05 | End: 2025-06-05

## 2025-06-05 RX ORDER — SIMETHICONE 80 MG
80 TABLET,CHEWABLE ORAL 4 TIMES DAILY PRN
Status: DISCONTINUED | OUTPATIENT
Start: 2025-06-05 | End: 2025-06-08 | Stop reason: HOSPADM

## 2025-06-05 RX ORDER — DEXTROSE MONOHYDRATE 25 G/50ML
25-50 INJECTION, SOLUTION INTRAVENOUS
Status: DISCONTINUED | OUTPATIENT
Start: 2025-06-05 | End: 2025-06-08 | Stop reason: HOSPADM

## 2025-06-05 RX ORDER — SODIUM PHOSPHATE,MONO-DIBASIC 19G-7G/118
1 ENEMA (ML) RECTAL DAILY PRN
Status: DISCONTINUED | OUTPATIENT
Start: 2025-06-07 | End: 2025-06-08 | Stop reason: HOSPADM

## 2025-06-05 RX ORDER — OXYTOCIN 10 [USP'U]/ML
10 INJECTION, SOLUTION INTRAMUSCULAR; INTRAVENOUS
Status: DISCONTINUED | OUTPATIENT
Start: 2025-06-05 | End: 2025-06-08 | Stop reason: HOSPADM

## 2025-06-05 RX ORDER — BISACODYL 10 MG
10 SUPPOSITORY, RECTAL RECTAL DAILY PRN
Status: DISCONTINUED | OUTPATIENT
Start: 2025-06-07 | End: 2025-06-08 | Stop reason: HOSPADM

## 2025-06-05 RX ORDER — TRANEXAMIC ACID 10 MG/ML
1 INJECTION, SOLUTION INTRAVENOUS EVERY 30 MIN PRN
Status: DISCONTINUED | OUTPATIENT
Start: 2025-06-05 | End: 2025-06-08 | Stop reason: HOSPADM

## 2025-06-05 RX ORDER — MISOPROSTOL 200 UG/1
800 TABLET ORAL
Status: DISCONTINUED | OUTPATIENT
Start: 2025-06-05 | End: 2025-06-05

## 2025-06-05 RX ORDER — AMOXICILLIN 250 MG
1 CAPSULE ORAL 2 TIMES DAILY
Status: DISCONTINUED | OUTPATIENT
Start: 2025-06-05 | End: 2025-06-08 | Stop reason: HOSPADM

## 2025-06-05 RX ORDER — OXYTOCIN/0.9 % SODIUM CHLORIDE 30/500 ML
100-340 PLASTIC BAG, INJECTION (ML) INTRAVENOUS CONTINUOUS PRN
Status: DISCONTINUED | OUTPATIENT
Start: 2025-06-05 | End: 2025-06-05

## 2025-06-05 RX ORDER — SODIUM CHLORIDE, SODIUM LACTATE, POTASSIUM CHLORIDE, CALCIUM CHLORIDE 600; 310; 30; 20 MG/100ML; MG/100ML; MG/100ML; MG/100ML
INJECTION, SOLUTION INTRAVENOUS CONTINUOUS
Status: DISCONTINUED | OUTPATIENT
Start: 2025-06-05 | End: 2025-06-05

## 2025-06-05 RX ORDER — LIDOCAINE 40 MG/G
CREAM TOPICAL
Status: DISCONTINUED | OUTPATIENT
Start: 2025-06-05 | End: 2025-06-08 | Stop reason: HOSPADM

## 2025-06-05 RX ORDER — HYDROCORTISONE 25 MG/G
CREAM TOPICAL 3 TIMES DAILY PRN
Status: DISCONTINUED | OUTPATIENT
Start: 2025-06-05 | End: 2025-06-08 | Stop reason: HOSPADM

## 2025-06-05 RX ORDER — CARBOPROST TROMETHAMINE 250 UG/ML
250 INJECTION, SOLUTION INTRAMUSCULAR
Status: DISCONTINUED | OUTPATIENT
Start: 2025-06-05 | End: 2025-06-08 | Stop reason: HOSPADM

## 2025-06-05 RX ORDER — FLUMAZENIL 0.1 MG/ML
0.2 INJECTION, SOLUTION INTRAVENOUS
Status: DISCONTINUED | OUTPATIENT
Start: 2025-06-05 | End: 2025-06-05

## 2025-06-05 RX ORDER — ONDANSETRON 2 MG/ML
4 INJECTION INTRAMUSCULAR; INTRAVENOUS EVERY 30 MIN PRN
Status: DISCONTINUED | OUTPATIENT
Start: 2025-06-05 | End: 2025-06-05

## 2025-06-05 RX ORDER — IBUPROFEN 800 MG/1
800 TABLET, FILM COATED ORAL EVERY 6 HOURS
Status: DISCONTINUED | OUTPATIENT
Start: 2025-06-06 | End: 2025-06-08 | Stop reason: HOSPADM

## 2025-06-05 RX ORDER — CEFAZOLIN SODIUM/WATER 2 G/20 ML
2 SYRINGE (ML) INTRAVENOUS SEE ADMIN INSTRUCTIONS
Status: DISCONTINUED | OUTPATIENT
Start: 2025-06-05 | End: 2025-06-05

## 2025-06-05 RX ORDER — LOPERAMIDE HYDROCHLORIDE 2 MG/1
4 CAPSULE ORAL
Status: DISCONTINUED | OUTPATIENT
Start: 2025-06-05 | End: 2025-06-05

## 2025-06-05 RX ORDER — METOCLOPRAMIDE 10 MG/1
10 TABLET ORAL EVERY 6 HOURS PRN
Status: DISCONTINUED | OUTPATIENT
Start: 2025-06-05 | End: 2025-06-08 | Stop reason: HOSPADM

## 2025-06-05 RX ORDER — ACETAMINOPHEN 325 MG/1
975 TABLET ORAL ONCE
Status: COMPLETED | OUTPATIENT
Start: 2025-06-05 | End: 2025-06-05

## 2025-06-05 RX ORDER — FENTANYL CITRATE 50 UG/ML
INJECTION, SOLUTION INTRAMUSCULAR; INTRAVENOUS
Status: COMPLETED | OUTPATIENT
Start: 2025-06-05 | End: 2025-06-05

## 2025-06-05 RX ORDER — SODIUM CHLORIDE, SODIUM LACTATE, POTASSIUM CHLORIDE, CALCIUM CHLORIDE 600; 310; 30; 20 MG/100ML; MG/100ML; MG/100ML; MG/100ML
INJECTION, SOLUTION INTRAVENOUS CONTINUOUS PRN
Status: DISCONTINUED | OUTPATIENT
Start: 2025-06-05 | End: 2025-06-05

## 2025-06-05 RX ORDER — NALBUPHINE HYDROCHLORIDE 10 MG/ML
2.5-5 INJECTION INTRAMUSCULAR; INTRAVENOUS; SUBCUTANEOUS EVERY 6 HOURS PRN
Status: DISCONTINUED | OUTPATIENT
Start: 2025-06-05 | End: 2025-06-05

## 2025-06-05 RX ORDER — MISOPROSTOL 200 UG/1
400 TABLET ORAL
Status: DISCONTINUED | OUTPATIENT
Start: 2025-06-05 | End: 2025-06-05

## 2025-06-05 RX ADMIN — HYDROMORPHONE HYDROCHLORIDE 0.5 MG: 1 INJECTION, SOLUTION INTRAMUSCULAR; INTRAVENOUS; SUBCUTANEOUS at 11:40

## 2025-06-05 RX ADMIN — SENNOSIDES AND DOCUSATE SODIUM 2 TABLET: 50; 8.6 TABLET ORAL at 20:10

## 2025-06-05 RX ADMIN — PHENYLEPHRINE HYDROCHLORIDE 0.25 MCG/KG/MIN: 10 INJECTION INTRAVENOUS at 10:49

## 2025-06-05 RX ADMIN — MORPHINE SULFATE 0.15 MG: 1 INJECTION EPIDURAL; INTRATHECAL; INTRAVENOUS at 10:34

## 2025-06-05 RX ADMIN — ACETAMINOPHEN 975 MG: 325 TABLET ORAL at 10:03

## 2025-06-05 RX ADMIN — SODIUM CHLORIDE, SODIUM LACTATE, POTASSIUM CHLORIDE, AND CALCIUM CHLORIDE: .6; .31; .03; .02 INJECTION, SOLUTION INTRAVENOUS at 09:18

## 2025-06-05 RX ADMIN — BUPIVACAINE 20 ML: 13.3 INJECTION, SUSPENSION, LIPOSOMAL INFILTRATION at 12:17

## 2025-06-05 RX ADMIN — INSULIN DEGLUDEC 19 UNITS: 100 INJECTION, SOLUTION SUBCUTANEOUS at 20:09

## 2025-06-05 RX ADMIN — BUPIVACAINE HYDROCHLORIDE 20 ML: 2.5 INJECTION, SOLUTION EPIDURAL; INFILTRATION; INTRACAUDAL at 12:17

## 2025-06-05 RX ADMIN — ACETAMINOPHEN 975 MG: 325 TABLET ORAL at 16:10

## 2025-06-05 RX ADMIN — Medication 600 ML/HR: at 11:17

## 2025-06-05 RX ADMIN — ACETAMINOPHEN 975 MG: 325 TABLET ORAL at 22:23

## 2025-06-05 RX ADMIN — SODIUM CITRATE AND CITRIC ACID MONOHYDRATE 30 ML: 500; 334 SOLUTION ORAL at 10:04

## 2025-06-05 RX ADMIN — BUPIVACAINE HYDROCHLORIDE IN DEXTROSE 1.6 ML: 7.5 INJECTION, SOLUTION SUBARACHNOID at 10:34

## 2025-06-05 RX ADMIN — KETOROLAC TROMETHAMINE 15 MG: 15 INJECTION, SOLUTION INTRAMUSCULAR; INTRAVENOUS at 16:10

## 2025-06-05 RX ADMIN — DEXTROSE 50 % IN WATER (D50W) INTRAVENOUS SYRINGE 25 ML: at 11:50

## 2025-06-05 RX ADMIN — Medication 2 G: at 10:32

## 2025-06-05 RX ADMIN — ONDANSETRON 4 MG: 2 INJECTION INTRAMUSCULAR; INTRAVENOUS at 10:23

## 2025-06-05 RX ADMIN — SODIUM CHLORIDE, SODIUM LACTATE, POTASSIUM CHLORIDE, AND CALCIUM CHLORIDE: .6; .31; .03; .02 INJECTION, SOLUTION INTRAVENOUS at 10:17

## 2025-06-05 RX ADMIN — TRANEXAMIC ACID 1 G: 1 INJECTION, SOLUTION INTRAVENOUS at 11:32

## 2025-06-05 RX ADMIN — FENTANYL CITRATE 15 MCG: 50 INJECTION INTRAMUSCULAR; INTRAVENOUS at 10:34

## 2025-06-05 RX ADMIN — KETOROLAC TROMETHAMINE 15 MG: 15 INJECTION, SOLUTION INTRAMUSCULAR; INTRAVENOUS at 22:23

## 2025-06-05 RX ADMIN — METOCLOPRAMIDE 10 MG: 5 INJECTION, SOLUTION INTRAMUSCULAR; INTRAVENOUS at 16:20

## 2025-06-05 RX ADMIN — DEXAMETHASONE SODIUM PHOSPHATE 8 MG: 4 INJECTION, SOLUTION INTRAMUSCULAR; INTRAVENOUS at 11:39

## 2025-06-05 ASSESSMENT — ACTIVITIES OF DAILY LIVING (ADL)
ADLS_ACUITY_SCORE: 20
ADLS_ACUITY_SCORE: 34
ADLS_ACUITY_SCORE: 34
ADLS_ACUITY_SCORE: 20
ADLS_ACUITY_SCORE: 20
ADLS_ACUITY_SCORE: 34
ADLS_ACUITY_SCORE: 34
ADLS_ACUITY_SCORE: 20
ADLS_ACUITY_SCORE: 37
ADLS_ACUITY_SCORE: 38
ADLS_ACUITY_SCORE: 34
ADLS_ACUITY_SCORE: 34
ADLS_ACUITY_SCORE: 38
ADLS_ACUITY_SCORE: 34
ADLS_ACUITY_SCORE: 34

## 2025-06-05 NOTE — ANESTHESIA PROCEDURE NOTES
TAP Procedure Note    Pre-Procedure   Staff -        Anesthesiologist:  Ginger Vasquez MD       Resident/Fellow: Halie Galvan MD       Performed By: resident       Location: pre-op       Procedure Start/Stop Times: 6/5/2025 12:18 PM       Pre-Anesthestic Checklist: patient identified, IV checked, site marked, risks and benefits discussed, informed consent, monitors and equipment checked, pre-op evaluation, at physician/surgeon's request and post-op pain management  Timeout:       Correct Patient: Yes        Correct Procedure: Yes        Correct Site: Yes        Correct Position: Yes        Correct Laterality: Yes        Site Marked: Yes  Procedure Documentation  Procedure: TAP         Diagnosis: PRE PROCEDURE       Laterality: bilateral       Patient Position: supine       Patient Prep/Sterile Barriers: sterile gloves, mask       Skin prep: Chloraprep       Needle Type: Touhy needle and insulated       Needle Gauge: 21.        Needle Length (Inches): 3.13        Needle Length (millimeters): 110        Ultrasound guided       1. Ultrasound was used to identify targeted nerve, plexus, vascular marker, or fascial plane and place a needle adjacent to it in real-time.       2. Ultrasound was used to visualize the spread of anesthetic in close proximity to the above referenced structure.       3. A permanent image is entered into the patient's record.       4. The visualized anatomic structures appeared normal.    Assessment/Narrative         The placement was negative for: blood aspirated, painful injection and site bleeding       Paresthesias: No.       Bolus given via needle..        Secured via.        Insertion/Infusion Method: Single Shot    Medication(s) Administered   Bupivacaine 0.25% PF (Infiltration) - Infiltration   20 mL - 6/5/2025 12:17:00 PM  Bupivacaine liposome (Exparel) 1.3% LA inj susp (Infiltration) - Infiltration   20 mL - 6/5/2025 12:17:00 PM  Medication Administration Time: 6/5/2025 12:18  "PM      FOR [facility]) ONLY:   Pain Team Contact information: please page the Pain Team Via [facility]. Search \"Pain\". During daytime hours, please page the attending first. At night please page the resident first.      "

## 2025-06-05 NOTE — DISCHARGE SUMMARY
[facility] Discharge Summary    Isaiah Jarquin MRN# [id number]   Age: 30 year old YOB: 1995     Date of Admission:  2025  Date of Discharge:  2025  Admitting Physician:  Caitlin Anderson MD  Discharge Physician:  Micheline Rehman MD             Admission Diagnoses:   - at 37w3d  -Polyhydramnios, mild  -suspected macrosomia  -Rubella NI  -T1DM          Discharge Diagnosis:     - at 37w3d  -Polyhydramnios, mild  -Macrosomia  -Rubella NI  -T1DM  -Liveborn male infant          Procedures:     Procedure(s): Primary low transverse  section with single layer uterine closure via Pfannenstiel skin incision  Spinal anesthesia   Bilateral TAP block                Medications Prior to Admission:     Medications Prior to Admission   Medication Sig Dispense Refill Last Dose/Taking    aspirin 81 MG EC tablet Take 81 mg by mouth daily.   2025    insulin lispro (HUMALOG PENFILL) 100 UNIT/ML Cartridge Directed up to 80 units a day. 24 mL 5 2025    triamcinolone (KENALOG) 0.1 % external lotion Apply topically 3 times daily. 60 mL 2 Past Week    blood glucose (NO BRAND SPECIFIED) lancets standard Use to test blood sugar 4 times daily or as directed. 100 each 4 Unknown    blood glucose (NO BRAND SPECIFIED) test strip Use to test blood sugar 3 times daily or as directed. 50 strip 11 Unknown    Continuous Glucose Sensor (FREESTYLE BIGG 3 PLUS SENSOR) MISC Use 1 sensor every 15 days. Use to read blood sugars per 's instructions. 6 each 3 Unknown    Glucagon (BAQSIMI TWO PACK) 3 MG/DOSE nasal powder Spray 1 spray (3 mg) in nostril as needed for severe hypoglycemia. May repeat dose if no response after 15 minutes. 1 each 3 Unknown    Injection Device for insulin (INPEN 100-GREY-YESICA-HUMALOG) RAJIV 1 each daily. 1 each 3 Unknown    Injection Device for insulin (INPEN 100-GREY-NOVOLOG-FIASP) RAJIV 1 each daily. (Patient not taking: Reported on 5/15/2025) 1 each 1  Unknown    insulin aspart (NOVOPEN ECHO) 100 UNIT/ML cartridge Use 1 unit for 6 grams of carb and 1 unit to drop 40 over 150 up to 50 units a day (Patient not taking: No sig reported) 12 mL 3 Unknown    INSULIN GLARGINE 100 UNIT/ML pen INJECT 23 UNITS SUBCUTANEOUS AT BEDTIME (Patient not taking: Reported on 5/15/2025)   Unknown    insulin lispro (HUMALOG KWIKPEN) 100 UNIT/ML (1 unit dial) KWIKPEN Use  1 unit for 6 g CHO with meals  up to 30 units a day (Patient not taking: No sig reported) 15 mL 2 Unknown    insulin pen needle (32G X 4 MM) 32G X 4 MM miscellaneous Use 5 pen needles daily or as directed. 400 each 3 Unknown    NOVOLOG FLEXPEN 100 UNIT/ML soln Use 1 unit for 7 grams up to total of 30 units per day (Patient not taking: No sig reported) 30 mL 3 Unknown    SEMGLEE, YFGN, 100 UNIT/ML SOPN Inject 23 Units Subcutaneous daily (Patient not taking: No sig reported) 15 mL 3 Unknown    [DISCONTINUED] insulin degludec (TRESIBA FLEXTOUCH) 100 UNIT/ML pen Inject 30 Units subcutaneously daily. 15 mL 3 6/4/2025             Discharge Medications:        Review of your medicines        UNREVIEWED medicines. Ask your doctor about these medicines        Dose / Directions   * insulin aspart 100 UNIT/ML cartridge  Commonly known as: NOVOPEN ECHO  Used for: Type 1 diabetes mellitus without complication (H), Pregnancy, incidental      Use 1 unit for 6 grams of carb and 1 unit to drop 40 over 150 up to 50 units a day  Quantity: 12 mL  Refills: 3     * NovoLOG FLEXPEN 100 UNIT/ML soln  Used for: Type 1 diabetes mellitus without complication (H)  Generic drug: insulin aspart      Use 1 unit for 7 grams up to total of 30 units per day  Quantity: 30 mL  Refills: 3     insulin glargine 100 UNIT/ML pen  Generic drug: insulin glargine      INJECT 23 UNITS SUBCUTANEOUS AT BEDTIME  Refills: 0     * insulin lispro 100 UNIT/ML (1 unit dial) KWIKPEN  Commonly known as: HumaLOG KWIKpen  Used for: Type 1 diabetes mellitus without  complication (H)      Use  1 unit for 6 g CHO with meals  up to 30 units a day  Quantity: 15 mL  Refills: 2     Semglee (yfgn) 100 UNIT/ML Sopn  Used for: Type 1 diabetes mellitus with hyperglycemia (H)  Generic drug: Insulin Glargine-yfgn      Dose: 23 Units  Inject 23 Units Subcutaneous daily  Quantity: 15 mL  Refills: 3           * This list has 3 medication(s) that are the same as other medications prescribed for you. Read the directions carefully, and ask your doctor or other care provider to review them with you.                START taking        Dose / Directions   acetaminophen 325 MG tablet  Commonly known as: TYLENOL      Dose: 975 mg  Take 3 tablets (975 mg) by mouth every 6 hours.  Quantity: 1080 tablet  Refills: 0     ferrous sulfate 325 (65 Fe) MG tablet  Commonly known as: FEROSUL      Dose: 325 mg  Take 1 tablet (325 mg) by mouth daily (with breakfast).  Quantity: 90 tablet  Refills: 4     ibuprofen 800 MG tablet  Commonly known as: ADVIL/MOTRIN      Dose: 800 mg  Take 1 tablet (800 mg) by mouth every 6 hours.  Quantity: 240 tablet  Refills: 0     SENNA-docusate sodium 8.6-50 MG tablet  Commonly known as: SENNA S      Dose: 1 tablet  Take 1 tablet by mouth at bedtime.  Quantity: 60 tablet  Refills: 0            CONTINUE these medicines which have NOT CHANGED        Dose / Directions   aspirin 81 MG EC tablet      Dose: 81 mg  Take 81 mg by mouth daily.  Refills: 0     Baqsimi Two Pack 3 MG/DOSE nasal powder  Used for: Pre-existing type 1 diabetes affecting pregnancy, antepartum  Generic drug: Glucagon      Spray 1 spray (3 mg) in nostril as needed for severe hypoglycemia. May repeat dose if no response after 15 minutes.  Quantity: 1 each  Refills: 3     blood glucose lancets standard  Commonly known as: NO BRAND SPECIFIED  Used for: Type 1 diabetes mellitus with hyperglycemia (H)      Use to test blood sugar 4 times daily or as directed.  Quantity: 100 each  Refills: 4     blood glucose test  strip  Commonly known as: NO BRAND SPECIFIED  Used for: Type 1 diabetes mellitus with hyperglycemia (H)      Use to test blood sugar 3 times daily or as directed.  Quantity: 50 strip  Refills: 11     FreeStyle Heriberto 3 Plus Sensor Misc  Used for: Type 1 diabetes mellitus without complication (H), Pregnancy, incidental      Use 1 sensor every 15 days. Use to read blood sugars per 's instructions.  Quantity: 6 each  Refills: 3     * InPen 189-Vtcg-Kfhikbo-Fiasp Aparna  Used for: Type 1 diabetes mellitus without complication (H), Pregnancy, incidental  Generic drug: Injection Device for insulin      Dose: 1 Device  1 each daily.  Quantity: 1 each  Refills: 1     * InPen 642-Ytpz-Ktkln-Humalog Aparna  Used for: Pregnancy complicated by pre-existing type 1 diabetes in second trimester  Generic drug: Injection Device for insulin      Dose: 1 Device  1 each daily.  Quantity: 1 each  Refills: 3     * insulin lispro 100 UNIT/ML Cartridge  Commonly known as: HumaLOG PENFILL  Used for: Pregnancy complicated by pre-existing type 1 diabetes in second trimester      Directed up to 80 units a day.  Quantity: 24 mL  Refills: 5     insulin pen needle 32G X 4 MM miscellaneous  Commonly known as: 32G X 4 MM  Used for: Type 1 diabetes mellitus without complication (H)      Use 5 pen needles daily or as directed.  Quantity: 400 each  Refills: 3     Tresiba FlexTouch 100 UNIT/ML pen  Used for: Type 1 diabetes mellitus without complication (H)  Generic drug: insulin degludec      Dose: 30 Units  Inject 30 Units subcutaneously daily.  Quantity: 15 mL  Refills: 3     triamcinolone 0.1 % external lotion  Commonly known as: KENALOG  Used for: Pruritic urticarial papules and plaques of pregnancy in third trimester      Apply topically 3 times daily.  Quantity: 60 mL  Refills: 2           * This list has 3 medication(s) that are the same as other medications prescribed for you. Read the directions carefully, and ask your doctor or other  care provider to review them with you.                   Where to get your medicines        These medications were sent to [facility] [place], [place] - [address]  [address], [facility] [address]      Phone: [phone]   acetaminophen 325 MG tablet  ferrous sulfate 325 (65 Fe) MG tablet  ibuprofen 800 MG tablet  SENNA-docusate sodium 8.6-50 MG tablet  Tresiba FlexTouch 100 UNIT/ML pen               Consultations:   Anesthesia  Endocrinology  Lactation          Brief Admission History:   Ms. Isaiah Jarquin is a 30 year old now  who initially presented at 37w3d for scheduled primary  due to concerns for polyhydramnios and macrosomia.       Intraoperative course   The procedure was uncomplicated. The infant was delivered atraumatically. The placenta was delivered without complication   mL. See operative report for details.     Findings:   - A single vigorous, liveborn male weighing 4.04kg with apgars of 9 and 9.  - Normal appearing uterus, fallopian tubes, ovaries.    - No nuchal cord. Clear amniotic fluid.   - No intraabdominal no recto-fascial adhesions.       Postpartum Course   The patient's hospital course was unremarkable.  She recovered as anticipated and experienced no post-operative complications. On discharge, her pain was well controlled. Vaginal bleeding is similar to peak menstrual flow.  Voiding without difficulty.  Ambulating well and tolerating a normal diet.  No fever or significant wound drainage.  Breastfeeding well.  Infant is stable.  No bowel movement yet.  She was discharged on post-partum day #2.she had adequate pain control and declined a prescription for oxycodone.     Post-partum hemoglobin:   Hemoglobin   Date Value Ref Range Status   2025 9.1 (L) 11.7 - 15.7 g/dL Final   2012 14.2 11.7 - 15.7 g/dL Final     Contraception: OCPs  Rhogam: Not indicated - patient is Rh positive.   Rubella NI: MMR vaccine PTD            Discharge Instructions and Follow-Up:     Discharge diet: Regular   Discharge activity: No lifting greater than 20 lbs, pushing, pulling, or other strenuous activity for 6 weeks. Pelvic rest for 6 weeks including no sexual intercourse, tampons, or douching. No driving until you can slam on the brakes without pain or while on narcotic pain medications.    Discharge follow-up: Follow up with primary OB for routine postpartum visit in 6 weeks   Wound care: Keep incision clean and dry           Discharge Disposition:     Discharged to home in stable condition      Manuela Garrido DO, MPH  Obstetrics and Gynecology, PGY-1  06/08/25, 11:08 AM       Physician Attestation   I, Micheline Rehman, have seen and examined this patient.  I have reviewed the above note and agree with discharge plan as documented in the above note.     Micheline Rehman MD  Date of Service (when I saw the patient): 06/08/25

## 2025-06-05 NOTE — ANESTHESIA CARE TRANSFER NOTE
Patient: Isaiah Jarquin    Procedure: Procedure(s):   SECTION       Diagnosis: Type 1 diabetes mellitus in pregnancy, third trimester [O24.013]  Fetal macrosomia during pregnancy in third trimester, single or unspecified fetus [O36.63X0]  Diagnosis Additional Information: No value filed.    Anesthesia Type:   Spinal     Note:    Oropharynx: oropharynx clear of all foreign objects and spontaneously breathing  Level of Consciousness: awake  Oxygen Supplementation: room air    Independent Airway: airway patency satisfactory and stable  Dentition: dentition unchanged  Vital Signs Stable: post-procedure vital signs reviewed and stable  Report to RN Given: handoff report given  Patient transferred to: PACU    Handoff Report: Identifed the Patient, Identified the Reponsible Provider, Reviewed the pertinent medical history, Discussed the surgical course, Reviewed Intra-OP anesthesia mangement and issues during anesthesia, Set expectations for post-procedure period and Allowed opportunity for questions and acknowledgement of understanding      Vitals:  Vitals Value Taken Time   /69    Temp     Pulse 81    Resp 16    SpO2 100        Electronically Signed By: Halie Galvan MD  2025  12:20 PM

## 2025-06-05 NOTE — PLAN OF CARE
Upon entry to room at 1815, patient had just finished injecting herself with home humalog before planning to eat [employer] sub for dinner. Per patient her outpatient endocrinologist told her to bring her home supply of insulin with her for hospital stay, educated patient that we have both short and long-acting insulin for her up on the floor to correct with. Patient will have  bring home insulin home and use hospital supply. Per patient she gave her self 13 units of the Humalog, blood sugar checked with hospital meter at 197. Per patient she gave herself 1 unit to correct for preprandial blood sugar and the other 12 at a 1:7 carb correction ratio. Emphasized that we have to take her blood sugar using our meter and correct with our insulin moving forward. Patient agreeable to this plan.

## 2025-06-05 NOTE — PROGRESS NOTES
Patient arrived to the unit for scheduled  section at 0819.     Data: Patient presented to [facility]: 2025  8:19 AM.  Reason for maternal/fetal assessment is scheduled  section. Patient denies uterine contractions, leaking of vaginal fluid/rupture of membranes, vaginal bleeding, abdominal pain, pelvic pressure, nausea, vomiting, headache, visual disturbances, epigastric or RUQ pain, significant edema. Patient reports fetal movement is normal. Patient is a 37w3d   Prenatal record reviewed. Pregnancy has been complicated by diabetes, oligohydramnios, and LGA.    Vital signs wnl. Support person is present.     Action: Verbal consent for EFM. Triage assessment completed.     Response: Patient verbalized agreement with plan. Providers notified and updated.

## 2025-06-05 NOTE — ANESTHESIA PROCEDURE NOTES
"Intrathecal injection Procedure Note    Pre-Procedure   Staff -        Anesthesiologist:  Ginger Vasquez MD       Resident/Fellow: Halie Galvan MD       Performed By: resident and anesthesiologist       Location: OR       Procedure Start/Stop Times: 6/5/2025 10:34 AM and 6/5/2025 10:50 AM       Pre-Anesthestic Checklist: patient identified  Timeout:       Correct Patient: Yes        Correct Site: Yes   Procedure Documentation  Procedure: intrathecal injection         Patient Position: sitting       Skin prep: Chloraprep       Insertion Site: L4-5. (midline approach).       Needle Gauge: 25.        Spinal Needle Type: Pencan       Introducer used       # of attempts: 3 and  # of redirects:     Assessment/Narrative         Paresthesias: No.       Sensory Level: T9       CSF fluid: clear.       Opening pressure was cmH2O while  Sitting.      Medication(s) Administered   0.75% Hyperbaric Bupivacaine (Intrathecal) - Intrathecal   1.6 mL - 6/5/2025 10:34:00 AM  Fentanyl PF (Intrathecal) - Intrathecal   15 mcg - 6/5/2025 10:34:00 AM  Morphine PF 1 mg/mL (Intrathecal) - Intrathecal   0.15 mg - 6/5/2025 10:34:00 AM  Medication Administration Time: 6/5/2025 10:34 AM      FOR [facility] ([facility]) ONLY:   Pain Team Contact information: please page the Pain Team Via [email or web address]. Search \"Pain\". During daytime hours, please page the attending first. At night please page the resident first.      "

## 2025-06-05 NOTE — CONSULTS
"  Inpatient Diabetes Management Service : New Consult Note     Patient: Isaiah Jarquin   YOB: 1995    MRN: [id number]     Date of Consult : 2025   Date of Admission: 2025     Reason for Consult: \"T1DM - ok for order review and chart check consult if you prefer\"   Consult Requestor: Kulwant Payne MD            History of Present Illness:   Isaiah Jarquin is a 30 year old  s/p  25 with a PMH significant for Type 1 Diabetes Mellitus. Inpatient Diabetes Service consulted for review of glycemic control and insulin adjustment in the post-partum period.     History obtained via the patient, chart review and discussion with primary team.       Additional Historian:  none    Patient is not known to the Inpatient Diabetes Service from past admission(s).    Last dose insulin PTA: Did take Tresiba 29 units night of .  Current inpatient regimen:    - Tresiba 19 units at HS  - Novolog ICR 1:7 TID AC  -   Novolog correction 1:50>140 TID AC, >200 HS  BG at time of consult: 126 mg/dL  Planned Procedures/Surgeries: none    Relevant Labs on Admission:  if contributory, otherwise see labs below    Inpatient Glucose Trends:           Diabetes History:   Diabetes Type and Duration: T1DM since   GAD65 antibody, zinc transporter 8 antibody, islet antibody, insulin antibody, and c-peptide not available on epic search     PTA Medication Regimen:   Pregnancy doses:  - Tresiba 29 units at 2000  - Novolog via InPen (uses to log insulin and half-unit dosing capability)   ICR: 1:3   ISF: 1:25   AIT: 4 hours  Missing doses?: denies  Historical Diabetes Medications: insulins    Glucose monitoring device/frequency/trends: Heriberto 3 sensor  Hypoglycemia PTA:   - Awareness:  intact      Outpatient Diabetes Provider: [facility]: Dr. Butler (LOV 6/3/25), Marlyn Barrett (LOV 25)  Formal Diabetes Education/Educator: yes    ------------------------  Complications (per " chart review):  no peripheral neuropathy, no retinopathy (last eye exam: due--potential change during pregnancy), no nephropathy, no gastroparesis, no macrovascular disease      Most recent A1c: 7.5% 5/16/25    Hemoglobin at time of most recent A1c: none  RBC transfusion 3 months prior to most recent A1c:  none      History of DKA: none per chart review    Safety Plan:   - Glucagon: +   - Ketone Strips: not asked  Medic Alert if Type 1: not asked    Diet/Lifestyle/Living Situation: not discussed    Ability to [place] Prescribed Regimen:  no concerns   Food/Housing Insecurity: not assessed          Medications Impacting Glycemia:    Steroids: Dexamethasone 8mg 6/5 at 1139  D5W containing solutions/medications: none  Other medications impacting glucose: none         Diet/Nutrition:    Orders Placed This Encounter      Advance Diet as Tolerated: Clear Liquid Diet      NPO for Medical/Clinical Reasons Except for: Meds      Low Consistent Carb (45 g CHO per Meal) Diet     Supplements:  none  TF: none  TPN: none          Review of Systems:    Constitutional: feeling well  Respiratory: no SOB   GI: tolerating some crackers  MSK/Integumentary: no issues at injection sites         Past Medical History:       Past Medical History:   Diagnosis Date    Type 1 diabetes mellitus (H)              Past Surgical History:    History reviewed. No pertinent surgical history.          Social History:      Social History     Tobacco Use    Smoking status: Never     Passive exposure: Never    Smokeless tobacco: Never   Substance Use Topics    Alcohol use: Not Currently        History   Sexual Activity    Sexual activity: Yes    Partners: Male             Family History:      Family History   Problem Relation Age of Onset    No Known Problems Mother     Colon Cancer Father     No Known Problems Maternal Grandmother     No Known Problems Maternal Grandfather     No Known Problems Paternal Grandmother     Bone Cancer Paternal Grandfather   "   Lung Cancer Paternal Grandfather     No Known Problems Brother     No Known Problems Brother                Physical Exam:    /84 (BP Location: Right arm, Patient Position: Semi-Magaña's, Cuff Size: Adult Regular)   Pulse 81   Temp 97  F (36.1  C) (Oral)   Resp 12   Ht 1.646 m (5' 4.8\")   Wt 91.2 kg (201 lb)   LMP 09/16/2024   SpO2 98%   Breastfeeding Unknown   BMI 33.65 kg/m     General:  well appearing, in no acute distress.  HEENT:  sclera not injected  Lungs:  no cough, no SOB  ABD:  rounded  Skin:  warm and dry, no obvious lesions  MSK:   moving all extremities  Mental Status:  Alert and oriented x3  Psych:   Cooperative, good eye contact, full/appropriate affect         Laboratory Data:      Lab Results   Component Value Date    A1C 7.5 (H) 05/16/2025    A1C 6.7 (H) 02/25/2025    A1C 6.9 (H) 12/11/2024    A1C 7.6 (H) 03/18/2024    A1C 8.1 (H) 10/11/2023    A1C 8.0 (H) 12/10/2020    A1C 9.1 (H) 01/24/2020     Recent Labs   Lab Test 06/02/25  1457   WBC 10.0   RBC 4.39   HGB 11.3*   HCT 36.1   MCV 82   MCH 25.7*   MCHC 31.3*   RDW 15.5*        Recent Labs   Lab Test 06/05/25  1225 06/05/25  1114 06/05/25  0846 12/11/24  1036 03/18/24  1432   NA  --   --   --  136 139   POTASSIUM  --   --   --  4.2 3.8   CHLORIDE  --   --   --  102 104   CO2  --   --   --  24 25   ANIONGAP  --   --   --  10 10   * 91   < > 149* 55*   BUN  --   --   --  10.9 11.9   CR  --   --   --  0.67 0.83   GAIL  --   --   --  9.6 9.4    < > = values in this interval not displayed.     Recent Labs   Lab Test 12/11/24  1036   PROTTOTAL 6.9   ALBUMIN 4.3   BILITOTAL 0.3   ALKPHOS 69   AST 20   ALT 16            Assessment and Recommendations:       Assessment:   Type 1 Diabetes Mellitus without complication. Good control  (A1c 7.5 % 5/16/25, GMI 6.0% 5/20-6/2/25)    Plan/Recommendations:    - Tresiba 19 units q 24 hrs at 2000 **hold night of 6/5 per pt's discretion  - Novolog Meal Coverage: 1 units per 7 g CHO, TID " AC and PRN with snacks/supplements   - Novolog Correction Scale: 1:50>140 TID AC, >200 HS, 0200 (medium resistance)  - BG monitoring: TID AC, HS, 0200  - Hypoglycemia protocol    - Carb counting protocol     Discussion:  Pt post-. Did receive 8mg of dexamethasone in the OR which could drive blood sugars up in the next 36-48 hours (typically would see a significant drop in needs in the early post-partum period). Saw pt to ensure setting changes had been made with InPen and discussed impacts of breastfeeding on BG. Also discussed that given she had a higher dose of Tresiba last night she is at increased risk of hypoglycemia as it takes a few days for decreased dose adjustments to stabilize. She had discussed with Dr. Butler holding Tresiba dose the night of delivery if BG running low. I agree with this recommendation (though suspect BG may be on higher side given dexamethasone use).     Discharge Planning: (tentative)    Medications: No new supplies needed. Possible adjustment to dosing    Test Claims:  none needed.   Education: None needed   Outpatient Follow-up:  recommend [facility] as scheduled 25 with Kimberly Barrett (sooner if having issues with blood sugars).      Thank you for this consult. IDS will continue to follow peripherally and see pt if dose changes are needed.      Please notify Inpatient Diabetes Service if changes are planned to steroids, nutrition, TPN/TF and anticipated procedures requiring prolonged NPO status.     Coco Tamez PA-C  Inpatient Diabetes Service  Available on PLUQ or Secure Chat     To contact Inpatient Diabetes Service:     7 AM - 5 PM: Page the IDS JAYCE following the patient that day (see filed or incomplete progress notes/consult notes under Endocrinology)    OR if uncertain of provider assignment: page job code [phone]    5 PM - 7 AM: First call after hours is to primary service.    For urgent after-hours questions, page job code for on call fellow:  [phone]     I spent a total of 60 minutes on the date of the encounter doing prep/post-work, chart review, history and exam, documentation and further activities per the note including lab review, multidisciplinary communication, counseling the patient and/or coordinating care regarding acute hyper/hypoglycemic management, as well as discharge management and planning/communication.  See note for details.

## 2025-06-05 NOTE — PROGRESS NOTES
Patient transferred from OR to L&D PACU at 1230 in a cart. Report received from anesthesia team. Post operative blood glucose checked, 117. VSS. Assessments WNL. PIV saline locked. Lopez in place, draining clear yellow urine. SCD pumps applied. Patient denies pain. Fundus firm without massage and at midline at umbilicus. Lochia WNL. Ice chips and crackers provided, patient tolerated well. Baby bonding well with parents.

## 2025-06-05 NOTE — ANESTHESIA POSTPROCEDURE EVALUATION
Patient: Isaiha Jarquin    Procedure: Procedure(s):   SECTION       Anesthesia Type:  Spinal    Note:  Disposition: Admission   Postop Pain Control: Uneventful            Sign Out: Well controlled pain   PONV: No   Neuro/Psych: Uneventful            Sign Out: Acceptable/Baseline neuro status   Airway/Respiratory: Uneventful            Sign Out: Acceptable/Baseline resp. status   CV/Hemodynamics: Uneventful            Sign Out: Acceptable CV status; No obvious hypovolemia; No obvious fluid overload   Other NRE: NONE   DID A NON-ROUTINE EVENT OCCUR?            Last vitals:  Vitals:    25 0835   BP: 113/76   Resp: 16   Temp: 36.8  C (98.3  F)       Electronically Signed By: Ginger Vasquez MD  2025  12:31 PM

## 2025-06-05 NOTE — PLAN OF CARE
Patient arrived to [facility] unit via zoomcart at 1425, with belongings. Accompanied by spouse and family members, with infant in arms. Received report from RENÉE Gonzales. Per this report patient does not need to be on D5LR fluids and was not given toradol in the PACU. Patient complaining of some nausea on transfer which improved after resting for a few minutes. Encouraged oral fluids and snacks. Per Dr. Payne will not correct carbs for snacks for now, see order set for other carb correction parameters. Bands double checked. Oriented patient to room and unit. Call light given and no concerns present at this time.

## 2025-06-05 NOTE — PROGRESS NOTES
Data: Isaiah Jarquin transferred to [id number] via cart at 1425. Baby transferred via parent's arms.  Action: Receiving unit notified of transfer: Yes. Patient and family notified of room change. Report given to RENÉE san at 1356. Belongings sent to receiving unit. Accompanied by Registered Nurse. Oriented patient to surroundings. Call light within reach. ID bands double-checked with receiving RN.  Response: Patient tolerated transfer and is stable.

## 2025-06-05 NOTE — PROVIDER NOTIFICATION
06/05/25 1710   Provider Notification   Provider Name/Title Coco Tamez PA-C   Method of Notification Electronic Page   Request Evaluate-Remote   Notification Reason Medication Request     Isaiah hesitant to take her evening 19 units of Tresiba due to not eating much today. She's been nauseous much of the afternoon and has only had some giovanni crackers and a few bites of sandwich. What do you think? Per provider, if blood sugars are less than 200 overnight okay to hold Tresiba until the morning, if she goes more than 24 hours without eating anything substantial would recommend starting dextrose fluids and if dextrose fluids are started would recommend giving the Tresiba at that time.

## 2025-06-05 NOTE — PLAN OF CARE
Problem: Diabetes  Goal: Blood Glucose Level Within Target Range  Outcome: Progressing     Problem: Postpartum ( Delivery)  Goal: Nausea and Vomiting Relief  Outcome: Progressing  Goal: Effective Oxygenation and Ventilation  Outcome: Progressing   Goal Outcome Evaluation:  Isaiah intermittently nauseous this afternoon and evening, reglan given with some relief. Not feeling very hungry so hasn't eaten much. See page to endocrine regarding recommendations if continued nausea/not eating. All other VSS and postpartum assessments WDL.  Up ad raymundo with steady gait and independent with cares.  Bonding well with infant.  Attempted breastfeedsx2, infant very sleepy at breast so hand expression for 4 mL done and fed back finger. Lactation consult released for tomorrow.  Pain managed with tylenol and toradol.  Spouse, Elijah, present and supportive.  Will continue with postpartum cares and education per plan of care.

## 2025-06-06 LAB
GLUCOSE BLDC GLUCOMTR-MCNC: 141 MG/DL (ref 70–99)
GLUCOSE BLDC GLUCOMTR-MCNC: 146 MG/DL (ref 70–99)
GLUCOSE BLDC GLUCOMTR-MCNC: 158 MG/DL (ref 70–99)
GLUCOSE BLDC GLUCOMTR-MCNC: 95 MG/DL (ref 70–99)
HGB BLD-MCNC: 9.1 G/DL (ref 11.7–15.7)
MCV RBC AUTO: 82 FL (ref 78–100)

## 2025-06-06 PROCEDURE — 85018 HEMOGLOBIN: CPT

## 2025-06-06 PROCEDURE — 250N000011 HC RX IP 250 OP 636: Mod: JZ

## 2025-06-06 PROCEDURE — 120N000002 HC R&B MED SURG/OB UMMC

## 2025-06-06 PROCEDURE — 36415 COLL VENOUS BLD VENIPUNCTURE: CPT

## 2025-06-06 PROCEDURE — 99232 SBSQ HOSP IP/OBS MODERATE 35: CPT | Mod: 24 | Performed by: NURSE PRACTITIONER

## 2025-06-06 PROCEDURE — 250N000013 HC RX MED GY IP 250 OP 250 PS 637

## 2025-06-06 PROCEDURE — 250N000012 HC RX MED GY IP 250 OP 636 PS 637

## 2025-06-06 RX ADMIN — ACETAMINOPHEN 975 MG: 325 TABLET ORAL at 04:45

## 2025-06-06 RX ADMIN — ACETAMINOPHEN 975 MG: 325 TABLET ORAL at 10:17

## 2025-06-06 RX ADMIN — INSULIN ASPART 10 UNITS: 100 INJECTION, SOLUTION INTRAVENOUS; SUBCUTANEOUS at 18:31

## 2025-06-06 RX ADMIN — IBUPROFEN 800 MG: 800 TABLET ORAL at 10:17

## 2025-06-06 RX ADMIN — INSULIN DEGLUDEC 15 UNITS: 100 INJECTION, SOLUTION SUBCUTANEOUS at 21:16

## 2025-06-06 RX ADMIN — IBUPROFEN 800 MG: 800 TABLET ORAL at 22:32

## 2025-06-06 RX ADMIN — KETOROLAC TROMETHAMINE 15 MG: 15 INJECTION, SOLUTION INTRAMUSCULAR; INTRAVENOUS at 04:45

## 2025-06-06 RX ADMIN — SIMETHICONE 80 MG: 80 TABLET, CHEWABLE ORAL at 16:20

## 2025-06-06 RX ADMIN — SENNOSIDES AND DOCUSATE SODIUM 2 TABLET: 50; 8.6 TABLET ORAL at 21:17

## 2025-06-06 RX ADMIN — ACETAMINOPHEN 975 MG: 325 TABLET ORAL at 22:32

## 2025-06-06 RX ADMIN — ACETAMINOPHEN 975 MG: 325 TABLET ORAL at 16:20

## 2025-06-06 RX ADMIN — SENNOSIDES AND DOCUSATE SODIUM 2 TABLET: 50; 8.6 TABLET ORAL at 08:02

## 2025-06-06 RX ADMIN — IBUPROFEN 800 MG: 800 TABLET ORAL at 16:20

## 2025-06-06 ASSESSMENT — ACTIVITIES OF DAILY LIVING (ADL)
ADLS_ACUITY_SCORE: 35
ADLS_ACUITY_SCORE: 37
ADLS_ACUITY_SCORE: 35
ADLS_ACUITY_SCORE: 37
ADLS_ACUITY_SCORE: 37
ADLS_ACUITY_SCORE: 35
ADLS_ACUITY_SCORE: 37
ADLS_ACUITY_SCORE: 35
ADLS_ACUITY_SCORE: 37
ADLS_ACUITY_SCORE: 35

## 2025-06-06 NOTE — PLAN OF CARE
[id number]:  Vital signs stable. Postpartum assessment WDL. Incision dressing C/D/I. Pain controlled with Tylenol and Ibuprofen. Patient ambulating with steady gait. Patient reports passing gas. Breastfeeding on cue with minimal assist. Patient and infant bonding well. Continue with current plan of care.       Goal Outcome Evaluation:      Plan of Care Reviewed With: patient, spouse    Overall Patient Progress: improvingOverall Patient Progress: improving     Problem: Adult Inpatient Plan of Care  Goal: Plan of Care Review  Outcome: Progressing  Flowsheets (Taken 6/6/2025 0219)  Plan of Care Reviewed With:   patient   spouse  Overall Patient Progress: improving  Goal: Optimal Comfort and Wellbeing  Outcome: Progressing  Intervention: Monitor Pain and Promote Comfort  Recent Flowsheet Documentation  Taken 6/5/2025 2310 by Juju Richards RN  Pain Management Interventions: declines  Intervention: Provide Person-Centered Care  Recent Flowsheet Documentation  Taken 6/6/2025 0020 by Juju Richards RN  Trust Relationship/Rapport:   care explained   choices provided   questions encouraged   questions answered   thoughts/feelings acknowledged  Goal: Readiness for Transition of Care  Outcome: Progressing     Problem: Diabetes  Goal: Optimal Coping  Outcome: Progressing  Intervention: Support Wellbeing and Self-Management Success  Recent Flowsheet Documentation  Taken 6/6/2025 0020 by Juju Richards, RN  Supportive Measures: active listening utilized  Family/Support System Care:   presence promoted   involvement promoted   self-care encouraged   support provided  Goal: Optimal Functional Ability  Outcome: Progressing  Intervention: Optimize Functional Ability  Recent Flowsheet Documentation  Taken 6/6/2025 0020 by Juju Richards RN  Activity Management: activity adjusted per tolerance  Activity Assistance Provided: independent    Goal: Blood Glucose Level Within Target Range  Outcome: Progressing  Goal: Minimize Risk of  Hypoglycemia  Outcome: Progressing     Problem: Postpartum ( Delivery)  Goal: Successful Parent Role Transition  Outcome: Progressing  Intervention: Support Parent Role Transition  Recent Flowsheet Documentation  Taken 2025 by Juju Richards RN  Supportive Measures: active listening utilized  Parent-Child Attachment Promotion: caring behavior modeled  Goal: Effective Bowel Elimination  Outcome: Progressing  Intervention: Enhance Bowel Motility and Elimination  Recent Flowsheet Documentation  Taken 2025 by Juju Richards RN  Bowel Elimination Promotion:   ambulation promoted   adequate fluid intake promoted  Goal: Fluid and Electrolyte Balance  Outcome: Progressing  Intervention: Monitor and Manage Fluid and Electrolyte Balance  Recent Flowsheet Documentation  Taken 2025 by Juju Richards RN  Fluid/Electrolyte Management: fluids provided  Goal: Absence of Infection Signs and Symptoms  Outcome: Progressing  Intervention: Prevent or Manage Infection  Recent Flowsheet Documentation  Taken 2025 by Juju Richards RN  Infection Management: aseptic technique maintained  Goal: Optimal Pain Control and Function  Outcome: Progressing  Intervention: Prevent or Manage Pain  Recent Flowsheet Documentation  Taken 2025 by Juju Richards RN  Perineal Care:   absorbent brief/pad changed   perineum cleansed  Goal: Nausea and Vomiting Relief  Outcome: Progressing  Goal: Effective Urinary Elimination  Outcome: Progressing

## 2025-06-06 NOTE — PROVIDER NOTIFICATION
06/05/25 2046   Provider Notification   Provider Name/Title Mira   Method of Notification Electronic Page   Request Evaluate-Remote   Notification Reason Other  (Blood sugar 268)     Pt concerned re blood sugar = 268. Sent FYI to provider. No new orders.

## 2025-06-06 NOTE — LACTATION NOTE
This note was copied from a baby's chart.  Consult for: Early Term Infant    Infant Name: Agustin    Infant's Primary Care Clinic: [facility]    Delivery Information:  Agustin was born at Gestational Age: 37w3d via   delivery on 2025 11:15 AM     Breastfeeding goal (if known): exclusive breastfeeding/pumping    Maternal Health History:    Information for the patient's mother:  Isaiah Jarquin [[id number]]     Past Medical History:   Diagnosis Date    Type 1 diabetes mellitus (H)     and   Information for the patient's mother:  Isaiah Jarquin [[id number]]     Medications Prior to Admission   Medication Sig Dispense Refill Last Dose/Taking    aspirin 81 MG EC tablet Take 81 mg by mouth daily.   2025    insulin degludec (TRESIBA FLEXTOUCH) 100 UNIT/ML pen Inject 30 Units subcutaneously daily. 15 mL 3 2025    insulin lispro (HUMALOG PENFILL) 100 UNIT/ML Cartridge Directed up to 80 units a day. 24 mL 5 2025    triamcinolone (KENALOG) 0.1 % external lotion Apply topically 3 times daily. 60 mL 2 Past Week    blood glucose (NO BRAND SPECIFIED) lancets standard Use to test blood sugar 4 times daily or as directed. 100 each 4 Unknown    blood glucose (NO BRAND SPECIFIED) test strip Use to test blood sugar 3 times daily or as directed. 50 strip 11 Unknown    Continuous Glucose Sensor (FREESTYLE BIGG 3 PLUS SENSOR) MISC Use 1 sensor every 15 days. Use to read blood sugars per 's instructions. 6 each 3 Unknown    Glucagon (BAQSIMI TWO PACK) 3 MG/DOSE nasal powder Spray 1 spray (3 mg) in nostril as needed for severe hypoglycemia. May repeat dose if no response after 15 minutes. 1 each 3 Unknown    Injection Device for insulin (INPEN 100-GREY-YESICA-HUMALOG) RAJIV 1 each daily. 1 each 3 Unknown    Injection Device for insulin (INPEN 100-GREY-NOVOLOG-FIASP) RAJIV 1 each daily. (Patient not taking: Reported on 5/15/2025) 1 each 1 Unknown    insulin aspart (NOVOPEN ECHO) 100 UNIT/ML  cartridge Use 1 unit for 6 grams of carb and 1 unit to drop 40 over 150 up to 50 units a day (Patient not taking: No sig reported) 12 mL 3 Unknown    INSULIN GLARGINE 100 UNIT/ML pen INJECT 23 UNITS SUBCUTANEOUS AT BEDTIME (Patient not taking: Reported on 5/15/2025)   Unknown    insulin lispro (HUMALOG KWIKPEN) 100 UNIT/ML (1 unit dial) KWIKPEN Use  1 unit for 6 g CHO with meals  up to 30 units a day (Patient not taking: No sig reported) 15 mL 2 Unknown    insulin pen needle (32G X 4 MM) 32G X 4 MM miscellaneous Use 5 pen needles daily or as directed. 400 each 3 Unknown    NOVOLOG FLEXPEN 100 UNIT/ML soln Use 1 unit for 7 grams up to total of 30 units per day (Patient not taking: No sig reported) 30 mL 3 Unknown    SEMGLEE, YFGN, 100 UNIT/ML SOPN Inject 23 Units Subcutaneous daily (Patient not taking: No sig reported) 15 mL 3 Unknown       Maternal Breast Exam:  Isaiah noted breast growth and sensitivity in early pregnancy. She denies any history of breast/chest injury or surgery. Her breasts are soft and symmetrical with bilateral intact nipples. She has been able to hand express colostrum. ?    Breastfeeding/ Lactation History: first baby    Oral exam of baby:  Agustin has normal jaw, normal palate, good length of tongue beyond lingual frenulum attachment, good extension well beyond gum ridge & organized when sucking on finger. He does have a tight jaw and needs encouragement to suck.    Infant information: Agustin was LGA at birth and has age appropriate output and weight loss.     Weight Change Since Birth: -6 at 1 day old     Feeding History:  Agustin has not latched well since delivery yesterday.  He has been sleepy and has only a few successful breastfeeding sessions, but none that are sustained.  He has received hand expressed MBM via spoon multiple times.  A nipple shield was introduced but he hasn't been showing feeding cues.  Per parents, they saw him suck on his fingers just once today.      Feeding  Assessment:  Agustin was brought to the left breast in a football hold.  He was crying when the breast was introduced so his mouth was open wide when he was brought to the breast.  He was able to get his mouth in a good latch position, but he did not suck.  After about 5 minutes, the nipple shield was introduced and he again, would open his mouth wide enough to get the nipple in, but no attempt was made at sucking.  After about 10 additional minutes, we discussed starting to pump. While the pump/parts were gathered, Agustin did make a few sucking motions, but not sustained.  The decision was made to start pumping.  With the parents, we discussed attempting a feeding for about 15 minutes, but then moving to pump while someone bottle/cup feeds Agustin.  Discussed donor milk vs formula and parents will let the nurse know what they would prefer.    Education:   [x] Potential feeding challenges of early term infants  [x] Expected  feeding patterns in the first few days (pg. 38 of Your Guide to To Postpartum and Victor Care)/ the Second Night  [x] Stages of milk production  [x] Benefits of hand expression of colostrum  [x] Early feeding cues   [x] Feeding frequency- encourage at least every 3 hours.     [x] Benefits of feeding on cue  [x] Benefits of skin to skin  [x] Breastfeeding positions  [x] Tips to get and maintain a deep latch  [x] Nutritive vs.non-nutritive sucking  [] Gentle breast compressions as needed to enhance milk transfer  [] How to tell when baby is finished  [] How to tell if baby is getting enough  [x] Expected  output  [x] Victor weight loss  [x] Infant Feeding Log  [x] [employer] Breastfeeding/Pumping videos  [] Signs breastfeeding is going well (comfortable latch, audible swallows, age appropriate output and weight loss)    [] Tips to prevent engorgement  [] Signs of engorgement  [] Tips to manage engorgement  [x] Hand expression and pumping recommendations  [x] Supplement  recommendations   [x] Satiety cues   [x] [facility] breast pump part/infant feeding supplies cleaning recommendations  [x] Inpatient breastfeeding support  [] Outpatient lactation resources and follow up recommendations    Handouts: Infant Feeding Log (Week 1, Your Guide to Postpartum & [facility] Care Book) and [facility] Lactation Resources    Home Breast Pump: not discussed     Plan (Early Term): Frequent skin to skin, watch for early feeding cues and breastfeed on cue with goal of 8 to 12 feedings in 24 hours. Go no longer than 3 hours between feedings. Encourage breast compressions to enhance milk transfer when infant at the breast.    For now, attempt feedings every 3 hours, but if there are no feeding cues and no latch, Isaiah should pump and Agustin should be supplemented with MBM/DBM/formula.  Monitor output and weight loss.       Evelyne Wiseman RN, IBCLC   Lactation Consultant  [place]: [facility] [phone]  Office: [phone]

## 2025-06-06 NOTE — PROGRESS NOTES
Inpatient Diabetes Management Service: Daily Progress Note     HPI: Isaiah Jarquin is a 30 year old  s/p  25 with a PMH significant for Type 1 Diabetes Mellitus. Inpatient Diabetes Service consulted for review of glycemic control and insulin adjustment in the post-partum period.             Assessment/Plan:     Assessment:   Type 1 Diabetes Mellitus without complication. Good control  (A1c 7.5 % 25, GMI 6.0% -25)     Plan/Recommendations:    - Decrease Tresiba 13 units q 24 hrs at 2000   - Novolog Meal Coverage: 1 units per 7 g CHO, TID AC and PRN with snacks/supplements Decrease to 1 per 15 starting with snacks tonight.   - Novolog Correction Scale: 1:50>140 TID AC, >200 HS, 0200 (medium resistance)  - BG monitoring: TID AC, HS, 0200  - Hypoglycemia protocol    - Carb counting protocol      Discussion:  Received 8mg of dexamethasone in the OR  at 1139. Which should be in effect until 1900 tonight and may last until 11 am on . BG elevated to 290 Reviewed with patient if she had missed carb coverage for dinner. She reports she had given herself insulin coverage per her own pen.  Per endocrinologist, recommended, after delivery insulin dose should be Tresiba 19 units daily.  At mealtime use a ratio of 1 unit for 7 g of carb.  A good correction factor to start on is something like 1 to drop you 35.  Hgb 9.1 today    Among women with type 1 diabetes, insulin requirements typically return to prepregnancy levels or lower following delivery. Women are typically advised to decrease basal and prandial insulin doses to 50 to 80% of their preconception doses. Previous to pregnancy, she was taking  21 units of Basaglar at night and carb ratio was a 1 per 6. Patient is currently breast feeding.  Will Dex waning, will further reduce Tresiba dose to 13 units and starting with snacks tonight, will reduce carb coverage to 1 per 15. Reviewed with patient she may run  high. But will error on the side of caution. She changed her dexcom settings to be alerted if BG is less than 90. For tomorrow, will change settings in InPen pending BG trends.  Per primary, anticipated in 2-4 Days        Discharge Planning: (tentative)    Medications: No new supplies needed. Possible adjustment to dosing    Test Claims:  none needed.   Education: None needed   Outpatient Follow-up:  recommend [facility] as scheduled 9/23/25 with Kimberly Barrett (sooner if having issues with blood sugars).        Thank you for this consult. IDS will continue to follow peripherally and see pt if dose changes are needed.       Please notify Inpatient Diabetes Service if changes are planned to steroids, nutrition, TPN/TF and anticipated procedures requiring prolonged NPO status.      To contact Inpatient Diabetes Service:     7 AM - 5 PM: Page the IDS JAYCE following the patient that day (see filed or incomplete progress notes/consult notes under Endocrinology)    OR if uncertain of provider assignment: page job code [phone]    5 PM - 7 AM: First call after hours is to primary service.    For urgent after-hours questions, page job code for on call fellow: [phone]     Please notify Inpatient Diabetes Service if changes are planned to steroids, nutrition, TPN/TF and anticipated procedures requiring prolonged NPO status.         Interval History/Review of Systems :   The last 24 hours progress and nursing notes reviewed.  - No acute events overnight.     Planned Procedures/Surgeries: none    Inpatient Glucose Control:       Recent Labs   Lab 06/06/25  0801 06/05/25  2213 06/05/25  1819 06/05/25  1636 06/05/25  1326 06/05/25  1225   * 290* 197* 128* 126* 117*             Medications Impacting Glycemia:   Steroids: Dexamethasone 8mg 6/5 at 1139  D5W containing solutions/medications: none  Other medications impacting glucose: none        Nutrition:   Orders Placed This Encounter      Regular Diet  "Adult    Supplements:  none  TF: none  TPN: none         Diabetes History: see full consult note for complete diabetes history   Diabetes Type and Duration: T1DM since 2010  GAD65 antibody, zinc transporter 8 antibody, islet antibody, insulin antibody, and c-peptide not available on epic search      PTA Medication Regimen:   Pregnancy doses:  - Tresiba 29 units at 2000  - Novolog via InPen (uses to log insulin and half-unit dosing capability)                ICR: 1:3                ISF: 1:25                AIT: 4 hours  Missing doses?: denies    Prior to pregnancy:  She takes 21 units of Basaglar at night and 1 per 6 carb ratio.    Historical Diabetes Medications: insulins     Glucose monitoring device/frequency/trends: Heriberto 3 sensor  Hypoglycemia PTA:   - Awareness:  intact       Outpatient Diabetes Provider: [facility]: Dr. Butler (LOV 6/3/25), Marlyn Barrett (LOV 4/22/25)  Formal Diabetes Education/Educator: yes        Physical Exam:   /77   Pulse 70   Temp 98  F (36.7  C)   Resp 16   Ht 1.646 m (5' 4.8\")   Wt 91.2 kg (201 lb)   LMP 09/16/2024   SpO2 100%   Breastfeeding Unknown   BMI 33.65 kg/m    General:  well appearing, in no acute distress.  HEENT:  NC/AT. MMM, sclera not injected  Lungs:  unremarkable, no new cough, no SOB  MSK:   moving all extremities  Mental Status:  Alert and oriented x3  Psych:   Cooperative, good eye contact, full/appropriate affect           Data:     Lab Results   Component Value Date    A1C 7.5 (H) 05/16/2025    A1C 6.7 (H) 02/25/2025    A1C 6.9 (H) 12/11/2024    A1C 7.6 (H) 03/18/2024    A1C 8.1 (H) 10/11/2023    A1C 8.0 (H) 12/10/2020    A1C 9.1 (H) 01/24/2020       ROUTINE IP LABS (Last four results)  BMP  Recent Labs   Lab 06/06/25  0801 06/05/25  2213 06/05/25  1819 06/05/25  1636   * 290* 197* 128*     CBC  Recent Labs   Lab 06/06/25  0703 06/02/25  1457   WBC  --  10.0   RBC  --  4.39   HGB 9.1* 11.3*   HCT  --  36.1   MCV 82 82   MCH  --  " 25.7*   MCHC  --  31.3*   RDW  --  15.5*   PLT  --  211     INRNo lab results found in last 7 days.    Inpatient Diabetes Service will continue to follow, please don't hesitate to contact the team with any questions or concerns.     VELASQUEZ Gregory CNP    Plan discussed with patient, bedside RN, and primary team via this note.    To contact Inpatient Diabetes Service:     7 AM - 5 PM: Page the [employer] JAYCE following the patient that day (see filed or incomplete progress notes/consult notes under Endocrinology)    OR if uncertain of provider assignment: page job code [phone]    5 PM - 7 AM: First call after hours is to primary service.    For urgent after-hours questions, page job code for on call fellow: [phone]     I spent a total of 45 minutes on the date of the encounter doing prep/post-work, chart review, history and exam, documentation and further activities per the note including lab review, multidisciplinary communication, counseling the patient and/or coordinating care regarding acute hyper/hypoglycemic management, as well as discharge management and planning/communication.

## 2025-06-06 NOTE — PROGRESS NOTES
"[facility]   Postpartum Note    Name:  Isaiah Jarquin  MRN: [id number]    S: Patient  status post CS on 2025. Pain is well controlled with scheduled tylenol and ibuprofen. Patient did receive a tap block while in the OR and we discussed that pain may increase as that wears off. Tolerating regular diet. Reports no flatulance or bowel movement. Ambulating without dizziness. Spontaneously voiding. Lochia decreasing.  Breast feeding and providing formula supplementation as needed.  Plans POPs for postpartum contraception.     O:   Patient Vitals for the past 24 hrs:   BP Temp Temp src Pulse Resp SpO2 Height Weight   25 0450 110/76 98  F (36.7  C) Oral 64 17 -- -- --   25 0020 115/73 -- -- 70 16 -- -- --   25 1900 109/77 -- -- 73 18 100 % -- --   25 1800 97/66 -- -- -- -- 98 % -- --   25 1700 102/62 -- -- -- 16 97 % -- --   25 1610 -- -- -- -- 16 -- -- --   25 1600 -- -- -- -- -- 98 % -- --   25 1500 -- -- -- -- -- 99 % -- --   25 1430 98/61 97.8  F (36.6  C) Oral -- 16 100 % -- --   25 1400 117/75 -- -- -- -- 100 % -- --   25 1345 118/81 -- -- 85 16 99 % -- --   25 1330 114/66 -- -- 86 16 99 % -- --   25 1315 120/77 -- -- 82 15 100 % -- --   25 1300 120/83 -- -- 77 15 100 % -- --   25 1245 122/84 -- -- 81 12 98 % -- --   25 1230 112/74 97  F (36.1  C) Oral 79 14 100 % -- --   25 0835 113/76 98.3  F (36.8  C) Oral -- 16 -- 1.646 m (5' 4.8\") 91.2 kg (201 lb)     Gen:  Resting comfortably, NAD  CV:  Regular rate, well perfused  Pulm:  Breathing room air comfortably  Abd:  Soft, appropriately ttp, non-distended. Fundus firm and 1cm above umbilicus, firm and non-tender.  Incision: C/d/i, no surrounding redness or erythema with dressing in place, removed today  Ext:  Non-tender, 2+ LE edema b/l    I/O last 3 completed shifts:  In: 1398.33 [P.O.:1300; I.V.:98.33]  Out:  " [Urine:1100; Blood:1042]    Hgb:   Hemoglobin   Date Value Ref Range Status   2025 9.1 (L) 11.7 - 15.7 g/dL Final   2012 14.2 11.7 - 15.7 g/dL Final       Assessment/Plan:  30 year old  on POD#1 s/p PLTCS.    Routine postpartum care:  Pain: Well-controlled with ibuprofen, tylenol  Hgb: 11.3 > > AM pending. Patient is asymptomatic from acute blood loss anemia and vitals are reassuring. Will discharge home with PO iron if Hgb <10  Rh: pos  Imm:  Rubella NI, Hep B NR  Feed: Br  BC: POPs    T1DM  She had elevated BG overnight/into this morning to the high 200s although this is in the setting of receiving steroids in the OR yesterday. Endocrinology and the patient are all aware of this contributing factor but will continue to watch closely.   - 19U HS/1:7CHO, mSSI  - endo following    Medically Ready for Discharge: Anticipated in 2-4 Days      Kulwant Payne MD  Obstetrics & Gynecology, PGY-2  2025 8:00 AM      Physician Attestation   I, Micheline Rehman, personally saw and examined Isaiah.    I appreciate the note by Dr. Payne.   I saw and evaluated the patient separately and agree with the findings and plan of care as documented in the note. I have reviewed her vitals, labs, and medications. I have discussed the plan of care with the resident.   She is doing well. Has no questions regarding delivery.  Ambulating and voiding fine this morning.  Following with the endocrine team for elevated BG levels s/p decadron yesterday.   Hemoglobin   Date Value Ref Range Status   2025 9.1 (L) 11.7 - 15.7 g/dL Final   2025 11.3 (L) 11.7 - 15.7 g/dL Final   2012 14.2 11.7 - 15.7 g/dL Final      Recent Labs   Lab 25  0801 25  2213 25  1819 25  1636 25  1326 25  1225   * 290* 197* 128* 126* 117*     We discussed routine post op/ pp cares and normal expectations for recovery.      Medically Ready for Discharge: Anticipated in 2-4 Days          Micheline Rehman MD  Date of Service (when I saw the patient): June 6, 2025

## 2025-06-07 LAB
GLUCOSE BLDC GLUCOMTR-MCNC: 104 MG/DL (ref 70–99)
GLUCOSE BLDC GLUCOMTR-MCNC: 128 MG/DL (ref 70–99)
GLUCOSE BLDC GLUCOMTR-MCNC: 135 MG/DL (ref 70–99)
GLUCOSE BLDC GLUCOMTR-MCNC: 149 MG/DL (ref 70–99)
GLUCOSE BLDC GLUCOMTR-MCNC: 93 MG/DL (ref 70–99)

## 2025-06-07 PROCEDURE — 250N000013 HC RX MED GY IP 250 OP 250 PS 637

## 2025-06-07 PROCEDURE — 120N000002 HC R&B MED SURG/OB UMMC

## 2025-06-07 RX ADMIN — INSULIN ASPART 4 UNITS: 100 INJECTION, SOLUTION INTRAVENOUS; SUBCUTANEOUS at 19:01

## 2025-06-07 RX ADMIN — IBUPROFEN 800 MG: 800 TABLET ORAL at 17:06

## 2025-06-07 RX ADMIN — ACETAMINOPHEN 975 MG: 325 TABLET ORAL at 04:46

## 2025-06-07 RX ADMIN — ACETAMINOPHEN 975 MG: 325 TABLET ORAL at 17:06

## 2025-06-07 RX ADMIN — ACETAMINOPHEN 975 MG: 325 TABLET ORAL at 23:19

## 2025-06-07 RX ADMIN — IBUPROFEN 800 MG: 800 TABLET ORAL at 23:19

## 2025-06-07 RX ADMIN — ACETAMINOPHEN 975 MG: 325 TABLET ORAL at 10:37

## 2025-06-07 RX ADMIN — IBUPROFEN 800 MG: 800 TABLET ORAL at 10:37

## 2025-06-07 RX ADMIN — SIMETHICONE 80 MG: 80 TABLET, CHEWABLE ORAL at 08:05

## 2025-06-07 RX ADMIN — SENNOSIDES AND DOCUSATE SODIUM 2 TABLET: 50; 8.6 TABLET ORAL at 08:05

## 2025-06-07 RX ADMIN — IBUPROFEN 800 MG: 800 TABLET ORAL at 04:46

## 2025-06-07 ASSESSMENT — ACTIVITIES OF DAILY LIVING (ADL)
ADLS_ACUITY_SCORE: 32
ADLS_ACUITY_SCORE: 32
ADLS_ACUITY_SCORE: 35
ADLS_ACUITY_SCORE: 32
ADLS_ACUITY_SCORE: 32
ADLS_ACUITY_SCORE: 35
ADLS_ACUITY_SCORE: 32
ADLS_ACUITY_SCORE: 35
ADLS_ACUITY_SCORE: 32

## 2025-06-07 NOTE — DISCHARGE INSTRUCTIONS
Diabetes Management Discharge Plan    Blood glucose monitoring: Three times daily before meals, and at bedtime.  Blood Glucose (BG) goals:  mg/dL before meals, less than 180 mg/dL 2 hrs after meals.     Glucose Control Regimen:    1) Long-acting insulin: Tresiba- take 13 units once daily at 8 pm. Take at same time each day.    Per [facility] settings changed 6/6/25 as reflected below:   2) Rapid-acting insulin: aspart (Novolog) carbohydrate coverage/mealtime insulin - take 1 unit per 15 grams of carbohydrates before meals and snacks.    3) Rapid-acting insulin: aspart (Novolog) correction - see chart below. Take for high blood glucoses three times daily before meals and at bedtime.   You may add the correction dose to the carbohydrate coverage/mealtime insulin dose and give in one injection--ideally 10-15 minutes before a meal.  You may take the correction dose even if you skip a meal (as long as it has been 4 hours since previous correction dose).   Pre-meal correction scale:    Blood Glucose Insulin Novolog Before Meals:   Less than 140 0 units   140 -189  1 units   190 - 239 2 units   240 - 289 3 units   290 - 339 4 units    340 - 389  5 units   390 - 439 6 units   440 or more 7 units     Bedtime correction scale:     Blood Glucose Insulin Novolog At Bedtime:   Less than 200 0 units   200-249  1 units   250 - 299 2 units   300 - 349 3 units   350 - 399 4 units    400 or more 5 units      Outpatient Diabetes Follow-Up: Follow up with your Endocrinologist or your diabetes educator in 1-2 weeks from discharge, bring glucose data to your appointment. . Follow up sooner if blood glucose runs consistently greater than 200 mg/dL or if having more than two episodes less than 70 mg/dL.     If you have urgent questions or concerns regarding your blood sugars or insulin, you may contact [phone] (the main hospital ). Ask to speak with the Endocrinologist on call.       Thank you for letting the Diabetes  Management Team be involved in your care!      Hypoglycemia (Low Blood Glucose) Management:  Signs/symptoms:  Shaking, sweating, fast heartbeat  Feeling dizzy, tired, or weak   Feeling anxious and easy to irritate  Feeling nervous, crabby, or confused  Hunger  Vision problems, headache  Numb or tingling mouth    If blood glucose is 51 to 70:   Eat or drink 15 grams of carbohydrate. Examples:   1/2 cup (4 ounces) apple juice or regular soda pop, or   1 cup (8 ounces) milk, or   15 skittles, or   3 to 4 glucose tablets.   Re-check your blood glucose in 15 minutes.   Repeat these steps every 15 minutes until your blood glucose is above 80.       If blood glucose is 50 or less:   Eat or drink 30 grams of carbohydrate. Examples:   1 cup (8 ounces) apple juice or regular soda pop, or   2 cups (16 ounces) milk, or   1 banana, or   6 to 8 glucose tablets.   Re-check your blood glucose in 15 minutes.   Repeat these steps every 15 minutes until your blood glucose is above 80.            Section: What to Expect at Home  Your Recovery     A  section, or , is surgery to deliver your baby through a cut that the doctor makes in your lower belly and uterus. The cut is called an incision.  You may have some pain in your lower belly and need pain medicine for 1 to 2 weeks. You can expect some vaginal bleeding for several weeks. You will probably need about 6 weeks to fully recover.  It's important to take it easy while the incision heals. Avoid heavy lifting, strenuous activities, and exercises that strain the belly muscles while you recover. Ask a family member or friend for help with housework, cooking, and shopping.  This care sheet gives you a general idea about how long it will take for you to recover. But each person recovers at a different pace. Follow the steps below to get better as quickly as possible.  How can you care for yourself at home?  Activity       Rest when you feel tired. Getting enough  sleep will help you recover.        Try to walk each day. Start by walking a little more than you did the day before. Bit by bit, increase the amount you walk. Walking boosts blood flow and helps prevent pneumonia, constipation, and blood clots.        Avoid strenuous activities, such as bicycle riding, jogging, weightlifting, and aerobic exercise, for 6 weeks or until your doctor says it is okay.        Until your doctor says it is okay, do not lift anything heavier than your baby.        Do not do sit-ups or other exercises that strain the belly muscles for 6 weeks or until your doctor says it is okay.        Hold a pillow over your incision when you cough or take deep breaths. This will support your belly and decrease your pain.        You may shower as usual. Pat the incision dry when you are done.        You will have some vaginal bleeding. Wear sanitary pads. Do not douche or use tampons until your doctor says it is okay.        Ask your doctor when you can drive again.        You will probably need to take at least 6 weeks off work. It depends on the type of work you do and how you feel.        Ask your doctor when it is okay for you to have sex.   Diet       You can eat your normal diet. If your stomach is upset, try bland, low-fat foods like plain rice, broiled chicken, toast, and yogurt.        Drink plenty of fluids (unless your doctor tells you not to).        You may notice that your bowel movements are not regular right after your surgery. This is common. Try to avoid constipation and straining with bowel movements. You may want to take a fiber supplement every day. If you have not had a bowel movement after a couple of days, ask your doctor about taking a mild laxative.        If you are breastfeeding, limit alcohol. Alcohol can cause a lack of energy and other health problems for the baby when a breastfeeding woman drinks heavily. It can also get in the way of a mom's ability to feed her baby or to  care for the child in other ways. There isn't a lot of research about exactly how much alcohol can harm a baby. Having no alcohol is the safest choice for your baby. If you choose to have a drink now and then, have only one drink, and limit the number of occasions that you have a drink. Wait to breastfeed at least 2 hours after you have a drink to reduce the amount of alcohol the baby may get in the milk.   Medicines       Your doctor will tell you if and when you can restart your medicines. You will also get instructions about taking any new medicines.        If you stopped taking aspirin or some other blood thinner, your doctor will tell you when to start taking it again.        Take pain medicines exactly as directed.  If the doctor gave you a prescription medicine for pain, take it as prescribed.  If you are not taking a prescription pain medicine, ask your doctor if you can take an over-the-counter medicine.        If you think your pain medicine is making you sick to your stomach:  Take your medicine after meals (unless your doctor has told you not to).  Ask your doctor for a different pain medicine.        If your doctor prescribed antibiotics, take them as directed. Do not stop taking them just because you feel better. You need to take the full course of antibiotics.   Incision care       If you have strips of tape on the incision, leave the tape on for a week or until it falls off.        Wash the area daily with warm, soapy water, and pat it dry. Don't use hydrogen peroxide or alcohol, which can slow healing. You may cover the area with a gauze bandage if it weeps or rubs against clothing. Change the bandage every day.        Keep the area clean and dry.   Other instructions       If you breastfeed your baby, you may be more comfortable while you are healing if you don't rest your baby on your belly. Try tucking your baby under your arm, with your baby's body along the side you will be feeding on. Support  your baby's upper body with your arm. With that hand you can control your baby's head to bring your baby's mouth to your breast. This is sometimes called the football hold.   Follow-up care is a key part of your treatment and safety. Be sure to make and go to all appointments, and call your doctor if you are having problems. It's also a good idea to know your test results and keep a list of the medicines you take.  When should you call for help?  Share this information with your partner, family, or a friend. They can help you watch for warning signs.  Call 911  anytime you think you may need emergency care. For example, call if:       You feel you cannot stop from hurting yourself, your baby, or someone else.        You passed out (lost consciousness).        You have chest pain, are short of breath, or cough up blood.        You have a seizure.   Where to get help 24 hours a day, 7 days a week   If you or someone you know talks about suicide, self-harm, a mental health crisis, a substance use crisis, or any other kind of emotional distress, get help right away. You can:       Call the Suicide and Crisis Lifeline at 988.        Call 0-506-083-TALK (1-441.827.2060).        Text HOME to 883984 to access the Crisis Text Line.   Consider saving these numbers in your phone.  Go to [employer] for more information or to chat online.  Call your doctor or midwife now or seek immediate medical care if:       You have loose stitches, or your incision comes open.        You have signs of hemorrhage (too much bleeding), such as:  Heavy vaginal bleeding. This means that you are soaking through one or more pads in an hour. Or you pass blood clots bigger than an egg.  Feeling dizzy or lightheaded, or you feel like you may faint.  Feeling so tired or weak that you cannot do your usual activities.  A fast or irregular heartbeat.  New or worse belly pain.        You have symptoms of infection, such as:  Increased pain, swelling,  "warmth, or redness.  Red streaks leading from the incision.  Pus draining from the incision.  A fever.  Frequent or painful urination or blood in your urine.  Vaginal discharge that smells bad.  New or worse belly pain.        You have symptoms of a blood clot in your leg (called a deep vein thrombosis), such as:  Pain in the calf, back of the knee, thigh, or groin.  Swelling in the leg or groin.  A color change on the leg or groin. The skin may be reddish or purplish, depending on your usual skin color.        You have signs of preeclampsia, such as:  Sudden swelling of your face, hands, or feet.  New vision problems (such as dimness, blurring, or seeing spots).  A severe headache.        You have signs of heart failure, such as:  New or increased shortness of breath.  New or worse swelling in your legs, ankles, or feet.  Sudden weight gain, such as more than 2 to 3 pounds in a day or 5 pounds in a week.  Feeling so tired or weak that you cannot do your usual activities.        You had spinal or epidural pain relief and have:  New or worse back pain.  Increased pain, swelling, warmth, or redness at the injection site.  Tingling, weakness, or numbness in your legs or groin.   Watch closely for changes in your health, and be sure to contact your doctor or midwife if:       Your vaginal bleeding isn't decreasing.        You feel sad, anxious, or hopeless for more than a few days.        You are having problems with your breasts or breastfeeding.   Where can you learn more?  Go to https://www.[employer].net/patiented  Enter M806 in the search box to learn more about \" Section: What to Expect at Home.\"  Current as of: 2024  Content Version: 14.4    8256-6087 "[employer].   Care instructions adapted under license by your healthcare professional. If you have questions about a medical condition or this instruction, always ask your healthcare professional. "[employer] disclaims any " warranty or liability for your use of this information.

## 2025-06-07 NOTE — PLAN OF CARE
Goal Outcome Evaluation:      Plan of Care Reviewed With: patient    Problem: Diabetes  Goal: Optimal Functional Ability  Outcome: Progressing     Problem: Postpartum ( Delivery)  Goal: Optimal Pain Control and Function  Outcome: Progressing     Overall Patient Progress: improvingOverall Patient Progress: improving    Outcome Evaluation: Patient's pain is well managed with current pain regime. Voiding with no issues and has had a bowel movement. Working on breastfeeding and lactation had seen her again today and supported. Will continue with plan of care.

## 2025-06-07 NOTE — PROGRESS NOTES
[facility]   Postpartum Note    Name:  Isaiah Jarquin  MRN: [id number]    S: Isaiah is feeling overall pretty well this morning. Pain has been fairly manageable. Tolerating regular diet. Reports flatus, although has not yet had a bowel movement. Ambulating without dizziness. Spontaneously voiding. Lochia decreasing.  Breast feeding, continuing to work on latch, as baby does not seem to want to latch. Plans POPs for postpartum contraception.     O:   Patient Vitals for the past 24 hrs:   BP Temp Temp src Pulse Resp SpO2   25 2052 125/82 97.8  F (36.6  C) Oral 65 16 --   25 1620 119/78 98.3  F (36.8  C) Oral 67 16 --   25 0805 -- -- -- -- -- 100 %   25 0804 122/77 98  F (36.7  C) -- 70 16 --   25 0450 110/76 98  F (36.7  C) Oral 64 17 --     Gen:  Resting comfortably, NAD  CV:  Regular rate, well perfused  Pulm:  Breathing room air comfortably  Abd:  Deferred due to ongoing breastfeeding  Incision: Deferred  Ext:  Non-tender, 2+ LE edema b/l    I/O last 3 completed shifts:  In: 300 [P.O.:300]  Out: 600 [Urine:600]    Hgb:   Hemoglobin   Date Value Ref Range Status   2025 9.1 (L) 11.7 - 15.7 g/dL Final   2012 14.2 11.7 - 15.7 g/dL Final       Assessment/Plan:  30 year old  on POD#2 s/p PLTCS for suspected LGA and maternal request. Doing well in the postoperative period and beginning to meet goals. Plan to work on breastfeeding today; interested in discharge to home if baby starts to latch better. Endocrinology following for postpartum titration of insulin regimen, appreciate recommendations and ongoing cares.     Routine postpartum care:  Pain: Well-controlled with ibuprofen, tylenol  Hgb: 11.3 > > 9.1. Patient is asymptomatic from acute blood loss anemia and vitals are reassuring. Will discharge home with PO iron  Rh: positive  Imm:  Rubella NI, Hep B NR  Feed: Breast, working on this  BC: POPs    T1DM  She had elevated BG  overnight/into morning of POD#1 to the high 200s, thought to be in setting of intraoperative steroids. Better control overnight POD#2. Endocrinology and the patient will continue to watch closely and titrate as necessary  - Per Endo recs 6/6: 15U HS/1:7CHO for meals, 1:15 for snacks, mSSI    Medically Ready for Discharge: Anticipated today vs tomorrow pending breastfeeding    Jacinto Carias MD  OB/GYN PGY-3  06/07/2025 7:19 AM

## 2025-06-07 NOTE — PROGRESS NOTES
Inpatient Diabetes Management Service: Daily Progress Note     HPI: Isaiah Jarquin is a 30 year old  s/p  25 with a PMH significant for Type 1 Diabetes Mellitus. Inpatient Diabetes Service consulted for review of glycemic control and insulin adjustment in the post-partum period.          Assessment/Plan:     Assessment:   Type 1 Diabetes Mellitus without complication. Good control  (A1c 7.5 % 25, GMI 6.0% -25)  Steroid induced hyperglycemia, now rresolved.  S/p C section 25     Plan/Recommendations:    - Decrease Tresiba 13 units q 24 hrs at    - Decrease Novolog Meal Coverage: 1 units per 15 g CHO, TID AC and PRN with snacks/supplements   - Continue Novolog Correction Scale: 1:50>140 TID AC, >200 HS, 0200 (medium resistance)  - BG monitoring: TID AC, HS, 0200  - Hypoglycemia protocol    - Carb counting protocol      Discussion:   at 0200, Received 15 units of Tresiba. Intended for 13 units of Tresiba. Dex should be mostly out of system if not all as it can linger for up to 48 hours.  Will start with 1 per 15 carb ratio for breakfast as she continues to try to breastfeed.  She changed her dexcom settings to be alerted if BG is less than 90. She was alerted last night when BG went down to 89.   Patient ate some nuts at bedside. Reviewed CGM trends overnight. Will decrease Tresiba to 13 units tonight. Changed settings in InPen with patient today as seen in discharge recommendations.     Addendum 1500: BG at 11 am was 93, Reviewed CGM and patient within range after receiving 1 per 15 carb ratio. Will continue 1 per 15 carb ratio.     Discharge planning: Per primary team: Baby needs to stay another night and Isaiah will also stay inpatient to further work on lactation, latch, etc.     TENTATIVE Diabetes Management Discharge Plan  Instructions to patient were posted in AVS and discussed on day of discharge.   Medications and supplies are to be  ordered by primary service on discharge.   *please use the DIAB non-branded discharge supply order set ([id number])*    Patient will need the following supplies prescribed:   No supplies needed   Blood glucose monitoring: Three times daily before meals, and at bedtime.  Blood Glucose (BG) goals:  mg/dL before meals, less than 180 mg/dL 2 hrs after meals.     Glucose Control Regimen:    1) Long-acting insulin: Tresiba- take 13 units once daily at 8 pm. Take at same time each day.       Per Inpen settings changed 6/6/25 as reflected below:   2) Rapid-acting insulin: aspart (Novolog) carbohydrate coverage/mealtime insulin - take 1 unit per 15 grams of carbohydrates before meals and snacks.    3) Rapid-acting insulin: aspart (Novolog) correction - see chart below. Take for high blood glucoses three times daily before meals and at bedtime.   You may add the correction dose to the carbohydrate coverage/mealtime insulin dose and give in one injection--ideally 10-15 minutes before a meal.  You may take the correction dose even if you skip a meal (as long as it has been 4 hours since previous correction dose).   Pre-meal correction scale:    Blood Glucose Insulin Novolog Before Meals:   Less than 140 0 units   140 -189  1 units   190 - 239 2 units   240 - 289 3 units   290 - 339 4 units    340 - 389  5 units   390 - 439 6 units   440 or more 7 units     Bedtime correction scale:     Blood Glucose Insulin Novolog At Bedtime:   Less than 200 0 units   200-249  1 units   250 - 299 2 units   300 - 349 3 units   350 - 399 4 units    400 or more 5 units      Outpatient Diabetes Follow-Up: Follow up with your Endocrinologist or your diabetes educator in 1-2 weeks, bring glucose data to your appointment. . Follow up sooner if blood glucose runs consistently greater than 200 mg/dL or if having more than two episodes less than 70 mg/dL.     If you have urgent questions or concerns regarding your blood sugars or insulin, you may  contact [phone] (the main hospital ). Ask to speak with the Endocrinologist on call.       Thank you for letting the Diabetes Management Team be involved in your care!      Hypoglycemia (Low Blood Glucose) Management:  Signs/symptoms:  Shaking, sweating, fast heartbeat  Feeling dizzy, tired, or weak   Feeling anxious and easy to irritate  Feeling nervous, crabby, or confused  Hunger  Vision problems, headache  Numb or tingling mouth    If blood glucose is 51 to 70:   Eat or drink 15 grams of carbohydrate. Examples:   1/2 cup (4 ounces) apple juice or regular soda pop, or   1 cup (8 ounces) milk, or   15 skittles, or   3 to 4 glucose tablets.   Re-check your blood glucose in 15 minutes.   Repeat these steps every 15 minutes until your blood glucose is above 80.       If blood glucose is 50 or less:   Eat or drink 30 grams of carbohydrate. Examples:   1 cup (8 ounces) apple juice or regular soda pop, or   2 cups (16 ounces) milk, or   1 banana, or   6 to 8 glucose tablets.   Re-check your blood glucose in 15 minutes.   Repeat these steps every 15 minutes until your blood glucose is above 80.         Discharge Planning: (tentative)    Medications: No new supplies needed. Adjustment to dosing    Test Claims:  none needed.   Education: None needed   Outpatient Follow-up:  recommend [facility] Endocrinology as scheduled 9/23/25 with Kimberly Barrett (sooner if having issues with blood sugars).        Thank you for this consult. IDS will continue to follow peripherally and see pt if dose changes are needed.      Please notify Inpatient Diabetes Service if changes are planned to steroids, nutrition, TPN/TF and anticipated procedures requiring prolonged NPO status.         Interval History/Review of Systems :   The last 24 hours progress and nursing notes reviewed.  Pain overnight.   No nausea or vomiting.     Planned Procedures/Surgeries: none    Inpatient Glucose Control:       Recent Labs   Lab 06/07/25  3034  "06/07/25  0236 06/06/25  2116 06/06/25  1829 06/06/25  1329 06/06/25  0801   * 104* 146* 95 141* 158*             Medications Impacting Glycemia:   Steroids: Dexamethasone 8mg 6/5 at 1139  D5W containing solutions/medications: none  Other medications impacting glucose: none        Nutrition:   Orders Placed This Encounter      Regular Diet Adult    Supplements:  none  TF: none  TPN: none         Diabetes History: see full consult note for complete diabetes history   Diabetes Type and Duration: T1DM since 2010  GAD65 antibody, zinc transporter 8 antibody, islet antibody, insulin antibody, and c-peptide not available on epic search      PTA Medication Regimen:   Pregnancy doses:  - Tresiba 29 units at 2000  - Novolog via InPen (uses to log insulin and half-unit dosing capability)                ICR: 1:3                ISF: 1:25                AIT: 4 hours  Missing doses?: denies     Prior to pregnancy:  She takes 21 units of Basaglar at night and 1 per 6 carb ratio.     Historical Diabetes Medications: insulins     Glucose monitoring device/frequency/trends: Heriberto 3 sensor  Hypoglycemia PTA:   - Awareness:  intact       Outpatient Diabetes Provider: [facility]: Dr. Butler (LOV 6/3/25), Marlyn Barrett (LOV 4/22/25)  Formal Diabetes Education/Educator: yes        Physical Exam:   /81 (BP Location: Right arm, Patient Position: Semi-Magaña's, Cuff Size: Adult Regular)   Pulse 75   Temp 97.8  F (36.6  C) (Oral)   Resp 16   Ht 1.646 m (5' 4.8\")   Wt 91.2 kg (201 lb)   LMP 09/16/2024   SpO2 100%   Breastfeeding Unknown   BMI 33.65 kg/m    General:  well appearing, in no acute distress.  HEENT:  NC/AT. MMM, sclera not injected  Lungs:  unremarkable, no new cough, no SOB  ABD:  rounded, soft, non-tender  Skin:  warm and dry, no obvious lesions  Mental Status:  Alert and oriented x3  Psych:   Cooperative, good eye contact, full/appropriate affect           Data:     Lab Results   Component " Value Date    A1C 7.5 (H) 05/16/2025    A1C 6.7 (H) 02/25/2025    A1C 6.9 (H) 12/11/2024    A1C 7.6 (H) 03/18/2024    A1C 8.1 (H) 10/11/2023    A1C 8.0 (H) 12/10/2020    A1C 9.1 (H) 01/24/2020       ROUTINE IP LABS (Last four results)  BMP  Recent Labs   Lab 06/07/25  0759 06/07/25  0236 06/06/25  2116 06/06/25  1829   * 104* 146* 95     CBC  Recent Labs   Lab 06/06/25  0703 06/02/25  1457   WBC  --  10.0   RBC  --  4.39   HGB 9.1* 11.3*   HCT  --  36.1   MCV 82 82   MCH  --  25.7*   MCHC  --  31.3*   RDW  --  15.5*   PLT  --  211     INRNo lab results found in last 7 days.    Inpatient Diabetes Service will continue to follow, please don't hesitate to contact the team with any questions or concerns.     VELASQUEZ Gregory CNP    Plan discussed with patient, bedside RN, and primary team via this note.    To contact Inpatient Diabetes Service:     7 AM - 5 PM: Page the [employer] [facility]AYCE following the patient that day (see filed or incomplete progress notes/consult notes under Endocrinology)    OR if uncertain of provider assignment: page job code [phone]    5 PM - 7 AM: First call after hours is to primary service.    For urgent after-hours questions, page job code for on call fellow: [phone]     I spent a total of 45 minutes on the date of the encounter doing prep/post-work, chart review, history and exam, documentation and further activities per the note including lab review, multidisciplinary communication, counseling the patient and/or coordinating care regarding acute hyper/hypoglycemic management, as well as discharge management and planning/communication.

## 2025-06-07 NOTE — LACTATION NOTE
"This note was copied from a baby's chart.  Follow Up Consult    Infant Name: Agustin    Infant's Primary Care Clinic: [facility]    Maternal Assessment: Breasts soft though filling more on right than left, nipples intact and everted bilaterally.    Infant Assessment:  Agustin has age appropriate output, weight loss just under 10% today.      Weight Change Since Birth: -6.4% at 24 hours, -9.6% at 48 hours old      Feeding History: first 36-40+ hours baby sleepy at breast and reluctant to latch. This morning has had a couple feeds where he was more awake and did some active sucking at the breast with nipple shield.       Feeding Assessment: Infant sleeping on arrival, hesitant to open mouth or suck on finger with assessment but woke some skin to skin and latched great without shield, few sucking bursts with stimulation responded to arm movements and breast compressions with return to sucking a couple times but then slept while latched. We tried nipple shield to entice more acitve feeding, but too sleepy & refused to suck with shield in place. Gave baby supplement via paced bottle feed and mom pumped. Plan to return later today will Funium and cup feed options.       Support given to dad for paced  bottle feeding, baby tolerated well sometimes and other times was disorganized, needed full jaw and some cheek support. Noted his tongue up at roof of mouth when giving drops of colostrum after mom pumped. Checked this with bottle, found when he wasn't organized with sucking he had tongue to roof of mouth nipple was underneath it. When corrected, he immediately had deeper jaw pulls with organized and rhythmic suck, had to pace him (tip nipple down for few \"dry\" sucks) several times over a few minutes until he eventually slowed sucking and paced himself.     Returned later for feed to Funium. Baby sleepy, unable to get him to suck direct on nipple so used 20 mm shield. He would latch but not much sucking despite " stimulation. Placed SNS tubing and show parents how to use it. Kept it locked (pinched in plastic pisano) until elicit sucking finally by eliciting suck on finger then transfer to shield and moving nipple shield in his mouth. When he started sucking both on bottle and at breast with SNS available (though held low level with and even below his head), he sucked quickly several times in a row with stridor heard between some of the sucks. With bottle, this corrected when pace him tipping milk down for several sucks. With SNS we first brought bottle down lower then pinched off tubing to slow the flow (took several seconds after SNS tubing removed before he slowed sucking, likely due to milk still present in reservoir of nipple shield). Update given via phone to provider re: his emesis (before) then spit up (during) this feeding session and the stridor intermittently audible when he sucks too fast without stopping. Updated earlier also (via text) about the tongue frequently at roof of mouth possibly contributing to disorganized feeding as well.     Education:   [] Expected  feeding patterns in the first few days (pg. 38 of Your Guide to To Postpartum and  Care)/ the Second Night  [x] Stages of milk production  [x] Benefits of hands on pumping/hand expression  [x] Early feeding cues     [x] Benefits of feeding on cue  [x] Benefits of skin to skin  [x] Breastfeeding positions  [x] Tips to get and maintain a deep latch and checking to see that tongue is below nipple bot at breast and bottle latches  [x] Nutritive vs.non-nutritive sucking both breast and bottle  [x] Gentle breast compressions as needed to enhance milk transfer  [x] How to tell when baby is finished  [x] How to tell if baby is getting enough  [x] Expected  output  [x] [place] weight loss  [] Infant Feeding Log  [] [facility] Breastfeeding/Pumping videos  [x] Signs breastfeeding is going well (comfortable latch, audible swallows, age  appropriate output and weight loss)    [x] Tips to prevent engorgement  [x] Signs of engorgement  [] Tips to manage engorgement  [x] Pumping recommendations (each time baby gets supplement, reviewed cleaning and sanitizing recommendations and discontinue using colostrum collectors after first 24 hours of use)  [x] [facility] breast [facility] part/infant feeding supplies cleaning recommendations  [x] Inpatient breastfeeding support  [] Outpatient lactation resources    Handouts: none today    Home Breast Pump:     Feeding Plan: Breastfeed on cue at least every 3 hours (LGA, early term, near 10% weight loss) and supplement after per provider orders. With both breast and bottle, assure tongue near floor of mouth before latching may need sucking on finger first to help him bring it down. Recommend supplements (baby upright or side lying) by paced bottle feeding and taught dad how to pace, having him move bottle down for dry sucks after baby takes up to 3-4 sucks without stopping, or sooner if any signs of too much milk flow. Discouraged use of SNS for now. Continue to monitor for stridor, notify provider if worsening or any difficulty tolerating feeds.        Robyn Laureano, RN, IBCLC   Lactation Consultant  [place]: [facility] [phone]  Office: [phone]

## 2025-06-07 NOTE — PLAN OF CARE
VSS and postpartum assessments WDL.  Incision CDI, intact with steri-strips. Up ad raymundo with steady gait and independent with cares. Patient passing gas.  Bonding well with infant.  Breastfeeding on cue every 2-3 hours.  Pain managed with Tylenol and ibuprofen.   present and supportive.  Will continue with postpartum cares and education per plan of care.    Goal Outcome Evaluation:      Plan of Care Reviewed With: patient, spouse    Overall Patient Progress: improvingOverall Patient Progress: improving           Problem: Adult Inpatient Plan of Care  Goal: Optimal Comfort and Wellbeing  Outcome: Progressing  Intervention: Monitor Pain and Promote Comfort  Recent Flowsheet Documentation  Taken 2025 by Wilmer Beal RN  Pain Management Interventions: declines  Intervention: Provide Person-Centered Care  Recent Flowsheet Documentation  Taken 2025 by Wilmer Beal RN  Trust Relationship/Rapport:   care explained   choices provided   questions encouraged   questions answered   thoughts/feelings acknowledged     Problem: Adult Inpatient Plan of Care  Goal: Readiness for Transition of Care  Outcome: Progressing     Problem: Postpartum ( Delivery)  Goal: Effective Bowel Elimination  Outcome: Progressing  Intervention: Enhance Bowel Motility and Elimination  Recent Flowsheet Documentation  Taken 2025 by Wilmer Beal RN  Bowel Elimination Promotion: ambulation promoted     Problem: Postpartum ( Delivery)  Goal: Fluid and Electrolyte Balance  Outcome: Progressing

## 2025-06-08 VITALS
OXYGEN SATURATION: 100 % | SYSTOLIC BLOOD PRESSURE: 125 MMHG | RESPIRATION RATE: 16 BRPM | HEART RATE: 74 BPM | DIASTOLIC BLOOD PRESSURE: 79 MMHG | HEIGHT: 65 IN | BODY MASS INDEX: 32.44 KG/M2 | WEIGHT: 194.7 LBS | TEMPERATURE: 97.9 F

## 2025-06-08 LAB
GLUCOSE BLDC GLUCOMTR-MCNC: 118 MG/DL (ref 70–99)
GLUCOSE BLDC GLUCOMTR-MCNC: 144 MG/DL (ref 70–99)
HBV SURFACE AB SERPL IA-ACNC: <3.5 M[IU]/ML
HBV SURFACE AB SERPL IA-ACNC: NONREACTIVE M[IU]/ML

## 2025-06-08 PROCEDURE — 86706 HEP B SURFACE ANTIBODY: CPT | Performed by: OBSTETRICS & GYNECOLOGY

## 2025-06-08 PROCEDURE — 36415 COLL VENOUS BLD VENIPUNCTURE: CPT | Performed by: OBSTETRICS & GYNECOLOGY

## 2025-06-08 PROCEDURE — 90471 IMMUNIZATION ADMIN: CPT

## 2025-06-08 PROCEDURE — 250N000011 HC RX IP 250 OP 636

## 2025-06-08 PROCEDURE — 250N000013 HC RX MED GY IP 250 OP 250 PS 637

## 2025-06-08 PROCEDURE — 90707 MMR VACCINE SC: CPT

## 2025-06-08 PROCEDURE — 99232 SBSQ HOSP IP/OBS MODERATE 35: CPT | Mod: 24 | Performed by: NURSE PRACTITIONER

## 2025-06-08 RX ORDER — FERROUS SULFATE 325(65) MG
325 TABLET ORAL
Qty: 90 TABLET | Refills: 4 | Status: SHIPPED | OUTPATIENT
Start: 2025-06-08 | End: 2025-06-08

## 2025-06-08 RX ORDER — SENNA AND DOCUSATE SODIUM 50; 8.6 MG/1; MG/1
1 TABLET, FILM COATED ORAL AT BEDTIME
Qty: 60 TABLET | Refills: 0 | Status: SHIPPED | OUTPATIENT
Start: 2025-06-08

## 2025-06-08 RX ORDER — ACETAMINOPHEN 325 MG/1
975 TABLET ORAL EVERY 6 HOURS
Qty: 1080 TABLET | Refills: 0 | Status: SHIPPED | OUTPATIENT
Start: 2025-06-08 | End: 2025-06-08

## 2025-06-08 RX ORDER — IBUPROFEN 800 MG/1
800 TABLET, FILM COATED ORAL EVERY 6 HOURS
Qty: 240 TABLET | Refills: 0 | Status: SHIPPED | OUTPATIENT
Start: 2025-06-08

## 2025-06-08 RX ORDER — INSULIN DEGLUDEC 100 U/ML
30 INJECTION, SOLUTION SUBCUTANEOUS DAILY
Qty: 15 ML | Refills: 3 | Status: SHIPPED | OUTPATIENT
Start: 2025-06-08

## 2025-06-08 RX ORDER — FERROUS SULFATE 325(65) MG
325 TABLET ORAL
Qty: 90 TABLET | Refills: 4 | Status: SHIPPED | OUTPATIENT
Start: 2025-06-08

## 2025-06-08 RX ORDER — ACETAMINOPHEN 325 MG/1
975 TABLET ORAL EVERY 6 HOURS
Qty: 1080 TABLET | Refills: 0 | Status: SHIPPED | OUTPATIENT
Start: 2025-06-08

## 2025-06-08 RX ADMIN — ACETAMINOPHEN 975 MG: 325 TABLET ORAL at 11:59

## 2025-06-08 RX ADMIN — IBUPROFEN 800 MG: 800 TABLET ORAL at 05:58

## 2025-06-08 RX ADMIN — SENNOSIDES AND DOCUSATE SODIUM 2 TABLET: 50; 8.6 TABLET ORAL at 08:23

## 2025-06-08 RX ADMIN — IBUPROFEN 800 MG: 800 TABLET ORAL at 11:59

## 2025-06-08 RX ADMIN — MEASLES, MUMPS, AND RUBELLA VIRUS VACCINE LIVE 0.5 ML: 1000; 12500; 1000 INJECTION, POWDER, LYOPHILIZED, FOR SUSPENSION SUBCUTANEOUS at 12:31

## 2025-06-08 RX ADMIN — ACETAMINOPHEN 975 MG: 325 TABLET ORAL at 05:57

## 2025-06-08 ASSESSMENT — ACTIVITIES OF DAILY LIVING (ADL)
ADLS_ACUITY_SCORE: 32

## 2025-06-08 NOTE — LACTATION NOTE
This note was copied from a baby's chart.  Follow Up Consult    Infant Name: Agustin    Infant's Primary Care Clinic: [facility]    Maternal Assessment: No exam done, mom dressed ready for discharge. She denies significant discomfort with primary engorgement says she's been able to keep comfortable with regular feeds and pumping now about 40 mL each time.       Infant Assessment:  Agustin has age appropriate output, weight loss now 11.3% continues to lose though slowing each day (only 1.7% drop in last 24 hours).     Weight Change Since Birth: -6.4% on day one, -9.6% on day two and -11.3% on day three.      Feeding History: Parents report feedings much improved overnight. He is doing more sucking at breast though still falls asleep part way through then supplement with bottle. Dad shares he is not even having to look anymore to make sure tongue down with bottle feeds, Agustin is latching well to both breast and bottle and they are not hearing the stridor any more while feeding. Dad reports he feels like he has a better way of holding and supporting him that allows him to take bottle better, even the pacing has improved. They report he has rapid sucking when he first starts, but then slows on his own. They'll continue to watch especially with bottle feeds and pace him if/as needed.       Feeding Assessment: Mom had just fed Agustin (still skin to skin and had hiccups after feeding), said he nursed even longer this last time and continues to improve. She could sense a small change in fullness of breast after he fed. Encouraged to call if they feed again before going home, but parents report going well and no feeding witnessed today.      Education:   [x] Expected  feeding patterns in the first few days (pg. 38 of Your Guide to To Postpartum and  Care)/ the Second Night  [x] Stages of milk production  [] Benefits of hand expression of colostrum  [] Early feeding cues     [] Benefits of feeding on  "cue  [] Benefits of skin to skin  [] Breastfeeding positions  [x] Tips to get and maintain a deep latch (yesterday reviewed benefit of \"suck training\" encouraged to place clean finger in mouth intermittently when holding him help him get used to something in between his tongue and roof of his mouth)   [x] Nutritive vs.non-nutritive sucking  [x] Gentle breast compressions as needed to enhance milk transfer  [x] How to tell when baby is finished  [x] How to tell if baby is getting enough  [x] Expected  output  [x]  weight loss  [x] Infant Feeding Log  [] [employer] Breastfeeding/Pumping videos  [x] Signs breastfeeding is going well (comfortable latch, audible swallows, age appropriate output and weight loss)    [x] Tips to prevent engorgement  [x] Signs of engorgement  [x] Tips to manage engorgement  [x] Pumping recommendations: Isaiah is pumping about 40 mL, Agustin taking about 15 mL. Discussed pumping to keep up with and just ahead of his needs, though try not to remove much more than he's taking each time to avoid over-stimulating supply. Provider encouraged increasing supplement volumes and reviewed typical feeding amounts in their booklet to expect him to take in the first week and beyond. Goal now to increase his intake to 20-30 mL today, continuing to increase as he tolerates. Discussed how to tell if he's removing more from breast also and review cues of satiety, that he will start to decline as much for supplements as he's transferring more from the breast.   [] [facility] breast pump part/infant feeding supplies cleaning recommendations  [x] Inpatient breastfeeding support  [x] Outpatient lactation resources    Handouts: Infant Feeding Log (Week 1, Your Guide to Postpartum & Carbon Care Book), [facility] Lactation Resources, and Paced Bottle Feeding ([facility])    Home Breast Pump: Has [employer] pump at home. Discussed pros and cons of wearable pumps, though her supply is getting off to great " start encouraged to watch how her breasts feel and gauge how well home pump is emptying breasts today. If needed, reviewed option to use rental pump in the first month while pumping more often (if wearable pump not effective enough) and discussed how to get order from provider sent to a local DME provider in [place] if they wanted to do that.     Plan: Continue feeding with early cues though no longer than 3 hours between, supplement after and feed to satiety continuing to increase volumes as he tolerates and cues for more. Isaiah continue hands on pumping with each supplement though encouraged tapering amount she pumps to stay just ahead of Agustin's needs to avoid overabundant milk supply.       Encouraged follow up with outpatient lactation consultant  within 1 week after discharge (first baby, LGA and early term, difficulty with feedings in first days, weight loss >10%). Family plans to follow up with [facility], will check with them if they have lactation support there or LC that they use or recommend in [facility]. They will use [employer] [facility] [place] LC's PRN if find they do not have one available closer to them. They will have weight check (>10% weight loss) at clinic appt. tomorrow.        Robyn Laureano, RN, IBCLC   Lactation Consultant  [place]: [facility] [phone]  Office: [phone]

## 2025-06-08 NOTE — PLAN OF CARE
Goal Outcome Evaluation: VSS. Postpartum checks WDL. Incision KADIE with steri-strips in place. Up independently, voiding without difficulty. Passing gas, had BM yesterday. Pain managed with tylenol and ibuprofen. Breastfeeding infant independently, pumping to supplement. BGs monitored per orders.     Pt discharged to home with family. Discharge instructions given and all questions answered. Discharge medications given and instructed on use. Pt to follow-up with provider in 2 and 6 weeks.

## 2025-06-08 NOTE — PROGRESS NOTES
Inpatient Diabetes Management Service: Daily Progress Note     HPI: Isaiah Jarquin is a 30 year old  s/p  25 with a PMH significant for Type 1 Diabetes Mellitus. Inpatient Diabetes Service consulted for review of glycemic control and insulin adjustment in the post-partum period.          Assessment/Plan:     Assessment:   Type 1 Diabetes Mellitus without complication. Good control  (A1c 7.5 % 25, GMI 6.0% -25)  Steroid induced hyperglycemia, now rresolved.  S/p C section 25     Plan/Recommendations:    - Continue Tresiba 13 units q 24 hrs at 2000   - Continue Novolog Meal Coverage: 1 units per 15 g CHO, TID AC and PRN with snacks/supplements   - Continue Novolog Correction Scale: 1:50>140 TID AC, >200 HS, 0200 (medium resistance)  - BG monitoring: TID AC, HS, 0200  - Hypoglycemia protocol    - Carb counting protocol      Discussion:   at 0200.Fast . Overall,BGs stable on currentt regimen.. Discharge instructions placed in AVS       Thank you for this consult. IDS will continue to follow peripherally and see pt if dose changes are needed.      Please notify Inpatient Diabetes Service if changes are planned to steroids, nutrition, TPN/TF and anticipated procedures requiring prolonged NPO status.         Interval History/Review of Systems :   The last 24 hours progress and nursing notes reviewed.  No events overnight.     Planned Procedures/Surgeries: none    Inpatient Glucose Control:       Recent Labs   Lab 25  0814 25  0311 25  2044 25  1712 25  1116 25  0759   * 144* 149* 135* 93 128*             Medications Impacting Glycemia:   Steroids: Dexamethasone 8mg 6/5 at 1139  D5W containing solutions/medications: none  Other medications impacting glucose: none        Nutrition:   Orders Placed This Encounter      Regular Diet Adult    Supplements:  none  TF: none  TPN: none         Diabetes History: see  "full consult note for complete diabetes history   Diabetes Type and Duration: T1DM since 2010  GAD65 antibody, zinc transporter 8 antibody, islet antibody, insulin antibody, and c-peptide not available on epic search      PTA Medication Regimen:   Pregnancy doses:  - Tresiba 29 units at 2000  - Novolog via InPen (uses to log insulin and half-unit dosing capability)                ICR: 1:3                ISF: 1:25                AIT: 4 hours  Missing doses?: denies     Prior to pregnancy:  She takes 21 units of Basaglar at night and 1 per 6 carb ratio.     Historical Diabetes Medications: insulins     Glucose monitoring device/frequency/trends: Heriberto 3 sensor  Hypoglycemia PTA:   - Awareness:  intact       Outpatient Diabetes Provider: [facility]: Dr. Butler (LOV 6/3/25), Marlyn Barrett (LOV 4/22/25)  Formal Diabetes Education/Educator: yes        Physical Exam:   /79 (BP Location: Right arm, Patient Position: Semi-Magaña's, Cuff Size: Adult Regular)   Pulse 74   Temp 97.9  F (36.6  C) (Oral)   Resp 16   Ht 1.646 m (5' 4.8\")   Wt 88.3 kg (194 lb 11.2 oz)   LMP 09/16/2024   SpO2 100%   Breastfeeding Unknown   BMI 32.60 kg/m    General:  well appearing, in no acute distress.  HEENT:  NC/AT. MMM, sclera not injected  Lungs:  unremarkable, no new cough, no SOB           Data:     Lab Results   Component Value Date    A1C 7.5 (H) 05/16/2025    A1C 6.7 (H) 02/25/2025    A1C 6.9 (H) 12/11/2024    A1C 7.6 (H) 03/18/2024    A1C 8.1 (H) 10/11/2023    A1C 8.0 (H) 12/10/2020    A1C 9.1 (H) 01/24/2020       ROUTINE IP LABS (Last four results)  BMP  Recent Labs   Lab 06/08/25  0814 06/08/25  0311 06/07/25 2044 06/07/25  1712   * 144* 149* 135*     CBC  Recent Labs   Lab 06/06/25  0703 06/02/25  1457   WBC  --  10.0   RBC  --  4.39   HGB 9.1* 11.3*   HCT  --  36.1   MCV 82 82   MCH  --  25.7*   MCHC  --  31.3*   RDW  --  15.5*   PLT  --  211     INRNo lab results found in last 7 days.    Inpatient " Diabetes Service will continue to follow, please don't hesitate to contact the team with any questions or concerns.     VELASQUEZ Gregory CNP    Plan discussed with patient, bedside RN, and primary team via this note.    To contact Inpatient Diabetes Service:     7 AM - 5 PM: Page the IDS JAYCE following the patient that day (see filed or incomplete progress notes/consult notes under Endocrinology)    OR if uncertain of provider assignment: page job code [phone]    5 PM - 7 AM: First call after hours is to primary service.    For urgent after-hours questions, page job code for on call fellow: [phone]     I spent a total of 45 minutes on the date of the encounter doing prep/post-work, chart review, history and exam, documentation and further activities per the note including lab review, multidisciplinary communication, counseling the patient and/or coordinating care regarding acute hyper/hypoglycemic management, as well as discharge management and planning/communication.

## 2025-06-08 NOTE — PROGRESS NOTES
[facility]   Postpartum Note    Name:  Isaiah Jarquin  MRN: [id number]    S: Isaiah is feeling well. Pain has been fairly manageable. Tolerating regular diet. Reports flatus, although has not yet had a bowel movement. Ambulating without dizziness. Spontaneously voiding. Lochia decreasing.  Breast feeding is better this morning. Plans OCP for postpartum contraception.     O:   Patient Vitals for the past 24 hrs:   BP Temp Temp src Pulse Resp Weight   25 0626 -- -- -- -- -- 88.3 kg (194 lb 11.2 oz)   25 2323 135/88 98.7  F (37.1  C) Oral 70 18 --   25 1522 115/80 99.7  F (37.6  C) Oral 67 16 --   25 1037 121/79 98.3  F (36.8  C) -- 67 16 --     Gen:  Resting comfortably, NAD  CV:  Regular rate, well perfused  Pulm:  Breathing room air comfortably  Abd:  Deferred due to ongoing breastfeeding  Incision: Deferred  Ext:  Non-tender, 2+ LE edema b/l    No intake/output data recorded.    Hgb:   Hemoglobin   Date Value Ref Range Status   2025 9.1 (L) 11.7 - 15.7 g/dL Final   2012 14.2 11.7 - 15.7 g/dL Final       Assessment/Plan:  30 year old  on POD#3 s/p PLTCS for suspected LGA and maternal request. Doing well in the postoperative period and beginning to meet goals. Meeting milestones for discharge.    Routine postpartum care:  Pain: Well-controlled with ibuprofen, tylenol  Hgb: 11.3 > > 9.1. Patient is asymptomatic from acute blood loss anemia and vitals are reassuring. Will discharge home with PO iron  Rh: positive  Imm:  Rubella NI, Hep B NR, recommended MMR and Hep B series  Feed: Breast, working on this  BC: OCP at 6 weeks    T1DM  - Per Endo recs : 15U HS/1:7CHO for meals, 1:15 for snacks, mSSI  - appreciate recommendations from endocrinology    Medically Ready for Discharge: Anticipated today     Kevin Boss DO, MA  N OBGYN- PGY3  7:57 AM 2025       Physician Attestation   I, Micheline Rehman, personally saw and  reji Colon.    I appreciate the note by Dr. Boss.   I saw and evaluated the patient separately and agree with the findings and plan of care as documented in the note. I have reviewed her vitals, labs, and medications. I have discussed the plan of care with the resident.   She is doing well. Feels confident with her bg management and will follow-up with endocrine.   Anxious to go home.  Adequate pain control with ibu and tylenol.  Declined oxycodone.   Patient will get MMR vaccine prior to discharge.   Uncertain whether she has had hep B vaccine.  Her mother says she did.    Will add on hep b antibody titer to previously drawn labs and follow-up at PP visit if she is not Immune to hep B.  Hemoglobin   Date Value Ref Range Status   06/06/2025 9.1 (L) 11.7 - 15.7 g/dL Final   06/02/2025 11.3 (L) 11.7 - 15.7 g/dL Final   07/03/2012 14.2 11.7 - 15.7 g/dL Final      Recent Labs   Lab 06/08/25  0814 06/08/25  0311 06/07/25  2044 06/07/25  1712 06/07/25  1116 06/07/25  0759   * 144* 149* 135* 93 128*     We discussed routine post op/ pp cares and normal expectations for recovery.      Medically Ready for Discharge: Ready Now         Micheline Rehman MD  Date of Service (when I saw the patient): June 8, 2025

## 2025-07-15 ENCOUNTER — PRENATAL OFFICE VISIT (OUTPATIENT)
Dept: OBGYN | Facility: CLINIC | Age: 30
End: 2025-07-15
Payer: COMMERCIAL

## 2025-07-15 ENCOUNTER — TRANSFERRED RECORDS (OUTPATIENT)
Dept: HEALTH INFORMATION MANAGEMENT | Facility: CLINIC | Age: 30
End: 2025-07-15

## 2025-07-15 VITALS
TEMPERATURE: 97 F | DIASTOLIC BLOOD PRESSURE: 81 MMHG | WEIGHT: 167.7 LBS | HEART RATE: 86 BPM | BODY MASS INDEX: 27.94 KG/M2 | SYSTOLIC BLOOD PRESSURE: 118 MMHG | OXYGEN SATURATION: 98 % | HEIGHT: 65 IN

## 2025-07-15 DIAGNOSIS — Z12.4 PAP SMEAR FOR CERVICAL CANCER SCREENING: ICD-10-CM

## 2025-07-15 DIAGNOSIS — Z30.011 ENCOUNTER FOR INITIAL PRESCRIPTION OF CONTRACEPTIVE PILLS: ICD-10-CM

## 2025-07-15 PROBLEM — Z98.891 S/P CESAREAN SECTION: Status: RESOLVED | Noted: 2025-06-05 | Resolved: 2025-07-15

## 2025-07-15 PROBLEM — Z23 NEED FOR DIPHTHERIA-TETANUS-PERTUSSIS (TDAP) VACCINE: Status: RESOLVED | Noted: 2024-10-29 | Resolved: 2025-07-15

## 2025-07-15 LAB — RETINOPATHY: NEGATIVE

## 2025-07-15 PROCEDURE — 3079F DIAST BP 80-89 MM HG: CPT | Performed by: OBSTETRICS & GYNECOLOGY

## 2025-07-15 PROCEDURE — 0503F POSTPARTUM CARE VISIT: CPT | Performed by: OBSTETRICS & GYNECOLOGY

## 2025-07-15 PROCEDURE — 3074F SYST BP LT 130 MM HG: CPT | Performed by: OBSTETRICS & GYNECOLOGY

## 2025-07-15 PROCEDURE — 99207 PR POST PARTUM EXAM: CPT | Performed by: OBSTETRICS & GYNECOLOGY

## 2025-07-15 RX ORDER — ACETAMINOPHEN AND CODEINE PHOSPHATE 120; 12 MG/5ML; MG/5ML
0.35 SOLUTION ORAL DAILY
Qty: 90 TABLET | Refills: 4 | Status: SHIPPED | OUTPATIENT
Start: 2025-07-15

## 2025-07-15 ASSESSMENT — PATIENT HEALTH QUESTIONNAIRE - PHQ9
SUM OF ALL RESPONSES TO PHQ QUESTIONS 1-9: 1
SUM OF ALL RESPONSES TO PHQ QUESTIONS 1-9: 1
10. IF YOU CHECKED OFF ANY PROBLEMS, HOW DIFFICULT HAVE THESE PROBLEMS MADE IT FOR YOU TO DO YOUR WORK, TAKE CARE OF THINGS AT HOME, OR GET ALONG WITH OTHER PEOPLE: NOT DIFFICULT AT ALL

## 2025-07-15 NOTE — PROGRESS NOTES
Isaiah is here for a 6-week postpartum checkup.    She had a c/s for suspected macrosomia in the setting of T1DM of a viable boy, weight 8 pounds 15 oz., with no complications.  Since delivery, she has been breast feeding.  She has no signs of infection, bleeding or other complications.  She is not pregnant.  We discussed contraceptions and she has chosen mini pill / progesterone only pill.  She denies feeling sad, depressed or too overwhelmed.       7/15/2025    10:20 AM   PHQ   PHQ-9 Total Score 1    Q9: Thoughts of better off dead/self-harm past 2 weeks Not at all       Patient-reported     EXAM:    HEENT: grossly normal.  NECK: no lymphadenopathy or thyroidomegaly.  LUNGS: CTA X 2, no rales or crackles.  BACK: No spinal or CVA tenderness.  HEART: RRR without murmurs clicks or gallops.  ABDOMEN: soft, non tender, good bowel sounds, without masses rebound, guarding or tenderness. Incision well healed    PELVIC:    External genitalia: normal without lesion.                            Vagina: normal mucosa and rugae, no discharge.  Cervix: multiparous, well healed, without lesion.  Uterus: non pregnant in size, firm , mobile, no lesions.  Adnexa: non tender, without masses    EXTREMITIES: Warm to touch, good pulses, no ankle edema or calf tenderness.  NEUROLOGIC: grossly normal.    ASSESSMENT:   Normal 6-week postpartum exam after c/s for suspected macrosomia.    PLAN:  Pap smear done and mini pill / progesterone only pill for contraception. Ok to resume normal activities.  RTC yearly or prn.    JONNY MASTERSON MD

## 2025-08-24 ENCOUNTER — HEALTH MAINTENANCE LETTER (OUTPATIENT)
Age: 30
End: 2025-08-24

## (undated) DEVICE — DRSG STERI STRIP 1/4X3" R1541

## (undated) DEVICE — CATH TRAY FOLEY 16FR BARDEX W/DRAIN BAG STATLOCK 300316A

## (undated) DEVICE — SU MONOCRYL 0 CT-1 36" Y346H

## (undated) DEVICE — PACK C-SECTION LF PL15OTA83B

## (undated) DEVICE — GLOVE PROTEXIS BLUE W/NEU-THERA 6.5  2D73EB65

## (undated) DEVICE — STOCKING SLEEVE COMPRESSION CALF LG

## (undated) DEVICE — STRAP KNEE/BODY 31143004

## (undated) DEVICE — SU VICRYL 4-0 PS-2 18" UND J496G

## (undated) DEVICE — SOL NACL 0.9% IRRIG 1000ML BOTTLE 07138-09

## (undated) DEVICE — SU VICRYL 0 CT-1 36" J346H

## (undated) DEVICE — SOL ADH LIQUID BENZOIN SWAB 0.6ML C1544

## (undated) DEVICE — SOL WATER IRRIG 1000ML BOTTLE 07139-09

## (undated) DEVICE — GLOVE ESTEEM POWDER FREE SMT 6.5  2D72PT65

## (undated) DEVICE — ESU PENCIL W/SMOKE EVAC NEPTUNE STRYKER 0703-046-000

## (undated) DEVICE — PREP CHLORAPREP 26ML TINTED ORANGE  260815

## (undated) RX ORDER — FENTANYL CITRATE 50 UG/ML
INJECTION, SOLUTION INTRAMUSCULAR; INTRAVENOUS
Status: DISPENSED
Start: 2025-06-05

## (undated) RX ORDER — KETOROLAC TROMETHAMINE 15 MG/ML
INJECTION, SOLUTION INTRAMUSCULAR; INTRAVENOUS
Status: DISPENSED
Start: 2025-06-05

## (undated) RX ORDER — MORPHINE SULFATE 1 MG/ML
INJECTION, SOLUTION EPIDURAL; INTRATHECAL; INTRAVENOUS
Status: DISPENSED
Start: 2025-06-05